# Patient Record
Sex: FEMALE | Race: WHITE | NOT HISPANIC OR LATINO | Employment: STUDENT | URBAN - METROPOLITAN AREA
[De-identification: names, ages, dates, MRNs, and addresses within clinical notes are randomized per-mention and may not be internally consistent; named-entity substitution may affect disease eponyms.]

---

## 2017-01-09 ENCOUNTER — OFFICE VISIT (OUTPATIENT)
Dept: PEDIATRIC ENDOCRINOLOGY | Facility: CLINIC | Age: 22
End: 2017-01-09
Payer: COMMERCIAL

## 2017-01-09 ENCOUNTER — LAB VISIT (OUTPATIENT)
Dept: LAB | Facility: HOSPITAL | Age: 22
End: 2017-01-09
Attending: NURSE PRACTITIONER
Payer: COMMERCIAL

## 2017-01-09 VITALS
SYSTOLIC BLOOD PRESSURE: 127 MMHG | DIASTOLIC BLOOD PRESSURE: 71 MMHG | WEIGHT: 141.75 LBS | BODY MASS INDEX: 26.09 KG/M2 | HEIGHT: 62 IN | HEART RATE: 95 BPM

## 2017-01-09 DIAGNOSIS — Z46.81 INSULIN PUMP TITRATION: ICD-10-CM

## 2017-01-09 DIAGNOSIS — E10.65 UNCONTROLLED TYPE 1 DIABETES MELLITUS WITH HYPERGLYCEMIA: Primary | ICD-10-CM

## 2017-01-09 DIAGNOSIS — E10.65 UNCONTROLLED TYPE 1 DIABETES MELLITUS WITH HYPERGLYCEMIA: ICD-10-CM

## 2017-01-09 LAB
ESTIMATED AVG GLUCOSE: 206 MG/DL
HBA1C MFR BLD HPLC: 8.8 %

## 2017-01-09 PROCEDURE — 83036 HEMOGLOBIN GLYCOSYLATED A1C: CPT

## 2017-01-09 PROCEDURE — 99999 PR PBB SHADOW E&M-EST. PATIENT-LVL III: CPT | Mod: PBBFAC,,, | Performed by: NURSE PRACTITIONER

## 2017-01-09 PROCEDURE — 2022F DILAT RTA XM EVC RTNOPTHY: CPT | Mod: S$GLB,,, | Performed by: NURSE PRACTITIONER

## 2017-01-09 PROCEDURE — 3045F PR MOST RECENT HEMOGLOBIN A1C LEVEL 7.0-9.0%: CPT | Mod: S$GLB,,, | Performed by: NURSE PRACTITIONER

## 2017-01-09 PROCEDURE — 36415 COLL VENOUS BLD VENIPUNCTURE: CPT | Mod: PO

## 2017-01-09 PROCEDURE — 1159F MED LIST DOCD IN RCRD: CPT | Mod: S$GLB,,, | Performed by: NURSE PRACTITIONER

## 2017-01-09 PROCEDURE — 3060F POS MICROALBUMINURIA REV: CPT | Mod: S$GLB,,, | Performed by: NURSE PRACTITIONER

## 2017-01-09 PROCEDURE — 99214 OFFICE O/P EST MOD 30 MIN: CPT | Mod: S$GLB,,, | Performed by: NURSE PRACTITIONER

## 2017-01-09 NOTE — MR AVS SNAPSHOT
[facility]  [address]  [facility] [address]  Phone: [phone]                  Tammie Gomez   2017 9:00 AM   Appointment    Description:  Female : 1995   Provider:  Jaja Birmingham NP   Department:  [facility]                To Do List           Future Appointments        Provider Department Dept Phone    2017 9:00 AM Jaja Birmingham NP [facility] [phone]      Goals (5 Years of Data)     None      [facility]     [facility] -  Assistance  Registered nurses in the [facility] Center provide clinical advisement, health education, appointment booking, and other advisory services.  Call for this free service at [phone].             Medications           Message regarding Medications     Verify the changes and/or additions to your medication regime listed below are the same as discussed with your clinician today.  If any of these changes or additions are incorrect, please notify your healthcare provider.             Verify that the below list of medications is an accurate representation of the medications you are currently taking.  If none reported, the list may be blank. If incorrect, please contact your healthcare provider. Carry this list with you in case of emergency.           Current Medications     FREESTYLE TEST Strp Use as directed to test blood glucose levels up to 8 times a day    FREESTYLE TEST Strp AS DIRECTED TO TEST BLOOD GLUCOSE LEVELS UP TO 8 TIMES PER DAY    glucagon (human recombinant) inj 1mg/mL kit Inject 1 mL (1 mg total) into the skin once. Kit Injection As directed    insulin aspart (NOVOLOG) 100 unit/mL injection Use as directed to fill pump with 200 units every 2 days    LUTERA, 28, 0.1-20 mg-mcg per tablet TK 1 T PO D UTD    norgestimate-ethinyl estradiol (SPRINTEC, 28,) 0.25-35 mg-mcg per tablet Take 1 tablet by mouth once daily.    NOVOLOG 100  unit/mL injection AS DIRECTED TO FILL PUMP WITH 200 UNITS EVERY 2 DAYS    urine glucose-ketones test (KETO-DIASTIX) Strp Check urine ketones when BG>300           Clinical Reference Information           Allergies as of 1/9/2017     Sulfa (Sulfonamide Antibiotics)      Immunizations Administered on Date of Encounter - 1/9/2017     None

## 2017-01-09 NOTE — PROGRESS NOTES
Tammie Gomez is being seen in the pediatric endocrinology clinic today in follow up for type 1 diabetes. She was accompanied by her father.    Interval History:   Tammie is a 21 y.o. female with type 1 diabetes diagnosed in December 1999.  She is on CSII using Omnipod.  Since the last visit in October 2016, she has been well with no major illnesses. Diabetes history: No episodes of DKA, no episodes of severe hypoglycemia resulting in seizure, no site infections.     Review of blood sugars from pump download, shows: overall average blood glucose of 224 mg/dL. She is checking her blood glucoses levels ~3-4 times a day. Injection/infusion sites: hips, arms.     Hypoglycemia: She is having minimal episodes of hypoglycemia. 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     Has been in the 200s throughout the day. No ketones. Few hypoglycemic episodes    Nutrition: carb counting and giving insulin prior to meals.     Review of growth chart: stable weight    Current insulin regimen:  Basal rates:  Mid         0.9 units/hr                               5 pm     1.00 units/hr    Carb Ratio: 12a-1:8g, 1p-1:7g                   Correction Factor: 1 unit for every 40 over 120/150 5pm-midnight    Total daily dose: ~57 units/day, 39% basal    Review of Systems:  History obtained from father and patient.  General ROS: no recent illness, no fatigue   Endocrine ROS: negative for - change in hair pattern, palpitations, polydypsia/polyuria, skin changes, temperature intolerance or nocturia.  Ophthalmic ROS: No blurry vision or double vision  Respiratory ROS: no cough, shortness of breath, or wheezing  Cardiovascular ROS: no chest pain or dyspnea on exertion  Gastrointestinal ROS: no abdominal pain, diarrhea, or constipation  Musculoskeletal ROS:no muscle or joint pain  Neurological ROS: No headaches  Dermatological ROS: no rashes, no dry skin  Gyn ROS: regular menses  The remainder of the  "review of systems was unremarkable.    Past Medical/Family/Surgical History:  I have reviewed, and verified the past medical, surgical, and family history and updated as appropriate.    Social History:  Denies SA, smoking, drug use. Reports occasional alcohol use  Sophomore in college    Meds:  Current Outpatient Prescriptions   Medication Sig    FREESTYLE TEST Strp Use as directed to test blood glucose levels up to 8 times a day    FREESTYLE TEST Strp AS DIRECTED TO TEST BLOOD GLUCOSE LEVELS UP TO 8 TIMES PER DAY    glucagon (human recombinant) inj 1mg/mL kit Inject 1 mL (1 mg total) into the skin once. Kit Injection As directed    insulin aspart (NOVOLOG) 100 unit/mL injection Use as directed to fill pump with 200 units every 2 days    LUTERA, 28, 0.1-20 mg-mcg per tablet TK 1 T PO D UTD    norgestimate-ethinyl estradiol (SPRINTEC, 28,) 0.25-35 mg-mcg per tablet Take 1 tablet by mouth once daily.    NOVOLOG 100 unit/mL injection AS DIRECTED TO FILL PUMP WITH 200 UNITS EVERY 2 DAYS    urine glucose-ketones test (KETO-DIASTIX) Strp Check urine ketones when BG>300     No current facility-administered medications for this visit.        Physical Exam:  Visit Vitals    /71 (BP Location: Left arm, Patient Position: Sitting, BP Method: Automatic)    Pulse 95    Ht 5' 2.05" (1.576 m)    Wt 64.3 kg (141 lb 12.1 oz)    BMI 25.89 kg/m2      General: well appearing  Skin: normal tone and texture  Eyes: pupils equal and reactive to light, extraocular movements intact  Neck: No lymphadenopathy, no thyromegaly  Lungs: Clear to ausculatation  Heart: regular rate and rhythm, normal S1/S2, no murmurs  Abdomen: soft, non-distended, no masses  Neuro: gross motor exam normal by observation, DTR normal for age  Extremities: full range of motion, normal tone, + evidence of BG monitoring on fingers   Injection Sites: normal    Labs:  Hemoglobin A1C   Date Value Ref Range Status   10/10/2016 8.4 (H) 4.5 - 6.2 % Final     " Comment:     According to ADA guidelines, hemoglobin A1C <7.0% represents  optimal control in non-pregnant diabetic patients.  Different  metrics may apply to specific populations.   Standards of Medical Care in Diabetes - 2016.  For the purpose of screening for the presence of diabetes:  <5.7%     Consistent with the absence of diabetes  5.7-6.4%  Consistent with increasing risk for diabetes   (prediabetes)  >or=6.5%  Consistent with diabetes  Currently no consensus exists for use of hemoglobin A1C  for diagnosis of diabetes for children.     07/18/2016 7.5 (H) 4.5 - 6.2 % Final     Comment:     According to ADA guidelines, hemoglobin A1C <7.0% represents  optimal control in non-pregnant diabetic patients.  Different  metrics may apply to specific populations.   Standards of Medical Care in Diabetes - 2016.  For the purpose of screening for the presence of diabetes:  <5.7%     Consistent with the absence of diabetes  5.7-6.4%  Consistent with increasing risk for diabetes   (prediabetes)  >or=6.5%  Consistent with diabetes  Currently no consensus exists for use of hemoglobin A1C  for diagnosis of diabetes for children.     03/28/2016 8.5 (H) 4.5 - 6.2 % Final       Screening tests:  Component      Latest Ref Rng 7/18/2016   Cholesterol      120 - 199 mg/dL 182   Triglycerides      30 - 150 mg/dL 185 (H)   HDL      40 - 75 mg/dL 50   LDL Cholesterol      63.0 - 159.0 mg/dL 95.0   HDL/Chol Ratio      20.0 - 50.0 % 27.5   Total Cholesterol/HDL Ratio      2.0 - 5.0 3.6   Non-HDL Cholesterol      mg/dL 132   Microalbum.,U,Random      ug/mL 11.0   Creatinine, Random Ur      15.0 - 325.0 mg/dL 293.0   Microalb Creat Ratio      0.0 - 30.0 ug/mg 3.8   TSH      0.400 - 4.000 uIU/mL 1.609     TTG IgA      <20 UNITS 5   IgA       mg/dL 200       Eye Exam: July 2016- no diabetic changes    Assessment/Plan:  Tammie HARKINS is a 21 y.o. female with T1D of ~17y duration on 0.50 units/kg/day. Diabetes under sub-optimal control, and  control worsening. A1c is rising. Goal A1c is <7.5%. Her blood sugars were reviewed for the past four weeks. I reviewed boluses and basal rates and adjusted insulin doses: increased basal rate at midnight to 0.95 units/hr and 5p-1.1units/hr   -will switch to Humalog when dad calls for refills.   -instructed to upload pump or call if has hypoglycemia once insulin is changed.   -Reviewed sick day management and the importance of checking for ketones  -Reviewed pump management if ketones are present  -Continued conversation on transitioning to adult care- Tammie will continue with Ped Endo for now but likely transfer by summer  Screening labs UTD      Follow up in 3 months.    It was a pleasure to see your patient in clinic today. Please call with any questions or concerns.      Jaja Birmingham NP  Pediatric Endocrinology      Over 50% of this 40 minute visit was spent in counseling/coordinating care. I counseled the family on hypoglycemia prevention and treatment, sick day management, causes, recognition and consequences of DKA, sports and diabetes management, impact of drug and alcohol use on blood glucose control, intensive insulin therapy, insulin omission, and goals for therapy.

## 2017-03-16 ENCOUNTER — PATIENT MESSAGE (OUTPATIENT)
Dept: PEDIATRIC ENDOCRINOLOGY | Facility: CLINIC | Age: 22
End: 2017-03-16

## 2017-03-20 RX ORDER — INSULIN LISPRO 100 [IU]/ML
INJECTION, SOLUTION INTRAVENOUS; SUBCUTANEOUS
Qty: 60 ML | Refills: 1 | Status: SHIPPED | OUTPATIENT
Start: 2017-03-20 | End: 2017-05-15 | Stop reason: SDUPTHER

## 2017-05-15 ENCOUNTER — OFFICE VISIT (OUTPATIENT)
Dept: PEDIATRIC ENDOCRINOLOGY | Facility: CLINIC | Age: 22
End: 2017-05-15
Payer: COMMERCIAL

## 2017-05-15 ENCOUNTER — LAB VISIT (OUTPATIENT)
Dept: LAB | Facility: HOSPITAL | Age: 22
End: 2017-05-15
Attending: NURSE PRACTITIONER
Payer: COMMERCIAL

## 2017-05-15 VITALS
WEIGHT: 146.81 LBS | DIASTOLIC BLOOD PRESSURE: 84 MMHG | HEIGHT: 62 IN | BODY MASS INDEX: 27.02 KG/M2 | SYSTOLIC BLOOD PRESSURE: 137 MMHG | HEART RATE: 104 BPM

## 2017-05-15 DIAGNOSIS — Z46.81 INSULIN PUMP TITRATION: ICD-10-CM

## 2017-05-15 DIAGNOSIS — E10.65 UNCONTROLLED TYPE 1 DIABETES MELLITUS WITH HYPERGLYCEMIA: Primary | ICD-10-CM

## 2017-05-15 DIAGNOSIS — E10.65 UNCONTROLLED TYPE 1 DIABETES MELLITUS WITH HYPERGLYCEMIA: ICD-10-CM

## 2017-05-15 PROCEDURE — 99999 PR PBB SHADOW E&M-EST. PATIENT-LVL III: CPT | Mod: PBBFAC,,, | Performed by: NURSE PRACTITIONER

## 2017-05-15 PROCEDURE — 1160F RVW MEDS BY RX/DR IN RCRD: CPT | Mod: S$GLB,,, | Performed by: NURSE PRACTITIONER

## 2017-05-15 PROCEDURE — 83036 HEMOGLOBIN GLYCOSYLATED A1C: CPT

## 2017-05-15 PROCEDURE — 3060F POS MICROALBUMINURIA REV: CPT | Mod: 8P,S$GLB,, | Performed by: NURSE PRACTITIONER

## 2017-05-15 PROCEDURE — 36415 COLL VENOUS BLD VENIPUNCTURE: CPT | Mod: PO

## 2017-05-15 PROCEDURE — 99214 OFFICE O/P EST MOD 30 MIN: CPT | Mod: S$GLB,,, | Performed by: NURSE PRACTITIONER

## 2017-05-15 PROCEDURE — 3045F PR MOST RECENT HEMOGLOBIN A1C LEVEL 7.0-9.0%: CPT | Mod: S$GLB,,, | Performed by: NURSE PRACTITIONER

## 2017-05-15 RX ORDER — INSULIN LISPRO 100 [IU]/ML
INJECTION, SOLUTION INTRAVENOUS; SUBCUTANEOUS
Qty: 60 ML | Refills: 1 | Status: SHIPPED | OUTPATIENT
Start: 2017-05-15 | End: 2017-05-15 | Stop reason: SDUPTHER

## 2017-05-15 RX ORDER — BLOOD SUGAR DIAGNOSTIC
STRIP MISCELLANEOUS
Qty: 250 EACH | Refills: 6 | Status: SHIPPED | OUTPATIENT
Start: 2017-05-15 | End: 2017-11-15 | Stop reason: SDUPTHER

## 2017-05-15 RX ORDER — INSULIN LISPRO 100 [IU]/ML
INJECTION, SOLUTION INTRAVENOUS; SUBCUTANEOUS
Qty: 90 ML | Refills: 1 | Status: SHIPPED | OUTPATIENT
Start: 2017-05-15 | End: 2017-11-15 | Stop reason: SDUPTHER

## 2017-05-15 NOTE — PATIENT INSTRUCTIONS
Current Insulin Regimen    Basal rates:  Mid        1.05 units/hr                               5 pm     1.2 units/hr    Carb Ratio: 12a-1:8g, 1p-1:7g                   Correction Factor: 1 unit for every 40 over 120/150 5pm-midnight        Next Appointment: Follow up in 3 months with adult endocrine      In case of emergency (for example, patient is vomiting or ketones positive), please call [phone] and ask for pediatric endocrinology on call.    For prescription refills, please call during business hours.

## 2017-05-15 NOTE — PROGRESS NOTES
Tammie Gomez is being seen in the pediatric endocrinology clinic today in follow up for type 1 diabetes. She was accompanied by her father.    Interval History:   Tammie is a 22 y.o. female with type 1 diabetes diagnosed in December 1999.  She is on CSII using Omnipod.  Since the last visit in October 2016, she has been well with no major illnesses. Diabetes history: No episodes of DKA, no episodes of severe hypoglycemia resulting in seizure, no site infections. She switched to Humalog a couple of months ago and ahs noticed higher blood sugars and difficulty getting them down.     Review of blood sugars from pump download, shows: overall average blood glucose of 322 mg/dL (). She is checking her blood glucoses levels ~3-4 times a day. Injection/infusion sites: hips, arms.     Hypoglycemia: She is having minimal episodes of hypoglycemia. 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     Has been in the 200s throughout the day. No ketones.     Nutrition: carb counting and giving insulin prior to meals.     Review of growth chart: stable weight    Current insulin regimen:  Basal rates:  Mid         0.95 units/hr                               5 pm     1.1 units/hr    Carb Ratio: 12a-1:8g, 1p-1:7g                   Correction Factor: 1 unit for every 40 over 120/150 5pm-midnight    Total daily dose: ~57 units/day, 39% basal    Review of Systems:  History obtained from father and patient.  General ROS: no recent illness, no fatigue   Endocrine ROS: negative for - change in hair pattern, palpitations, polydypsia/polyuria, skin changes, temperature intolerance or nocturia.  Ophthalmic ROS: No blurry vision or double vision  Respiratory ROS: no cough, shortness of breath, or wheezing  Cardiovascular ROS: no chest pain or dyspnea on exertion  Gastrointestinal ROS: no abdominal pain, diarrhea, or constipation  Musculoskeletal ROS:no muscle or joint pain  Neurological ROS: No  "headaches  Dermatological ROS: no rashes, no dry skin  Gyn ROS: regular menses  The remainder of the review of systems was unremarkable.    Past Medical/Family/Surgical History:  I have reviewed, and verified the past medical, surgical, and family history and updated as appropriate.    Social History:  Denies SA, smoking, drug use. Reports occasional alcohol use  Sophomore in college    Meds:  Current Outpatient Prescriptions   Medication Sig    FREESTYLE TEST Strp Use as directed to test blood glucose levels up to 8 times a day    FREESTYLE TEST Strp AS DIRECTED TO TEST BLOOD GLUCOSE LEVELS UP TO 8 TIMES PER DAY    glucagon (human recombinant) inj 1mg/mL kit Inject 1 mL (1 mg total) into the skin once. Kit Injection As directed    insulin lispro (HUMALOG) 100 unit/mL injection Place 200units in pump every 2-3 days-90day supply    LUTERA, 28, 0.1-20 mg-mcg per tablet TK 1 T PO D UTD    norgestimate-ethinyl estradiol (SPRINTEC, 28,) 0.25-35 mg-mcg per tablet Take 1 tablet by mouth once daily.    urine glucose-ketones test (KETO-DIASTIX) Strp Check urine ketones when BG>300     No current facility-administered medications for this visit.        Physical Exam:  /84 (BP Location: Right arm, Patient Position: Sitting, BP Method: Automatic)  Pulse 104  Ht 5' 2.32" (1.583 m)  Wt 66.6 kg (146 lb 13.2 oz)  BMI 26.58 kg/m2   General: well appearing  Skin: normal tone and texture  Eyes: pupils equal and reactive to light, extraocular movements intact  Neck: No lymphadenopathy, no thyromegaly  Lungs: Clear to ausculatation  Heart: regular rate and rhythm, normal S1/S2, no murmurs  Abdomen: soft, non-distended, no masses  Neuro: gross motor exam normal by observation, DTR normal for age  Extremities: full range of motion, normal tone, + evidence of BG monitoring on fingers   Injection Sites: normal    Labs:  Hemoglobin A1C   Date Value Ref Range Status   01/09/2017 8.8 (H) 4.5 - 6.2 % Final     Comment:     " According to ADA guidelines, hemoglobin A1C <7.0% represents  optimal control in non-pregnant diabetic patients.  Different  metrics may apply to specific populations.   Standards of Medical Care in Diabetes - 2016.  For the purpose of screening for the presence of diabetes:  <5.7%     Consistent with the absence of diabetes  5.7-6.4%  Consistent with increasing risk for diabetes   (prediabetes)  >or=6.5%  Consistent with diabetes  Currently no consensus exists for use of hemoglobin A1C  for diagnosis of diabetes for children.     10/10/2016 8.4 (H) 4.5 - 6.2 % Final     Comment:     According to ADA guidelines, hemoglobin A1C <7.0% represents  optimal control in non-pregnant diabetic patients.  Different  metrics may apply to specific populations.   Standards of Medical Care in Diabetes - 2016.  For the purpose of screening for the presence of diabetes:  <5.7%     Consistent with the absence of diabetes  5.7-6.4%  Consistent with increasing risk for diabetes   (prediabetes)  >or=6.5%  Consistent with diabetes  Currently no consensus exists for use of hemoglobin A1C  for diagnosis of diabetes for children.     07/18/2016 7.5 (H) 4.5 - 6.2 % Final     Comment:     According to ADA guidelines, hemoglobin A1C <7.0% represents  optimal control in non-pregnant diabetic patients.  Different  metrics may apply to specific populations.   Standards of Medical Care in Diabetes - 2016.  For the purpose of screening for the presence of diabetes:  <5.7%     Consistent with the absence of diabetes  5.7-6.4%  Consistent with increasing risk for diabetes   (prediabetes)  >or=6.5%  Consistent with diabetes  Currently no consensus exists for use of hemoglobin A1C  for diagnosis of diabetes for children.         Screening tests:  Component      Latest Ref Rng 7/18/2016   Cholesterol      120 - 199 mg/dL 182   Triglycerides      30 - 150 mg/dL 185 (H)   HDL      40 - 75 mg/dL 50   LDL Cholesterol      63.0 - 159.0 mg/dL 95.0   HDL/Chol  Ratio      20.0 - 50.0 % 27.5   Total Cholesterol/HDL Ratio      2.0 - 5.0 3.6   Non-HDL Cholesterol      mg/dL 132   Microalbum.,U,Random      ug/mL 11.0   Creatinine, Random Ur      15.0 - 325.0 mg/dL 293.0   Microalb Creat Ratio      0.0 - 30.0 ug/mg 3.8   TSH      0.400 - 4.000 uIU/mL 1.609     TTG IgA      <20 UNITS 5   IgA       mg/dL 200       Eye Exam: July 2016- no diabetic changes    Assessment/Plan:  Tammie is a 22 y.o. female with T1D of ~17y duration on 0.50 units/kg/day. Diabetes under sub-optimal control, and control worsening. A1c is rising. Goal A1c is <7.5%.   Lab Results   Component Value Date    HGBA1C 8.7 (H) 05/15/2017       Her blood sugars were reviewed for the past four weeks. I reviewed boluses and basal rates and adjusted insulin doses: increased basal rate at midnight to 1.05 units/hr and 5p-1.2units/hr  -BG have increased since switching to Humalog. We will see how she does with insulin changes and if it is not working will switch back to novolog and get a PA.   -instructed to upload pump or call if has hypoglycemia once insulin is changed.   -Reviewed pump management if ketones are present  -Continued conversation on transitioning to adult care- Tammie will continue with Ped Endo for now but likely transfer by summer    Screening labs UTD-due for A1C      Follow up in 3 months with adult endo.    It was a pleasure to see your patient in clinic today. Please call with any questions or concerns.      Jaja Birmingham, LUIS CARLOS  Pediatric Endocrinology      Over 50% of this 40 minute visit was spent in counseling/coordinating care. I counseled the family on hypoglycemia prevention and treatment, sick day management, causes, recognition and consequences of DKA, sports and diabetes management, impact of drug and alcohol use on blood glucose control, intensive insulin therapy, insulin omission, and goals for therapy.

## 2017-05-15 NOTE — MR AVS SNAPSHOT
[facility]  [address]  [facility] [address]  Phone: [phone]                  Tammie Gomez   5/15/2017 11:00 AM   Office Visit    Description:  Female : 1995   Provider:  Jaja Birmingham NP   Department:  [facility] End[facility]           Reason for Visit     Diabetes           Diagnoses this Visit        Comments    Uncontrolled type 1 diabetes mellitus with hyperglycemia    -  Primary     Insulin pump titration                To Do List           Goals (5 Years of Data)     None       These Medications        Disp Refills Start End    insulin pump cartridge (OMNIPOD INSULIN REFILL) Crtg 45 each 1 5/15/2017     Place 200units every 2days    Pharmacy: [facility] [place] [address] [address] AT [facility] [address] Ph #: [phone]       FREESTYLE TEST Strp 250 each 6 5/15/2017     Use as directed to test blood glucose levels up to 8 times a day    Pharmacy: [facility] [place] [address] [address] AT [facility] [address] Ph #: [phone]       insulin lispro (HUMALOG) 100 unit/mL injection 90 mL 1 5/15/2017 5/15/2018    Place 200units in pump every 2 days-90day supply    Pharmacy: [facility] [address] [place] [address] [address] AT [facility] [address] Ph #: [phone]         [facility] On [facility]     [facility] On Call Nurse Care Line -  Assistance  Unless otherwise directed by your provider, please contact [facility] On-Call, our nurse care line that is available for  assistance.     Registered nurses in the [facility] provide: appointment scheduling, clinical advisement, health education, and other advisory services.  Call: [phone] (toll free)               Medications           Message regarding Medications     Verify the changes and/or additions to your medication regime listed below are the same as discussed with  your clinician today.  If any of these changes or additions are incorrect, please notify your healthcare provider.        START taking these NEW medications        Refills    insulin pump cartridge (OMNIPOD INSULIN REFILL) Crtg 1    Sig: Place 200units every 2days    Class: Print      CHANGE how you are taking these medications     Start Taking Instead of    insulin lispro (HUMALOG) 100 unit/mL injection insulin lispro (HUMALOG) 100 unit/mL injection    Dosage:  Place 200units in pump every 2 days-90day supply Dosage:  Place 200units in pump every 2-3 days-90day supply    Reason for Change:  Reorder       STOP taking these medications     insulin aspart (NOVOLOG) 100 unit/mL injection Use as directed to fill pump with 200 units every 2 days           Verify that the below list of medications is an accurate representation of the medications you are currently taking.  If none reported, the list may be blank. If incorrect, please contact your healthcare provider. Carry this list with you in case of emergency.           Current Medications     FREESTYLE TEST Strp AS DIRECTED TO TEST BLOOD GLUCOSE LEVELS UP TO 8 TIMES PER DAY    FREESTYLE TEST Strp Use as directed to test blood glucose levels up to 8 times a day    glucagon (human recombinant) inj 1mg/mL kit Inject 1 mL (1 mg total) into the skin once. Kit Injection As directed    insulin lispro (HUMALOG) 100 unit/mL injection Place 200units in pump every 2 days-90day supply    insulin pump cartridge (OMNIPOD INSULIN REFILL) Crtg Place 200units every 2days    LUTERA, 28, 0.1-20 mg-mcg per tablet TK 1 T PO D UTD    norgestimate-ethinyl estradiol (SPRINTEC, 28,) 0.25-35 mg-mcg per tablet Take 1 tablet by mouth once daily.    urine glucose-ketones test (KETO-DIASTIX) Strp Check urine ketones when BG>300           Clinical Reference Information           Your Vitals Were     BP Pulse Height Weight BMI    137/84 (BP Location: Right arm, Patient Position: Sitting, BP Method:  "Automatic) 104 5' 2.32" (1.583 m) 66.6 kg (146 lb 13.2 oz) 26.58 kg/m2      Blood Pressure          Most Recent Value    BP  137/84      Allergies as of 5/15/2017     Sulfa (Sulfonamide Antibiotics)      Immunizations Administered on Date of Encounter - 5/15/2017     None      Orders Placed During Today's Visit     Future Labs/Procedures Expected by Expires    Hemoglobin A1c  5/15/2017 5/15/2018      Instructions    Current Insulin Regimen    Basal rates:  Mid        1.05 units/hr                               5 pm     1.2 units/hr    Carb Ratio: 12a-1:8g, 1p-1:7g                   Correction Factor: 1 unit for every 40 over 120/150 5pm-midnight        Next Appointment: Follow up in 3 months with adult endocrine      In case of emergency (for example, patient is vomiting or ketones positive), please call [phone] and ask for pediatric endocrinology on call.    For prescription refills, please call during business hours.        Language Assistance Services     ATTENTION: Language assistance services are available, free of charge. Please call [phone].      ATENCIÓN: Si habla español, tiene a mccormack disposición servicios gratuitos de asistencia lingüística. Llame al [phone].     FLORENCE Ý: N?u b?n nói Ti?ng Vi?t, có các d?ch v? h? tr? ngôn ng? mi?n phí dành cho b?n. G?i s? [phone].         [facility] complies with applicable Federal civil rights laws and does not discriminate on the basis of race, color, national origin, age, disability, or sex.        "

## 2017-05-16 LAB
ESTIMATED AVG GLUCOSE: 203 MG/DL
HBA1C MFR BLD HPLC: 8.7 %

## 2017-05-26 ENCOUNTER — PATIENT MESSAGE (OUTPATIENT)
Dept: PEDIATRIC ENDOCRINOLOGY | Facility: CLINIC | Age: 22
End: 2017-05-26

## 2017-08-04 ENCOUNTER — OFFICE VISIT (OUTPATIENT)
Dept: OPTOMETRY | Facility: CLINIC | Age: 22
End: 2017-08-04
Payer: COMMERCIAL

## 2017-08-04 DIAGNOSIS — E10.9 TYPE 1 DIABETES MELLITUS WITHOUT RETINOPATHY: Primary | ICD-10-CM

## 2017-08-04 PROCEDURE — 92014 COMPRE OPH EXAM EST PT 1/>: CPT | Mod: S$GLB,,, | Performed by: OPTOMETRIST

## 2017-08-04 PROCEDURE — 99999 PR PBB SHADOW E&M-EST. PATIENT-LVL II: CPT | Mod: PBBFAC,,, | Performed by: OPTOMETRIST

## 2017-10-20 ENCOUNTER — OFFICE VISIT (OUTPATIENT)
Dept: ENDOCRINOLOGY | Facility: CLINIC | Age: 22
End: 2017-10-20
Payer: COMMERCIAL

## 2017-10-20 VITALS
DIASTOLIC BLOOD PRESSURE: 64 MMHG | SYSTOLIC BLOOD PRESSURE: 110 MMHG | BODY MASS INDEX: 27.6 KG/M2 | HEIGHT: 62 IN | WEIGHT: 150 LBS | HEART RATE: 72 BPM

## 2017-10-20 DIAGNOSIS — E10.65 UNCONTROLLED TYPE 1 DIABETES MELLITUS WITH HYPERGLYCEMIA: Primary | ICD-10-CM

## 2017-10-20 DIAGNOSIS — E10.649 HYPOGLYCEMIA DUE TO TYPE 1 DIABETES MELLITUS: ICD-10-CM

## 2017-10-20 DIAGNOSIS — Z71.9 HEALTH COUNSELING: ICD-10-CM

## 2017-10-20 DIAGNOSIS — Z96.41 INSULIN PUMP STATUS: ICD-10-CM

## 2017-10-20 PROCEDURE — 99999 PR PBB SHADOW E&M-EST. PATIENT-LVL III: CPT | Mod: PBBFAC,,, | Performed by: NURSE PRACTITIONER

## 2017-10-20 PROCEDURE — 99215 OFFICE O/P EST HI 40 MIN: CPT | Mod: S$GLB,,, | Performed by: NURSE PRACTITIONER

## 2017-10-20 NOTE — PROGRESS NOTES
CC: Ms. Tammie Gomez arrives today for management of Type 1  DM and review of chronic medical conditions, as listed in the visit diagnosis section of this encounter. She arrives with her father today.     HPI: Ms. Tammie Gomez was diagnosed with Type 1  DM in 12/99. Never admitted for DKA. Previously on MDI. Converted to Omnipod in 8th grade.     Last seen in peds endocrine by SHWETA [place] NP but she is new to me today.  Has glucagon kit at home but has not been used, family is knowledgeable    CURRENT DIABETIC MEDS: insulin pump   Humalog in Omnipod  MN 1.05 u/hr  0500 1.2 u/hr  ICR  B 1:8          L 1:7          D 1:7  ISF 1:40  BG target 120  3 hours active insulin  Glucometer type: PDM  BG readings are checked 5-6 x/day; she has noticed BG elevate with menstrual cycle monthly    Omnipod downloaded for past 2 weeks - see attached reports  BG average 156 of 67 readings, BG range   TDD 58  Changes pod q3 days  Hypoglycemia: Yes, usually r/t exercise, during the day but occ during the night; feels s/s when BG < 65  Hypoglycemic Symptoms: palpitations, blurry vision, shaky  Hypoglycemia Treatment: apple juice    Missing Insulin/PO medication doses: No  Timing prandial insulin 5-15 minutes before meals: yes    Exercise: Yes; plays tennis     Dietary Habits: guessing at carb counting; eating 3 meals, drinks water and unsweet tea; + snacks on chips/ fruit - will cover with insulin if needed; eats half out half at home    Last DM education appointment: 8/2015 SHWETA Angel RD    REVIEW OF SYSTEMS  Constitutional: no c/o fatigue, weakness, + weight gain  Eyes: denies visual disturbances.  Cardiac: no palpitations or chest pain.  Respiratory: no SOB, HANNA, or cough  GI: no N/V/D, abdominal pain   Skin: no lesions or rashes.  Neuro: no numbness, tingling, or parasthesias.  Endocrine: denies polyphagia, polydipsia, polyuria    Personally reviewed Past Medical, Surgical, Social History.    Vital Signs  /64    "Pulse 72   Ht 5' 2" (1.575 m)   Wt 68 kg (150 lb)   LMP 10/06/2017   BMI 27.44 kg/m²     Personally reviewed the below labs:    Hemoglobin A1C   Date Value Ref Range Status   05/15/2017 8.7 (H) 4.5 - 6.2 % Final     Comment:     According to ADA guidelines, hemoglobin A1C <7.0% represents  optimal control in non-pregnant diabetic patients.  Different  metrics may apply to specific populations.   Standards of Medical Care in Diabetes - 2016.  For the purpose of screening for the presence of diabetes:  <5.7%     Consistent with the absence of diabetes  5.7-6.4%  Consistent with increasing risk for diabetes   (prediabetes)  >or=6.5%  Consistent with diabetes  Currently no consensus exists for use of hemoglobin A1C  for diagnosis of diabetes for children.     01/09/2017 8.8 (H) 4.5 - 6.2 % Final     Comment:     According to ADA guidelines, hemoglobin A1C <7.0% represents  optimal control in non-pregnant diabetic patients.  Different  metrics may apply to specific populations.   Standards of Medical Care in Diabetes - 2016.  For the purpose of screening for the presence of diabetes:  <5.7%     Consistent with the absence of diabetes  5.7-6.4%  Consistent with increasing risk for diabetes   (prediabetes)  >or=6.5%  Consistent with diabetes  Currently no consensus exists for use of hemoglobin A1C  for diagnosis of diabetes for children.     10/10/2016 8.4 (H) 4.5 - 6.2 % Final     Comment:     According to ADA guidelines, hemoglobin A1C <7.0% represents  optimal control in non-pregnant diabetic patients.  Different  metrics may apply to specific populations.   Standards of Medical Care in Diabetes - 2016.  For the purpose of screening for the presence of diabetes:  <5.7%     Consistent with the absence of diabetes  5.7-6.4%  Consistent with increasing risk for diabetes   (prediabetes)  >or=6.5%  Consistent with diabetes  Currently no consensus exists for use of hemoglobin A1C  for diagnosis of diabetes for children.   "       Chemistry        Component Value Date/Time     07/18/2016 0945    K 4.8 07/18/2016 0945     07/18/2016 0945    CO2 23 07/18/2016 0945    BUN 13 07/18/2016 0945    CREATININE 0.9 07/18/2016 0945     (H) 07/18/2016 0945        Component Value Date/Time    CALCIUM 9.6 07/18/2016 0945    ALKPHOS 56 07/18/2016 0945    AST 15 07/18/2016 0945    ALT 11 07/18/2016 0945    BILITOT 0.4 07/18/2016 0945    ESTGFRAFRICA >60.0 07/18/2016 0945    EGFRNONAA >60.0 07/18/2016 0945          Lab Results   Component Value Date    CHOL 182 07/18/2016    CHOL 196 08/10/2015    CHOL 173 07/30/2014     Lab Results   Component Value Date    HDL 50 07/18/2016    HDL 64 08/10/2015     Lab Results   Component Value Date    LDLCALC 95.0 07/18/2016    LDLCALC 117.4 08/10/2015     Lab Results   Component Value Date    TRIG 185 (H) 07/18/2016    TRIG 73 08/10/2015     Lab Results   Component Value Date    CHOLHDL 27.5 07/18/2016    CHOLHDL 32.7 08/10/2015       Lab Results   Component Value Date    MICALBCREAT 3.8 07/18/2016     Lab Results   Component Value Date    TSH 1.609 07/18/2016     PHYSICAL EXAMINATION  Constitutional: Appears well, no distress  Neck: Supple, trachea midline  Respiratory: CTA, even and unlabored.  Cardiovascular: RRR, no murmurs, no carotid bruits. no edema.    Abdomen: soft, non tender, non distended, active BS x 4  Skin: warm and dry; no lipohypertrophy, or acanthosis nigracans observed.      Assessment/Plan  1. Uncontrolled type 1 diabetes mellitus with hyperglycemia  Hemoglobin A1c    Microalbumin/creatinine urine ratio  Discussed diagnosis of DM, acute and long term complications and tx options including personal CGM. She is not interested at this time. Reviewed A1c and BG goals for adult DM. Consider lower BG target on pump.  - reviewed accurate CHO counting for better prandial coverage.   - discussed exercise induced hypoglycemia. Use lower temp basal / suspend during exercise. Reviewed  hypoglycemia, s/s and appropriate tx options.  - consider different basal pattern for menses  - reviewed extend bolus feature if eating high fat/ high CHO meals.   - printed Rx for Lantus provided today, discussed back-up plan in case of pump failure  - continue OCP given A1c >8% at present.   -  Instructed to monitor BG ac/hs/some pp and 3am.      2. Hypoglycemia due to type 1 diabetes mellitus  See above   3. Insulin pump status     4. Health counseling         FOLLOW UP  Return in about 3 months (around 1/20/2018).   Labs prior to appt at [facility] lab      Time spent: 60 minutes >50% in counseling as above and pump download

## 2017-11-15 DIAGNOSIS — E10.65 UNCONTROLLED TYPE 1 DIABETES MELLITUS WITH HYPERGLYCEMIA: ICD-10-CM

## 2017-11-15 RX ORDER — INSULIN LISPRO 100 [IU]/ML
INJECTION, SOLUTION INTRAVENOUS; SUBCUTANEOUS
Qty: 90 ML | Refills: 2 | Status: SHIPPED | OUTPATIENT
Start: 2017-11-15 | End: 2018-04-30 | Stop reason: SDUPTHER

## 2017-11-15 RX ORDER — BLOOD SUGAR DIAGNOSTIC
STRIP MISCELLANEOUS
Qty: 250 EACH | Refills: 11 | Status: SHIPPED | OUTPATIENT
Start: 2017-11-15 | End: 2018-04-30 | Stop reason: SDUPTHER

## 2017-12-11 ENCOUNTER — PATIENT MESSAGE (OUTPATIENT)
Dept: ENDOCRINOLOGY | Facility: CLINIC | Age: 22
End: 2017-12-11

## 2017-12-12 ENCOUNTER — PATIENT OUTREACH (OUTPATIENT)
Dept: DIABETES | Facility: CLINIC | Age: 22
End: 2017-12-12

## 2017-12-12 ENCOUNTER — TELEPHONE (OUTPATIENT)
Dept: ENDOCRINOLOGY | Facility: CLINIC | Age: 22
End: 2017-12-12

## 2017-12-12 NOTE — TELEPHONE ENCOUNTER
----- Message from Patricia Yañez sent at 12/12/2017 11:59 AM CST -----  Contact: Martha [phone]  Martha calling in regards to patient most recent chart notes they sent over a fax last week on 12/08/2017 please call back to discuss.

## 2017-12-14 ENCOUNTER — TELEPHONE (OUTPATIENT)
Dept: ENDOCRINOLOGY | Facility: CLINIC | Age: 22
End: 2017-12-14

## 2017-12-14 NOTE — TELEPHONE ENCOUNTER
----- Message from Chandni Ceballos sent at 12/14/2017  8:58 AM CST -----  Contact: Diabetes [facility] [phone]  Diabetes has not received recent chart notes for the PT - pls refax to [phone] (alternate fax #)

## 2017-12-15 ENCOUNTER — PATIENT MESSAGE (OUTPATIENT)
Dept: ENDOCRINOLOGY | Facility: CLINIC | Age: 22
End: 2017-12-15

## 2018-01-15 ENCOUNTER — LAB VISIT (OUTPATIENT)
Dept: LAB | Facility: HOSPITAL | Age: 23
End: 2018-01-15
Attending: PEDIATRICS
Payer: COMMERCIAL

## 2018-01-15 DIAGNOSIS — E10.65 UNCONTROLLED TYPE 1 DIABETES MELLITUS WITH HYPERGLYCEMIA: ICD-10-CM

## 2018-01-15 LAB
ESTIMATED AVG GLUCOSE: 148 MG/DL
HBA1C MFR BLD HPLC: 6.8 %

## 2018-01-15 PROCEDURE — 83036 HEMOGLOBIN GLYCOSYLATED A1C: CPT

## 2018-01-15 PROCEDURE — 36415 COLL VENOUS BLD VENIPUNCTURE: CPT | Mod: PO

## 2018-01-16 ENCOUNTER — PATIENT MESSAGE (OUTPATIENT)
Dept: ENDOCRINOLOGY | Facility: CLINIC | Age: 23
End: 2018-01-16

## 2018-01-23 ENCOUNTER — OFFICE VISIT (OUTPATIENT)
Dept: ENDOCRINOLOGY | Facility: CLINIC | Age: 23
End: 2018-01-23
Payer: COMMERCIAL

## 2018-01-23 VITALS
DIASTOLIC BLOOD PRESSURE: 70 MMHG | SYSTOLIC BLOOD PRESSURE: 112 MMHG | HEART RATE: 81 BPM | BODY MASS INDEX: 25.92 KG/M2 | HEIGHT: 62 IN | WEIGHT: 140.88 LBS

## 2018-01-23 DIAGNOSIS — Z96.41 INSULIN PUMP STATUS: ICD-10-CM

## 2018-01-23 DIAGNOSIS — E10.649 HYPOGLYCEMIA DUE TO TYPE 1 DIABETES MELLITUS: ICD-10-CM

## 2018-01-23 DIAGNOSIS — E10.65 UNCONTROLLED TYPE 1 DIABETES MELLITUS WITH HYPERGLYCEMIA: Primary | ICD-10-CM

## 2018-01-23 PROCEDURE — 99999 PR PBB SHADOW E&M-EST. PATIENT-LVL III: CPT | Mod: PBBFAC,,, | Performed by: NURSE PRACTITIONER

## 2018-01-23 PROCEDURE — 99215 OFFICE O/P EST HI 40 MIN: CPT | Mod: S$GLB,,, | Performed by: NURSE PRACTITIONER

## 2018-01-23 NOTE — Clinical Note
Please put recall in but she will try to go online next month to schedule appt online.  RTC n 3-4 months, labs prior to appt at [facility] lab,  Friday pump clinic Thanks, Cecelia MEJIA

## 2018-01-24 ENCOUNTER — PATIENT MESSAGE (OUTPATIENT)
Dept: ENDOCRINOLOGY | Facility: CLINIC | Age: 23
End: 2018-01-24

## 2018-03-02 ENCOUNTER — TELEPHONE (OUTPATIENT)
Dept: ENDOCRINOLOGY | Facility: CLINIC | Age: 23
End: 2018-03-02

## 2018-04-23 ENCOUNTER — LAB VISIT (OUTPATIENT)
Dept: LAB | Facility: HOSPITAL | Age: 23
End: 2018-04-23
Attending: PEDIATRICS
Payer: COMMERCIAL

## 2018-04-23 DIAGNOSIS — E10.65 UNCONTROLLED TYPE 1 DIABETES MELLITUS WITH HYPERGLYCEMIA: ICD-10-CM

## 2018-04-23 LAB
ALBUMIN SERPL BCP-MCNC: 3.1 G/DL
ALP SERPL-CCNC: 52 U/L
ALT SERPL W/O P-5'-P-CCNC: 39 U/L
ANION GAP SERPL CALC-SCNC: 8 MMOL/L
AST SERPL-CCNC: 45 U/L
BILIRUB SERPL-MCNC: 0.6 MG/DL
BUN SERPL-MCNC: 16 MG/DL
CALCIUM SERPL-MCNC: 9.3 MG/DL
CHLORIDE SERPL-SCNC: 109 MMOL/L
CO2 SERPL-SCNC: 25 MMOL/L
CREAT SERPL-MCNC: 0.8 MG/DL
EST. GFR  (AFRICAN AMERICAN): >60 ML/MIN/1.73 M^2
EST. GFR  (NON AFRICAN AMERICAN): >60 ML/MIN/1.73 M^2
ESTIMATED AVG GLUCOSE: 154 MG/DL
GLUCOSE SERPL-MCNC: 147 MG/DL
HBA1C MFR BLD HPLC: 7 %
POTASSIUM SERPL-SCNC: 4.7 MMOL/L
PROT SERPL-MCNC: 6.3 G/DL
SODIUM SERPL-SCNC: 142 MMOL/L

## 2018-04-23 PROCEDURE — 80053 COMPREHEN METABOLIC PANEL: CPT

## 2018-04-23 PROCEDURE — 36415 COLL VENOUS BLD VENIPUNCTURE: CPT | Mod: PO

## 2018-04-23 PROCEDURE — 83036 HEMOGLOBIN GLYCOSYLATED A1C: CPT

## 2018-04-30 ENCOUNTER — OFFICE VISIT (OUTPATIENT)
Dept: ENDOCRINOLOGY | Facility: CLINIC | Age: 23
End: 2018-04-30
Payer: COMMERCIAL

## 2018-04-30 VITALS
SYSTOLIC BLOOD PRESSURE: 104 MMHG | WEIGHT: 148 LBS | BODY MASS INDEX: 27.23 KG/M2 | HEIGHT: 62 IN | HEART RATE: 75 BPM | DIASTOLIC BLOOD PRESSURE: 80 MMHG | RESPIRATION RATE: 17 BRPM

## 2018-04-30 DIAGNOSIS — E10.649 HYPOGLYCEMIA DUE TO TYPE 1 DIABETES MELLITUS: ICD-10-CM

## 2018-04-30 DIAGNOSIS — E10.65 UNCONTROLLED TYPE 1 DIABETES MELLITUS WITH HYPERGLYCEMIA: Primary | ICD-10-CM

## 2018-04-30 DIAGNOSIS — Z46.81 FITTING AND ADJUSTMENT OF INSULIN PUMP: ICD-10-CM

## 2018-04-30 DIAGNOSIS — Z96.41 INSULIN PUMP STATUS: ICD-10-CM

## 2018-04-30 PROCEDURE — 99214 OFFICE O/P EST MOD 30 MIN: CPT | Mod: S$GLB,,, | Performed by: NURSE PRACTITIONER

## 2018-04-30 PROCEDURE — 99999 PR PBB SHADOW E&M-EST. PATIENT-LVL III: CPT | Mod: PBBFAC,,, | Performed by: NURSE PRACTITIONER

## 2018-04-30 PROCEDURE — 3045F PR MOST RECENT HEMOGLOBIN A1C LEVEL 7.0-9.0%: CPT | Mod: CPTII,S$GLB,, | Performed by: NURSE PRACTITIONER

## 2018-04-30 RX ORDER — BLOOD SUGAR DIAGNOSTIC
STRIP MISCELLANEOUS
Qty: 250 EACH | Refills: 11 | Status: SHIPPED | OUTPATIENT
Start: 2018-04-30 | End: 2018-11-06 | Stop reason: SDUPTHER

## 2018-04-30 RX ORDER — INSULIN LISPRO 100 [IU]/ML
INJECTION, SOLUTION INTRAVENOUS; SUBCUTANEOUS
Qty: 90 ML | Refills: 2 | Status: SHIPPED | OUTPATIENT
Start: 2018-04-30 | End: 2018-11-06 | Stop reason: SDUPTHER

## 2018-04-30 NOTE — PROGRESS NOTES
CC: Ms. Tammie Gomez arrives today for management of Type 1  DM and review of chronic medical conditions, as listed in the visit diagnosis section of this encounter. She arrives alone today 1 hr 15 minutes late.      HPI: Ms. Tammie Gomez was diagnosed with Type 1  DM in 12/99. Never admitted for DKA. Previously on MDI. Converted to Omnipod in 8th grade.     Seen in peds endocrine by SHWETA [place] NP, last seen by me in 1/2018. Remains busy with school classes and working as .     Has glucagon kit at home but has not been used, family is knowledgeable.    CURRENT DIABETIC MEDS: insulin pump   Humalog in Omnipod  MN 1.05 u/hr  0500 1.2 u/hr  ICR  B 1:8          L 1:7          D 1:7  ISF 1:40  BG target 120  3 hours active insulin  Glucometer type: PDM    BG readings are checked 5-6 x/day    Omnipod downloaded for past 2 weeks - see attached reports  BG average 153.  TDD 55, ~ 50% basal/ bolus usage  Changes pod q3 days  Hypoglycemia: Yes, usually r/t exercise, during the day but occ during the night; feels s/s when BG < 65  Hypoglycemic Symptoms: palpitations, blurry vision, shaky  Hypoglycemia Treatment: apple juice    Missing Insulin/PO medication doses: No  Timing prandial insulin 5-15 minutes before meals: yes    Exercise: plays tennis between classes    Dietary Habits: guessing at carb counting; eating 3 meals, drinks water and unsweet tea; + snacks on chips/ fruit - will cover with insulin if needed; eats half out half at home    Last DM education appointment: 8/2015 SHWETA Angel RD    REVIEW OF SYSTEMS  Constitutional: no c/o fatigue, weakness, + weight stable  Eyes: denies visual disturbances.  Cardiac: no palpitations or chest pain.  Respiratory: no SOB, HANNA, or cough  GI: no N/V/D, abdominal pain   Skin: no lesions or rashes.  Neuro: no numbness, tingling, or parasthesias.  Endocrine: denies polyphagia, polydipsia, polyuria    Personally reviewed Past Medical, Surgical, Social History.    Vital  "Signs  /80   Pulse 75   Resp 17   Ht 5' 2" (1.575 m)   Wt 67.1 kg (148 lb)   LMP 04/16/2018 (Exact Date)   BMI 27.07 kg/m²     Personally reviewed the below labs:  Hemoglobin A1C   Date Value Ref Range Status   04/23/2018 7.0 (H) 4.0 - 5.6 % Final     Comment:     According to ADA guidelines, hemoglobin A1c <7.0% represents  optimal control in non-pregnant diabetic patients. Different  metrics may apply to specific patient populations.   Standards of Medical Care in Diabetes-2016.  For the purpose of screening for the presence of diabetes:  <5.7%     Consistent with the absence of diabetes  5.7-6.4%  Consistent with increasing risk for diabetes   (prediabetes)  >or=6.5%  Consistent with diabetes  Currently, no consensus exists for use of hemoglobin A1c  for diagnosis of diabetes for children.  This Hemoglobin A1c assay has significant interference with fetal   hemoglobin   (HbF). The results are invalid for patients with abnormal amounts of   HbF,   including those with known Hereditary Persistence   of Fetal Hemoglobin. Heterozygous hemoglobin variants (HbAS, HbAC,   HbAD, HbAE, HbA2) do not significantly interfere with this assay;   however, presence of multiple variants in a sample may impact the %   interference.     01/15/2018 6.8 (H) 4.0 - 5.6 % Final     Comment:     According to ADA guidelines, hemoglobin A1c <7.0% represents  optimal control in non-pregnant diabetic patients. Different  metrics may apply to specific patient populations.   Standards of Medical Care in Diabetes-2016.  For the purpose of screening for the presence of diabetes:  <5.7%     Consistent with the absence of diabetes  5.7-6.4%  Consistent with increasing risk for diabetes   (prediabetes)  >or=6.5%  Consistent with diabetes  Currently, no consensus exists for use of hemoglobin A1c  for diagnosis of diabetes for children.  This Hemoglobin A1c assay has significant interference with fetal   hemoglobin   (HbF). The results " are invalid for patients with abnormal amounts of   HbF,   including those with known Hereditary Persistence   of Fetal Hemoglobin. Heterozygous hemoglobin variants (HbAS, HbAC,   HbAD, HbAE, HbA2) do not significantly interfere with this assay;   however, presence of multiple variants in a sample may impact the %   interference.     05/15/2017 8.7 (H) 4.5 - 6.2 % Final     Comment:     According to ADA guidelines, hemoglobin A1C <7.0% represents  optimal control in non-pregnant diabetic patients.  Different  metrics may apply to specific populations.   Standards of Medical Care in Diabetes - 2016.  For the purpose of screening for the presence of diabetes:  <5.7%     Consistent with the absence of diabetes  5.7-6.4%  Consistent with increasing risk for diabetes   (prediabetes)  >or=6.5%  Consistent with diabetes  Currently no consensus exists for use of hemoglobin A1C  for diagnosis of diabetes for children.           Chemistry        Component Value Date/Time     04/23/2018 0802    K 4.7 04/23/2018 0802     04/23/2018 0802    CO2 25 04/23/2018 0802    BUN 16 04/23/2018 0802    CREATININE 0.8 04/23/2018 0802     (H) 04/23/2018 0802        Component Value Date/Time    CALCIUM 9.3 04/23/2018 0802    ALKPHOS 52 (L) 04/23/2018 0802    AST 45 (H) 04/23/2018 0802    ALT 39 04/23/2018 0802    BILITOT 0.6 04/23/2018 0802    ESTGFRAFRICA >60.0 04/23/2018 0802    EGFRNONAA >60.0 04/23/2018 0802          Lab Results   Component Value Date    CHOL 182 07/18/2016    CHOL 196 08/10/2015    CHOL 173 07/30/2014     Lab Results   Component Value Date    HDL 50 07/18/2016    HDL 64 08/10/2015     Lab Results   Component Value Date    LDLCALC 95.0 07/18/2016    LDLCALC 117.4 08/10/2015     Lab Results   Component Value Date    TRIG 185 (H) 07/18/2016    TRIG 73 08/10/2015     Lab Results   Component Value Date    MICALBCREAT 9.7 01/15/2018     Lab Results   Component Value Date    TSH 1.609 07/18/2016     PHYSICAL  EXAMINATION  Constitutional: Appears well, no distress  Neck: Supple, trachea midline  Respiratory: CTA, even and unlabored.  Cardiovascular: RRR, no murmurs, no carotid bruits. no edema.    Abdomen: soft, non tender, non distended, active BS x 4  Skin: warm and dry; no lipohypertrophy, or acanthosis nigracans observed.    Assessment/Plan  1. Uncontrolled type 1 diabetes mellitus with hyperglycemia  Hemoglobin A1c     Reviewed A1c and BG goals for adult DM.   - lower BG target on pump -- 115  - set basal rate 2 for menustral cycle with 10% higher basal rates.   - reviewed accurate CHO counting and enter GM into pump for better prandial coverage.   - discussed exercise induced hypoglycemia. Use lower temp basal / suspend during exercise. Reviewed hypoglycemia, s/s and appropriate tx options.  - reviewed extend bolus feature if eating high fat/ high CHO meals.   -  Instructed to monitor BG ac/hs/some pp and 3am.      2. Hypoglycemia due to type 1 diabetes mellitus  See above   3. Insulin pump status ; fitting and adjustment  Set basal pattern of menstrual cycle     Orders Placed This Encounter   Procedures    Hemoglobin A1c    TSH    Microalbumin/creatinine urine ratio    Lipid panel     FOLLOW UP  Follow-up in about 4 months (around 8/30/2018).   Labs prior to appt at [facility] lab

## 2018-08-29 ENCOUNTER — OFFICE VISIT (OUTPATIENT)
Dept: OPTOMETRY | Facility: CLINIC | Age: 23
End: 2018-08-29
Payer: COMMERCIAL

## 2018-08-29 DIAGNOSIS — E10.9 TYPE 1 DIABETES MELLITUS WITHOUT COMPLICATION: Primary | ICD-10-CM

## 2018-08-29 PROBLEM — E10.649 HYPOGLYCEMIA DUE TO TYPE 1 DIABETES MELLITUS: Status: RESOLVED | Noted: 2018-01-23 | Resolved: 2018-08-29

## 2018-08-29 PROCEDURE — 99999 PR PBB SHADOW E&M-EST. PATIENT-LVL II: CPT | Mod: PBBFAC,,, | Performed by: OPTOMETRIST

## 2018-08-29 PROCEDURE — 92015 DETERMINE REFRACTIVE STATE: CPT | Mod: S$GLB,,, | Performed by: OPTOMETRIST

## 2018-08-29 PROCEDURE — 92014 COMPRE OPH EXAM EST PT 1/>: CPT | Mod: S$GLB,,, | Performed by: OPTOMETRIST

## 2018-08-29 NOTE — PATIENT INSTRUCTIONS
Diabetic Retinopathy    Diabetic retinopathy is a condition occurring in persons with diabetes, which causes progressive damage to the retina, the light sensitive lining at the back of the eye. It is a serious sight-threatening complication of diabetes.  Diabetes is a disease that interferes with the body's ability to use and store sugar, which can cause many health problems. Too much sugar in the blood can cause damage throughout the body, including the eyes. Over time, diabetes affects the circulatory system of the retina.  Diabetic retinopathy is the result of damage to the tiny blood vessels that nourish the retina. They leak blood and other fluids that cause swelling of retinal tissue and clouding of vision. The condition usually affects both eyes. The longer a person has diabetes, the more likely they will develop diabetic retinopathy. If left untreated, diabetic retinopathy can cause blindness.  Symptoms of diabetic retinopathy include:  Seeing spots or floaters in your field of vision   Blurred vision   Having a dark or empty spot in the center of your vision   Difficulty seeing well at night   In patients with diabetes, prolonged periods of high blood sugar can lead to the accumulation of fluid in the lens inside the eye that controls eye focusing. This changes the curvature of the lens and results in the development of symptoms of blurred vision. The blurring of distance vision as a result of lens swelling will subside once the blood sugar levels are brought under control. Better control of blood sugar levels in patients with diabetes also slows the onset and progression of diabetic retinopathy.  Often there are no visual symptoms in the early stages of diabetic retinopathy. That is why the [employer] Association recommends that everyone with diabetes have a comprehensive dilated eye examination once a year. Early detection and treatment can limit the potential for significant vision loss from  diabetic retinopathy.  Treatment of diabetic retinopathy varies depending on the extent of the disease. It may require laser surgery to seal leaking blood vessels or to discourage new leaky blood vessels from forming. Injections of medications into the eye may be needed to decrease inflammation or stop the formation of new blood vessels. In more advanced cases, a surgical procedure to remove and replace the gel-like fluid in the back of the eye, called the vitreous, may be needed. A retinal detachment, defined as a separation of the light-receiving lining in the back of the eye, resulting from diabetic retinopathy, may also require surgical repair.  If you are a diabetic, you can help prevent or slow the development of diabetic retinopathy by taking your prescribed medication, sticking to your diet, exercising regularly, controlling high blood pressure and avoiding alcohol and smoking.                    What causes diabetic retinopathy?    Non-proliferative diabetic retinopathy (NPDR) is the early state of the disease in which symptoms will be mild or non-existent. In NPDR, the blood vessels in the retina are weakened causing tiny bulges called microanuerysms to protrude from their walls.    Proliferative diabetic retinopathy (PDR) is the more advanced form of the disease. At this stage, new fragile blood vessels can begin to grow in the retina and into the vitreous, the gel-like fluid that fills the back of the eye. The new blood vessel may leak blood into the vitreous, clouding vision.    Diabetic retinopathy is the result of damage caused by diabetes to the small blood vessels located in the retina. Blood vessels damaged from diabetic retinopathy can cause vision loss:  Fluid can leak into the macula, the area of the retina which is responsible for clear central vision. Although small, the macula is the part of the retina that allows us to see colors and fine detail. The fluid causes the macula to swell,  resulting in blurred vision.   In an attempt to improve blood circulation in the retina, new blood vessels may form on its surface. These fragile, abnormal blood vessels can leak blood into the back of the eye and block vision.    Diabetic retinopathy is classified into two types:  1. Non-proliferative diabetic retinopathy (NPDR) is the early state of the disease in which symptoms will be mild or non-existent. In NPDR, the blood vessels in the retina are weakened causing tiny bulges called microanuerysms to protrude from their walls. The microanuerysms may leak fluid into the retina, which may lead to swelling of the macula.   2. Proliferative diabetic retinopathy (PDR) is the more advanced form of the disease. At this stage, circulation problems cause the retina to become oxygen deprived. As a result new fragile blood vessels can begin to grow in the retina and into the vitreous, the gel-like fluid that fills the back of the eye. The new blood vessel may leak blood into the vitreous, clouding vision. Other complications of PDR include detachment of the retina due to scar tissue formation and the development of glaucoma. Glaucoma is an eye disease defined as progressive damage to the optic nerve. In cases of proliferative diabetic retinopathy, the cause of this nerve damage is due to extremely high pressure in the eye. If left untreated, proliferative diabetic retinopathy can cause severe vision loss and even blindness.    How is diabetic retinopathy treated?  Laser treatment (photocoagulation) is used to stop the leakage of blood and fluid into the retina. A laser beam of light can be used to create small burns in areas of the retina with abnormal blood vessels to try to seal the leaks.    Treatment for diabetic retinopathy depends on the stage of the disease and is directed at trying to slow or stop the progression of the disease.  In the early stages of Non-proliferative Diabetic Retinopathy, treatment other than  regular monitoring may not be required. Following your doctor's advice for diet and exercise and keeping blood sugar levels well-controlled can help control the progression of the disease. If the disease advances, leakage of fluid from blood vessels can lead to macular edema. Laser treatment (photocoagulation) is used to stop the leakage of blood and fluid into the retina. A laser beam of light can be used to create small burns in areas of the retina with abnormal blood vessels to try to seal the leaks.  When blood vessel growth is more widespread throughout the retina, as in proliferative diabetic retinopathy, a pattern of scattered laser burns is created across the retina. This causes abnormal blood vessels to shrink and disappear. With this procedure, some side vision may be lost in order to safeguard central vision.  Some bleeding into the vitreous gel may clear up on its own. However, if significant amounts of blood leak into the vitreous fluid in the eye, it will cloud vision and can prevent laser photocoagulation from being used. A surgical procedure called a vitrectomy may be used to remove the blood-filled vitreous and replace it with a clearfluid to maintain the normal shape and health of the eye.    Persons with diabetic retinopathy can suffer significant vision loss. Special low vision devices such as telescopic and microscopic lenses, hand and stand magnifiers, and video magnification systems can be prescribed to make the most of remaining vision.    Courtesy of the American [employer] Association  ADULT VISION  19 to 40 Years of Age    Most adults, aged 19 to 40, enjoy healthy eyes and good vision. The most common eye and vision problems experienced by people in this age group are due to visual stress and eye injuries. By taking proper steps to maintain a healthy lifestyle and protect your eyes from stress and injury, you can avoid many eye and vision problems.  Good vision is important as you pursue a  college degree, begin your career, or perhaps start and raise a family. Here are some things you can do to help maintain healthy eyes and good vision:  Eat Healthy -- As part of a healthful diet, eat five servings of fruits and vegetables each day. Choose foods rich in antioxidants like leafy, green vegetables and fish.     Don't Smoke -- Smoking exposes your eyes to high levels of noxious chemicals and increases the risk for developing age-related macular degeneration (AMD) and cataracts.    Get Regular Exercise -- Exercise improves blood circulation, increases oxygen levels to the eyes and aids in the removal of toxins.    Wear Sunglasses -- Protect your eyes from harmful ultraviolet (UV) rays when outdoors. Choose sunglasses with UVA and UVB protection, to block both forms of ultraviolet rays.     Get Periodic Eye Examinations -- Although vision generally remains stable during these years, some problems may develop without any obvious signs or symptoms. The best way to protect your vision is through regularly scheduled professional eye examinations.  The American [employer] Association recommends that adults aged 19 to 40 receive an eye exam at least every two years. If you are at risk for eye problems due to a family history of eye disease, diabetes, high blood pressure or past vision problems, your doctor of optometry may recommend more frequent exams. In between examinations, if you notice a change in your vision, contact your doctor. Detecting and treating problems early can help maintain good vision for the rest of your life.    Dealing with Visual Stress at School or on the Job  Eyestrain is a common occurrence in today's visually demanding world. A typical college schedule or office workday involves spending long hours reading, working at a desk, or staring at a computer. A poorly designed study or work environment, with elements such as improper lighting, uncomfortable seating, incorrect viewing angles  and improper reading or working distances can add to the visual stress. As the day progresses, the eyes begin to fatigue and eyestrain and discomfort can develop.      A poorly designed study or work environment, with elements such as improper lighting, uncomfortable seating, incorrect viewing angles and improper reading or working distances can add to the visual stress.   The following are several key signs and symptoms of eyestrain:  Sore or tired eyes   Itching or burning sensations in the eyes   Sensitivity to light   Dry or watery eyes   Headaches   Difficulty focusing  Here are some simple steps you can take to minimize eyestrain, particularly during computer work:  Workplace Adjustments  Position the top of your computer monitor below eye level so you look slightly downward when viewing the screen. This will help minimize strain on the eyes and the neck. If you are typing from copy, position the text at the same level as the screen. Adjust the screen brightness so it is most comfortable for you. Avoid glare on the computer screen by adjusting window curtains or blinds, repositioning the monitor, or using a glare reduction filter.    Proper Lighting  Examine the lighting in your work area. Overhead lights can be harsh and often are brighter than necessary. Consider turning some of the lights off for a more comfortable lighting situation. Use an adjustable shaded lamp to provide specific task lighting as needed.    Rest Breaks  Throughout the day, give your eyes a chance to rest. Take several minutes every hour to look away from the computer and allow your eyes to re-adjust. Consider standing up and walking around or doing alternate tasks that do not require extensive near focusing. Blink often to refresh the eyes and use artificial tear solutions, if necessary.    Posture  When seated at a desk, make sure your feet are flat on the floor. Use a chair that is adjustable and provides adequate support for your  back. When working at a computer, your arms should form a 90 degree angle at the elbows and your hands should be tilted up slightly to allow your fingers to travel freely over the keyboard.  Making these simple adjustments to your study or work area can pay big dividends in terms of preventing or reducing eyestrain. If you continue to experience eye-related symptoms, you may have a vision problem requiring treatment. Ask your optometrist.      Ensuring Eye Safety at Work, Home or Play  The [employer] [place] [employer] Occup[employer] Safety and Health reports about 2,000 [place] workers sustain job-related eye injuries that require medical treatment each day. But more injuries to the eye actually result from use or misuse of products at home rather than on the job. Nearly 60 percent of all product-related eye injuries occur in and around the home, according to [employer].  Any injury to the eye has the potential for causing some vision loss or even blindness. Fortunately, most eye injuries can be prevented with the use of proper eye protection. Prevention involves being aware of the common causes of injury and knowing how to protect your eyes-at home, at work and at play.  At Work      Proper eye protection can help to lessen or prevent serious eye injuries.   Eye injuries occurring at work, whether in a factory, on a construction site, on a farm, or in a laboratory, can result from chemical burns, foreign objects in the eye and cuts and scrapes of the cornea. Common causes of injuries include  splashes with chemicals, grease and oil   burns from steam   ultraviolet or infrared radiation exposure; and flying wood or metal chips  Not all forms of safety eyewear provide the same level of protection from flying objects, chemical splashes or radiation exposure. Be sure to wear the appropriate protection for the type of eye hazards in your workplace.              At Home  Using common sense can help protect the eyes  at home. Following 's instructions and safety warnings will help prevent many household product-related eye injuries.  Wearing eye protection while performing certain household activities can prevent eye injuries. Some activities include:  Cleaning the oven or using other strong household chemicals   Chopping wood or doing woodworking   Using motorized equipment or power tools like lawn trimmers and electric drills   Jump-starting a car battery  Non-prescription safety goggles are sold at many home building stores and hardware stores. If you wear prescription glasses, ask your optometrist to make a recommendation on appropriate safety eyewear for household tasks.  At Play  Sprained ankles, skinned knees, and bruises are common occurrences when participating in sports. Unfortunately, so are injuries to the eye.  Regular eyeglasses and contact lenses do not offer adequate protection from sports-related eye injuries. Special eye protection is needed for basketball, football, hockey, baseball and racket sports. Choose the right goggles or protective eyewear for your sport. Your optometrist can advise you on the appropriate eye protection.      Protecting Your Eyes from the Sun  Even on an overcast day, harmful ultraviolet (UV) rays can damage both the skin and the surface of the eye. Over time, unprotected exposure to the sun can increase the risk of certain types of cataracts and cancers of the eyelids. UV, as well as blue light, has the potential to damage the retina, the light-sensitive lining at the back of the eye, which could lead to significant loss of vision. UV damage is cumulative, so it's never too late to begin protecting your eyes from the sun's harmful rays.  The following tips can help prevent eye damage from exposure to UV radiation:  Wear a wide-brimmed hat or cap. It can block up to half of the UV radiation, reducing the amount that may enter from above or around sunglasses.   Wear  sunglasses any time your eyes are exposed to UV radiation, even on cloudy days and during winter months.   Look for quality sunglasses that offer good protection. Sunglasses should block out 99 to 100 percent of both UVA and UBB radiation and screen out 75 to 90 percent of visible light.    Courtesy of The American [employer] Association

## 2018-08-29 NOTE — PROGRESS NOTES
HPI     Tammie Gomez is a 23 y.o. female who is brought in by her father, Geovany,    for continued diabetic eye care. Tammie was last seen on 08/04/2017 for   a diabetic eye exam. She was diagnosed with Type 1 DM around age 5.  It is   being treated with an insulin pump.  Her most recent blood sugar   measurement was 198.     Tammie has minimal myopia. She  has not noticed any new concerning ocular   or visual symptoms. She uses her low minus glasses for driving at night.    Hemoglobin A1C       Date                     Value               Ref Range             Status                04/23/2018               7.0 (H)             4.0 - 5.6 %           Final              (--)blurred vision  (--)Headaches  (--)diplopia  (--)flashes  (--)floaters  (--)pain  (--)Itching  (--)tearing  (--)burning  (--)Dryness  (--) OTC Drops  (--)Photophobia    Last edited by Peter Ocasio, OD on 8/29/2018  8:50 AM. (History)        Review of Systems   Constitutional: Negative for chills, fever and malaise/fatigue.   HENT: Negative for congestion and hearing loss.    Eyes: Negative for blurred vision, double vision, photophobia, pain, discharge and redness.   Respiratory: Negative.    Cardiovascular: Negative.    Gastrointestinal: Negative.    Genitourinary: Negative.    Musculoskeletal: Negative.    Skin: Negative.    Neurological: Negative for seizures.   Endo/Heme/Allergies: Negative for environmental allergies.   Psychiatric/Behavioral: Negative.        Assessment /Plan     For exam results, see Encounter Report.    1. Type 1 diabetes mellitus without complication  - No retinopathy OU    2. Minimal myopia --> continue with current glasses as needed for distance only    Glasses Prescription (8/29/2018)        Sphere Cylinder    Right -0.25 Sphere    Left -0.25 Sphere    Type:  SVL    Expiration Date:  8/30/2019          Parent and Patient education; RTC in 1 year with DFE  (1% Tropicamide), sooner prn

## 2018-11-02 ENCOUNTER — LAB VISIT (OUTPATIENT)
Dept: LAB | Facility: HOSPITAL | Age: 23
End: 2018-11-02
Attending: PEDIATRICS
Payer: COMMERCIAL

## 2018-11-02 DIAGNOSIS — E10.65 UNCONTROLLED TYPE 1 DIABETES MELLITUS WITH HYPERGLYCEMIA: ICD-10-CM

## 2018-11-02 LAB
CHOLEST SERPL-MCNC: 201 MG/DL
CHOLEST/HDLC SERPL: 2.9 {RATIO}
ESTIMATED AVG GLUCOSE: 151 MG/DL
HBA1C MFR BLD HPLC: 6.9 %
HDLC SERPL-MCNC: 69 MG/DL
HDLC SERPL: 34.3 %
LDLC SERPL CALC-MCNC: 119.8 MG/DL
NONHDLC SERPL-MCNC: 132 MG/DL
TRIGL SERPL-MCNC: 61 MG/DL
TSH SERPL DL<=0.005 MIU/L-ACNC: 1.95 UIU/ML

## 2018-11-02 PROCEDURE — 36415 COLL VENOUS BLD VENIPUNCTURE: CPT | Mod: PO

## 2018-11-02 PROCEDURE — 83036 HEMOGLOBIN GLYCOSYLATED A1C: CPT

## 2018-11-02 PROCEDURE — 80061 LIPID PANEL: CPT

## 2018-11-02 PROCEDURE — 84443 ASSAY THYROID STIM HORMONE: CPT

## 2018-11-06 ENCOUNTER — OFFICE VISIT (OUTPATIENT)
Dept: ENDOCRINOLOGY | Facility: CLINIC | Age: 23
End: 2018-11-06
Payer: COMMERCIAL

## 2018-11-06 VITALS
HEART RATE: 64 BPM | HEIGHT: 62 IN | BODY MASS INDEX: 27.23 KG/M2 | DIASTOLIC BLOOD PRESSURE: 80 MMHG | WEIGHT: 148 LBS | SYSTOLIC BLOOD PRESSURE: 106 MMHG | RESPIRATION RATE: 18 BRPM

## 2018-11-06 DIAGNOSIS — E10.649 TYPE 1 DIABETES MELLITUS WITH HYPOGLYCEMIA AND WITHOUT COMA: Primary | ICD-10-CM

## 2018-11-06 DIAGNOSIS — Z96.41 INSULIN PUMP STATUS: ICD-10-CM

## 2018-11-06 DIAGNOSIS — E10.649 HYPOGLYCEMIA DUE TO TYPE 1 DIABETES MELLITUS: ICD-10-CM

## 2018-11-06 DIAGNOSIS — Z46.81 FITTING AND ADJUSTMENT OF INSULIN PUMP: ICD-10-CM

## 2018-11-06 PROCEDURE — 99214 OFFICE O/P EST MOD 30 MIN: CPT | Mod: S$GLB,,, | Performed by: NURSE PRACTITIONER

## 2018-11-06 PROCEDURE — 99999 PR PBB SHADOW E&M-EST. PATIENT-LVL III: CPT | Mod: PBBFAC,,, | Performed by: NURSE PRACTITIONER

## 2018-11-06 RX ORDER — INSULIN LISPRO 100 [IU]/ML
INJECTION, SOLUTION INTRAVENOUS; SUBCUTANEOUS
Qty: 90 ML | Refills: 2 | Status: SHIPPED | OUTPATIENT
Start: 2018-11-06 | End: 2019-04-24 | Stop reason: SDUPTHER

## 2018-11-06 RX ORDER — BLOOD SUGAR DIAGNOSTIC
STRIP MISCELLANEOUS
Qty: 250 EACH | Refills: 11 | Status: SHIPPED | OUTPATIENT
Start: 2018-11-06 | End: 2019-04-24 | Stop reason: SDUPTHER

## 2018-11-06 NOTE — PROGRESS NOTES
"CC: Ms. Tammie Gomez arrives today for management of Type 1  DM and review of chronic medical conditions, as listed in the visit diagnosis section of this encounter.     HPI: Ms. Tammie Gomez was diagnosed with Type 1  DM in 12/99. Never admitted for DKA. Previously on MDI. Converted to Omnipod in 8th grade.     Seen in peds endocrine by SHWETA Birmingham NP, last seen by me in 4/2018. She is using new replacement PDM since last week.   Remains busy with school classes at [employer] and working as  at [employer].     Has glucagon kit at home but has not been used, family is knowledgeable.    CURRENT DIABETIC MEDS: insulin pump   Humalog in Omnipod  MN   1.05 u/hr  0500 1.2 u/hr  ICR  B 1:8          L 1:7          D 1:7  ISF 1:40  BG target 115  3 hours active insulin    Glucometer type: PDM    BG readings are checked 5-6 x/day    Omnipod downloaded for past 1 week - see attached reports  BG range 218-412 - she explains BG so elevated as this is week of her menstrual cycle.  TDD  49, ~ 50% basal/ bolus usage  Changes pod q3 days    Hypoglycemia: Yes, during the day r/t increased activity (usually at work); feels s/s when BG < 65  Hypoglycemic Symptoms: palpitations, blurry vision, shaky  Hypoglycemia Treatment: apple juice    Missing Insulin/PO medication doses: No  Timing prandial insulin 5-15 minutes before meals: yes    Exercise: active at work, previously played tennis between classes    Dietary Habits: "pretty good with carb counting"; eating 3 meals but does not eat CHO at work, drinks water and unsweet tea; minimal snacking - covers with insulin as needed;  eats mostly     Last DM education appointment: 8/2015 SHWETA Angel RD  Last eye exam 8/18 Dr. Ocasio    REVIEW OF SYSTEMS  Constitutional: no c/o fatigue, weakness, + weight stable  Eyes: denies visual disturbances.  Cardiac: no palpitations or chest pain.  Respiratory: no SOB, HANNA, or cough  GI: no N/V/D, abdominal pain   Skin: no lesions or rashes.  Neuro: no " "numbness, tingling, or parasthesias.  Endocrine: denies polyphagia, polydipsia, polyuria    Vital Signs  /80   Pulse 64   Resp 18   Ht 5' 2" (1.575 m)   Wt 67.1 kg (148 lb)   BMI 27.07 kg/m²     Personally reviewed the below labs:  Hemoglobin A1C   Date Value Ref Range Status   11/02/2018 6.9 (H) 4.0 - 5.6 % Final     Comment:     ADA Screening Guidelines:  5.7-6.4%  Consistent with prediabetes  >or=6.5%  Consistent with diabetes  High levels of fetal hemoglobin interfere with the HbA1C  assay. Heterozygous hemoglobin variants (HbS, HgC, etc)do  not significantly interfere with this assay.   However, presence of multiple variants may affect accuracy.     04/23/2018 7.0 (H) 4.0 - 5.6 % Final     Comment:     According to ADA guidelines, hemoglobin A1c <7.0% represents  optimal control in non-pregnant diabetic patients. Different  metrics may apply to specific patient populations.   Standards of Medical Care in Diabetes-2016.  For the purpose of screening for the presence of diabetes:  <5.7%     Consistent with the absence of diabetes  5.7-6.4%  Consistent with increasing risk for diabetes   (prediabetes)  >or=6.5%  Consistent with diabetes  Currently, no consensus exists for use of hemoglobin A1c  for diagnosis of diabetes for children.  This Hemoglobin A1c assay has significant interference with fetal   hemoglobin   (HbF). The results are invalid for patients with abnormal amounts of   HbF,   including those with known Hereditary Persistence   of Fetal Hemoglobin. Heterozygous hemoglobin variants (HbAS, HbAC,   HbAD, HbAE, HbA2) do not significantly interfere with this assay;   however, presence of multiple variants in a sample may impact the %   interference.     01/15/2018 6.8 (H) 4.0 - 5.6 % Final     Comment:     According to ADA guidelines, hemoglobin A1c <7.0% represents  optimal control in non-pregnant diabetic patients. Different  metrics may apply to specific patient populations.   Standards of " Medical Care in Diabetes-2016.  For the purpose of screening for the presence of diabetes:  <5.7%     Consistent with the absence of diabetes  5.7-6.4%  Consistent with increasing risk for diabetes   (prediabetes)  >or=6.5%  Consistent with diabetes  Currently, no consensus exists for use of hemoglobin A1c  for diagnosis of diabetes for children.  This Hemoglobin A1c assay has significant interference with fetal   hemoglobin   (HbF). The results are invalid for patients with abnormal amounts of   HbF,   including those with known Hereditary Persistence   of Fetal Hemoglobin. Heterozygous hemoglobin variants (HbAS, HbAC,   HbAD, HbAE, HbA2) do not significantly interfere with this assay;   however, presence of multiple variants in a sample may impact the %   interference.           Chemistry        Component Value Date/Time     04/23/2018 0802    K 4.7 04/23/2018 0802     04/23/2018 0802    CO2 25 04/23/2018 0802    BUN 16 04/23/2018 0802    CREATININE 0.8 04/23/2018 0802     (H) 04/23/2018 0802        Component Value Date/Time    CALCIUM 9.3 04/23/2018 0802    ALKPHOS 52 (L) 04/23/2018 0802    AST 45 (H) 04/23/2018 0802    ALT 39 04/23/2018 0802    BILITOT 0.6 04/23/2018 0802    ESTGFRAFRICA >60.0 04/23/2018 0802    EGFRNONAA >60.0 04/23/2018 0802        Lab Results   Component Value Date    CHOL 201 (H) 11/02/2018    CHOL 182 07/18/2016    CHOL 196 08/10/2015     Lab Results   Component Value Date    HDL 69 11/02/2018    HDL 50 07/18/2016    HDL 64 08/10/2015     Lab Results   Component Value Date    LDLCALC 119.8 11/02/2018    LDLCALC 95.0 07/18/2016    LDLCALC 117.4 08/10/2015     Lab Results   Component Value Date    TRIG 61 11/02/2018    TRIG 185 (H) 07/18/2016    TRIG 73 08/10/2015     Lab Results   Component Value Date    MICALBCREAT 2.7 11/02/2018     Lab Results   Component Value Date    TSH 1.947 11/02/2018     PHYSICAL EXAMINATION  Constitutional: Appears well, no distress  Neck:  Supple, trachea midline  Respiratory: CTA, even and unlabored.  Cardiovascular: RRR, no murmurs, no carotid bruits. no edema.    Abdomen: soft, non tender, non distended, active BS x 4  Skin: warm and dry; no lipohypertrophy, or acanthosis nigracans observed.    Assessment/Plan  1. Uncontrolled type 1 diabetes mellitus with hypo  glycemia  Hemoglobin A1c     Reviewed A1c and BG goals for adult DM.   - Continue BG target on pump at 115 at her request  - set basal rate 2 for menustral cycle with 15% higher basal rates.   - reviewed accurate CHO counting and enter GM into pump for better prandial coverage.   - discussed exercise induced hypoglycemia. Use lower temp basal / suspend during exercise. Reviewed hypoglycemia, s/s and appropriate tx options.  - reviewed extend bolus feature if eating high fat/ high CHO meals.   -  Instructed to monitor BG ac/hs/some pp and 3am. Discussed personal CGM with Dexcom G6. She will consider.      2. Hypoglycemia due to type 1 diabetes mellitus  See above   3. Insulin pump status ; fitting and adjustment  Set basal pattern of menstrual cycle     Orders Placed This Encounter   Procedures    Hemoglobin A1c     FOLLOW UP  Follow-up in about 6 months (around 5/6/2019).   Labs prior to appt at [facility] lab

## 2019-03-25 NOTE — PATIENT INSTRUCTIONS
1. CARB counting    2. Set pattern A for monthly cycle    3. Exercise - set 10-15% lower temp basal to prevent hypoglycemia      * consider lower blood sugar target   none

## 2019-04-18 ENCOUNTER — TELEPHONE (OUTPATIENT)
Dept: ENDOCRINOLOGY | Facility: CLINIC | Age: 24
End: 2019-04-18

## 2019-04-24 ENCOUNTER — OFFICE VISIT (OUTPATIENT)
Dept: ENDOCRINOLOGY | Facility: CLINIC | Age: 24
End: 2019-04-24
Payer: COMMERCIAL

## 2019-04-24 ENCOUNTER — PATIENT OUTREACH (OUTPATIENT)
Dept: DIABETES | Facility: CLINIC | Age: 24
End: 2019-04-24

## 2019-04-24 ENCOUNTER — LAB VISIT (OUTPATIENT)
Dept: LAB | Facility: HOSPITAL | Age: 24
End: 2019-04-24
Payer: COMMERCIAL

## 2019-04-24 VITALS
RESPIRATION RATE: 18 BRPM | BODY MASS INDEX: 26.96 KG/M2 | SYSTOLIC BLOOD PRESSURE: 110 MMHG | HEART RATE: 72 BPM | DIASTOLIC BLOOD PRESSURE: 60 MMHG | WEIGHT: 146.5 LBS | HEIGHT: 62 IN

## 2019-04-24 DIAGNOSIS — Z96.41 INSULIN PUMP STATUS: ICD-10-CM

## 2019-04-24 DIAGNOSIS — E10.649 TYPE 1 DIABETES MELLITUS WITH HYPOGLYCEMIA AND WITHOUT COMA: ICD-10-CM

## 2019-04-24 DIAGNOSIS — E10.649 HYPOGLYCEMIA DUE TO TYPE 1 DIABETES MELLITUS: ICD-10-CM

## 2019-04-24 DIAGNOSIS — E10.649 TYPE 1 DIABETES MELLITUS WITH HYPOGLYCEMIA AND WITHOUT COMA: Primary | ICD-10-CM

## 2019-04-24 LAB
ESTIMATED AVG GLUCOSE: 171 MG/DL (ref 68–131)
HBA1C MFR BLD HPLC: 7.6 % (ref 4–5.6)

## 2019-04-24 PROCEDURE — 99214 PR OFFICE/OUTPT VISIT, EST, LEVL IV, 30-39 MIN: ICD-10-PCS | Mod: S$GLB,,, | Performed by: INTERNAL MEDICINE

## 2019-04-24 PROCEDURE — 83036 HEMOGLOBIN GLYCOSYLATED A1C: CPT

## 2019-04-24 PROCEDURE — 99214 OFFICE O/P EST MOD 30 MIN: CPT | Mod: S$GLB,,, | Performed by: INTERNAL MEDICINE

## 2019-04-24 PROCEDURE — 99999 PR PBB SHADOW E&M-EST. PATIENT-LVL IV: CPT | Mod: PBBFAC,,,

## 2019-04-24 PROCEDURE — 99999 PR PBB SHADOW E&M-EST. PATIENT-LVL IV: ICD-10-PCS | Mod: PBBFAC,,,

## 2019-04-24 PROCEDURE — 36415 COLL VENOUS BLD VENIPUNCTURE: CPT

## 2019-04-24 PROCEDURE — 3045F PR MOST RECENT HEMOGLOBIN A1C LEVEL 7.0-9.0%: ICD-10-PCS | Mod: CPTII,S$GLB,, | Performed by: INTERNAL MEDICINE

## 2019-04-24 PROCEDURE — 3045F PR MOST RECENT HEMOGLOBIN A1C LEVEL 7.0-9.0%: CPT | Mod: CPTII,S$GLB,, | Performed by: INTERNAL MEDICINE

## 2019-04-24 PROCEDURE — 3008F PR BODY MASS INDEX (BMI) DOCUMENTED: ICD-10-PCS | Mod: CPTII,S$GLB,, | Performed by: INTERNAL MEDICINE

## 2019-04-24 PROCEDURE — 3008F BODY MASS INDEX DOCD: CPT | Mod: CPTII,S$GLB,, | Performed by: INTERNAL MEDICINE

## 2019-04-24 RX ORDER — BLOOD SUGAR DIAGNOSTIC
STRIP MISCELLANEOUS
Qty: 250 EACH | Refills: 11 | Status: SHIPPED | OUTPATIENT
Start: 2019-04-24 | End: 2020-09-27 | Stop reason: SDUPTHER

## 2019-04-24 RX ORDER — INSULIN LISPRO 100 [IU]/ML
INJECTION, SOLUTION INTRAVENOUS; SUBCUTANEOUS
Qty: 90 ML | Refills: 2 | Status: SHIPPED | OUTPATIENT
Start: 2019-04-24 | End: 2019-12-12 | Stop reason: SDUPTHER

## 2019-04-24 NOTE — PROGRESS NOTES
"CHIEF COMPLAINT: Type 1 Diabetes     HPI: Ms. Tammie Gomez is a 24 y.o. female who was diagnosed with type 1 DM in 1999.    Never admitted for DKA. Previously on MDI. Converted to Omnipod in 8th grade.       Seen in peds endocrine by SHWETA Birmingham NP, last seen by NP Isabell in 11/2018.   Remains busy with school classes at [employer] and working as  at [employer]. Wants to be a nurse/NP.     Has glucagon kit at home but has not been used, family is knowledgeable. Needs refill    Seen in conjunction w/ Dr. Ricardo Pinto.      BG readings are checked 5-6 x/day     Hypoglycemia: Yes, during the day r/t increased activity (usually at work); feels s/s when BG < 65  Hypoglycemic Symptoms: palpitations, blurry vision, shaky  Hypoglycemia Treatment: apple juice     Missing Insulin/PO medication doses: No  Timing prandial insulin 5-15 minutes before meals: yes     Exercise: active at work, previously played tennis between classes     Dietary Habits: "pretty good with carb counting"; eating 2 meals/day, skips breakfast due to school schedule. Drinks coffee, unsweetened - does not notice BG increase with coffee.     Last DM education appointment: 8/2015 SHWETA Angel RD  Last eye exam 8/18 Dr. Ocasio    Current diabetes regimen:  Pump: Humalog in Omnipod    Pump Settings  Basal Rate  12A   1.05 u/hr  5P     1.2 u/hr    Basal Program #2 (used during menstrual cycle)  12A   1.15  5P     1.35    Carb Ratio  12A:  8.0  1P:   7.0    ISF  12A: 40    Target: 115/115  IAT: 3.0    TDD 44  Basal 56%; Bolus 44%        Hemoglobin A1C   Date Value Ref Range Status   11/02/2018 6.9 (H) 4.0 - 5.6 % Final     Comment:     ADA Screening Guidelines:  5.7-6.4%  Consistent with prediabetes  >or=6.5%  Consistent with diabetes  High levels of fetal hemoglobin interfere with the HbA1C  assay. Heterozygous hemoglobin variants (HbS, HgC, etc)do  not significantly interfere with this assay.   However, presence of multiple variants may affect accuracy.   "   04/23/2018 7.0 (H) 4.0 - 5.6 % Final     Comment:     According to ADA guidelines, hemoglobin A1c <7.0% represents  optimal control in non-pregnant diabetic patients. Different  metrics may apply to specific patient populations.   Standards of Medical Care in Diabetes-2016.  For the purpose of screening for the presence of diabetes:  <5.7%     Consistent with the absence of diabetes  5.7-6.4%  Consistent with increasing risk for diabetes   (prediabetes)  >or=6.5%  Consistent with diabetes  Currently, no consensus exists for use of hemoglobin A1c  for diagnosis of diabetes for children.  This Hemoglobin A1c assay has significant interference with fetal   hemoglobin   (HbF). The results are invalid for patients with abnormal amounts of   HbF,   including those with known Hereditary Persistence   of Fetal Hemoglobin. Heterozygous hemoglobin variants (HbAS, HbAC,   HbAD, HbAE, HbA2) do not significantly interfere with this assay;   however, presence of multiple variants in a sample may impact the %   interference.     01/15/2018 6.8 (H) 4.0 - 5.6 % Final     Comment:     According to ADA guidelines, hemoglobin A1c <7.0% represents  optimal control in non-pregnant diabetic patients. Different  metrics may apply to specific patient populations.   Standards of Medical Care in Diabetes-2016.  For the purpose of screening for the presence of diabetes:  <5.7%     Consistent with the absence of diabetes  5.7-6.4%  Consistent with increasing risk for diabetes   (prediabetes)  >or=6.5%  Consistent with diabetes  Currently, no consensus exists for use of hemoglobin A1c  for diagnosis of diabetes for children.  This Hemoglobin A1c assay has significant interference with fetal   hemoglobin   (HbF). The results are invalid for patients with abnormal amounts of   HbF,   including those with known Hereditary Persistence   of Fetal Hemoglobin. Heterozygous hemoglobin variants (HbAS, HbAC,   HbAD, HbAE, HbA2) do not significantly  interfere with this assay;   however, presence of multiple variants in a sample may impact the %   interference.          Pt is monitoring blood glucose readings 4 times a day.  Needs >100 strips per month related to fluctuations with blood glucose reading, A1c trends, and activity level.    Hypoglycemic Episodes:  Yes, usually during the day. Lowest 52, has symptoms <65.  Since last visit no severe hypoglycemic episodes requiring observer intervention    DKA: Had some ketonuria over the summer when her omnipod stopped giving insulin - gave 10 unit bolus, then it resolved.    Screening / DM Complications:  Last Podiatry Exam: Today, normal  Neuropathy: No  Last eye exam:  8/2018  CVD/MI: No  Lipids: UTD  Microalbumin: UTD    Lab Results   Component Value Date    TSH 1.947 11/02/2018     Lab Results   Component Value Date    TTGIGA 5 08/10/2015       Pregnancy plans: None, on OCPs.      REVIEW OF SYSTEMS  General: no weakness, fatigue, or weight changes.   Eyes: no double or blurred vision, eye pain, or redness  Cardiovascular: no chest pain, palpitations, edema, or murmurs.   Respiratory: no cough or dyspnea.   GI: no heartburn, nausea, or changes in bowel patterns; good appetite.   Skin: no rashes, dryness, itching, or reactions at insulin injection sites.  Neuro: no numbness, tingling, tremors, or vertigo.   Endocrine: no polyuria, polydipsia, polyphagia, heat or cold intolerance.     Vital Signs  Vitals:    04/24/19 0911   BP: 110/60   Pulse: 72   Resp: 18       PHYSICAL EXAMINATION  Constitutional: Appears well, no distress  Neck: Supple, trachea midline.   Respiratory: No respiratory distress noted; Even and unlabored  Cardiovascular: (+) DP pulses; no edema.   Skin: warm and dry; no injection site reactions, no acanthosis nigracans observed.  Neuro:patient alert and cooperative, normal affect; steady gait.  Psych: Normal mood and affect    Diabetes Foot Exam:   Protective Sensation (w/ 10 gram  monofilament):  Right: Intact  Left: Intact    Visual Inspection:  Normal -  Bilateral    Pedal Pulses:   Right: Present  Left: Present    Posterior tibialis:   Right:Present  Left: Present          Chemistry        Component Value Date/Time     04/23/2018 0802    K 4.7 04/23/2018 0802     04/23/2018 0802    CO2 25 04/23/2018 0802    BUN 16 04/23/2018 0802    CREATININE 0.8 04/23/2018 0802     (H) 04/23/2018 0802        Component Value Date/Time    CALCIUM 9.3 04/23/2018 0802    ALKPHOS 52 (L) 04/23/2018 0802    AST 45 (H) 04/23/2018 0802    ALT 39 04/23/2018 0802    BILITOT 0.6 04/23/2018 0802    ESTGFRAFRICA >60.0 04/23/2018 0802    EGFRNONAA >60.0 04/23/2018 0802           Lab Results   Component Value Date    CHOL 201 (H) 11/02/2018    CHOL 182 07/18/2016    CHOL 196 08/10/2015     Lab Results   Component Value Date    HDL 69 11/02/2018    HDL 50 07/18/2016    HDL 64 08/10/2015     Lab Results   Component Value Date    LDLCALC 119.8 11/02/2018    LDLCALC 95.0 07/18/2016    LDLCALC 117.4 08/10/2015     Lab Results   Component Value Date    TRIG 61 11/02/2018    TRIG 185 (H) 07/18/2016    TRIG 73 08/10/2015     Lab Results   Component Value Date    CHOLHDL 34.3 11/02/2018    CHOLHDL 27.5 07/18/2016    CHOLHDL 32.7 08/10/2015         Assessment/Plan  1. Type 1 diabetes mellitus with hypoglycemia and without coma    2. Insulin pump status    3. Hypoglycemia due to type 1 diabetes mellitus    4. Type I diabetes mellitus, uncontrolled      Type 1 diabetes mellitus with hypoglycemia and without coma  Reviewed goals of therapy are to get the best control we can without hypoglycemia    Medication changes:   Decrease basal rate at 8A to 0.95 units/hr to reduce the risk for daytime hypoglycemia  All other settings remain the same (see above.     Reviewed patient's current insulin regimen. Clarified proper insulin dose and timing in relation to meals, etc. Insulin injection sites and proper rotation  instructed.      Advised frequent self blood glucose monitoring. Discussed the utility of a personal CGM. She is hesitant right now because she doesn't want to wear another device. I showed her the size and ease of application, along with not requiring fingersticks. She will think about it and get back to me.    Hypoglycemia precautions discussed.     Close adherence to lifestyle changes recommended.      Eyes: UTD: Will be due at next visit.  Feet: Foot exam performed today  Kidneys: Urine microalbumin/Cr UTD  HbA1c: Ordered today  Lipids: UTD - due next visit  Glucagon kit: yes  Thyroid/celiac screen: UTD    Insulin pump status  Rx for supplies sent to pharmacy    Hypoglycemia due to type 1 diabetes mellitus  See above.      Glucagon kit: yes; has not needed to use it.    FOLLOW UP  Follow up in about 6 months (around 10/24/2019).     Patient seen in conjunction with LUIS CARLOS Romano.     Ricardo Pinto

## 2019-04-24 NOTE — ASSESSMENT & PLAN NOTE
Reviewed goals of therapy are to get the best control we can without hypoglycemia    Medication changes:   Decrease basal rate at 8A to 0.95 units/hr to reduce the risk for daytime hypoglycemia  All other settings remain the same (see above.     Reviewed patient's current insulin regimen. Clarified proper insulin dose and timing in relation to meals, etc. Insulin injection sites and proper rotation instructed.      Advised frequent self blood glucose monitoring. Discussed the utility of a personal CGM. She is hesitant right now because she doesn't want to wear another device. I showed her the size and ease of application, along with not requiring fingersticks. She will think about it and get back to me.    Hypoglycemia precautions discussed.     Close adherence to lifestyle changes recommended.      Eyes: UTD: Will be due at next visit.  Feet: Foot exam performed today  Kidneys: Urine microalbumin/Cr UTD  HbA1c: Ordered today  Lipids: UTD - due next visit  Glucagon kit: yes  Thyroid/celiac screen: UTD

## 2019-06-03 ENCOUNTER — OFFICE VISIT (OUTPATIENT)
Dept: URGENT CARE | Facility: CLINIC | Age: 24
End: 2019-06-03
Payer: COMMERCIAL

## 2019-06-03 VITALS
SYSTOLIC BLOOD PRESSURE: 128 MMHG | WEIGHT: 145 LBS | TEMPERATURE: 99 F | DIASTOLIC BLOOD PRESSURE: 88 MMHG | HEART RATE: 80 BPM | HEIGHT: 62 IN | BODY MASS INDEX: 26.68 KG/M2 | OXYGEN SATURATION: 99 % | RESPIRATION RATE: 18 BRPM

## 2019-06-03 DIAGNOSIS — J02.9 SORE THROAT: ICD-10-CM

## 2019-06-03 DIAGNOSIS — J02.9 ACUTE PHARYNGITIS, UNSPECIFIED ETIOLOGY: Primary | ICD-10-CM

## 2019-06-03 LAB
CTP QC/QA: YES
S PYO RRNA THROAT QL PROBE: NEGATIVE

## 2019-06-03 PROCEDURE — 87081 CULTURE SCREEN ONLY: CPT

## 2019-06-03 PROCEDURE — 99203 OFFICE O/P NEW LOW 30 MIN: CPT | Mod: S$GLB,,, | Performed by: NURSE PRACTITIONER

## 2019-06-03 PROCEDURE — 87880 POCT RAPID STREP A: ICD-10-PCS | Mod: QW,S$GLB,, | Performed by: NURSE PRACTITIONER

## 2019-06-03 PROCEDURE — 87880 STREP A ASSAY W/OPTIC: CPT | Mod: QW,S$GLB,, | Performed by: NURSE PRACTITIONER

## 2019-06-03 PROCEDURE — 3008F PR BODY MASS INDEX (BMI) DOCUMENTED: ICD-10-PCS | Mod: CPTII,S$GLB,, | Performed by: NURSE PRACTITIONER

## 2019-06-03 PROCEDURE — 99203 PR OFFICE/OUTPT VISIT, NEW, LEVL III, 30-44 MIN: ICD-10-PCS | Mod: S$GLB,,, | Performed by: NURSE PRACTITIONER

## 2019-06-03 PROCEDURE — 3008F BODY MASS INDEX DOCD: CPT | Mod: CPTII,S$GLB,, | Performed by: NURSE PRACTITIONER

## 2019-06-03 RX ORDER — AMOXICILLIN 875 MG/1
875 TABLET, FILM COATED ORAL 2 TIMES DAILY
Qty: 20 TABLET | Refills: 0 | Status: SHIPPED | OUTPATIENT
Start: 2019-06-03 | End: 2019-06-13

## 2019-06-03 NOTE — PATIENT INSTRUCTIONS
Pharyngitis   If your condition worsens or fails to improve we recommend that you receive another evaluation at the ER immediately or contact your PCP to discuss your concerns or return here. You must understand that you've received an urgent care treatment only and that you may be released before all your medical problems are known or treated. You the patient will arrange for followup care as instructed.   If the strept culture was done and returns negative in 3-5 days and you are still having a sore throat, you may need to get a mono spot test done or repeated.   Tylenol or ibuprofen for pain may help as long as you are not allergic to these meds or have a medical condition such as stomach ulcers, liver or kidney disease or taking blood thinners etc that would   prevent you from using these medications.   Rest and fluids will help as well.   If you were prescribed antibiotics and are female and on BCP use additional methods to prevent pregnancy while on the antibiotics and for one cycle after       Pharyngitis (Sore Throat), Report Pending    Pharyngitis (sore throat) is often due to a virus. It can also be caused by the streptococcus, or strep, bacterium, often called strep throat. Both viral and strep infections can cause throat pain that is worse when swallowing, aching all over with headache, and fever. Both types of infections are contagious. They may be spread by coughing, kissing, or touching others after touching your mouth or nose.  A test has been done to find out whether you (or your child, if your child is the patient) have strep throat. Call this facility or your healthcare provider if you were not given your test results. If the test is positive for strep infection, you will need to take antibiotic medicines. A prescription can be called into your pharmacy at that time. If the test is negative, you probably have a viral pharyngitis. This does  not need to be treated with antibiotics. Until you receive the results of the strep test, you should stay home from work. If your child is being tested, he or she should stay home from school.  Home care  · Rest at home. Drink plenty of fluids so you won't get dehydrated.  · If the test is positive for strep, don't go to work or school for the first 2 days of taking the antibiotics. After this time, you will not be contagious. You can then return to work or school if you are feeling better.   · Take the antibiotic medicine for the full 10 days, even if you feel better. This is very important to make sure the infection is treated. It is also important to prevent drug-resistant germs from developing. If you were given an antibiotic shot, you won't need more antibiotics.  · For children: Use acetaminophen for fever, fussiness, or discomfort. In infants older than 6 months of age, you may use ibuprofen instead of acetaminophen. Talk with your child's healthcare provider before giving these medicines if your child has chronic liver or kidney disease or ever had a stomach ulcer or GI bleeding. Never give aspirin to a child under 18 years of age who is ill with a fever. It may cause severe liver damage.  · For adults: Use acetaminophen or ibuprofen to control pain or fever, unless another medicine was prescribed for this. Talk with your healthcare provider before taking these medicines if you have chronic liver or kidney disease or ever had a stomach ulcer or GI bleeding.  · Use throat lozenges or numbing throat sprays to help reduce pain. Gargling with warm salt water will also help reduce throat pain. For this, dissolve 1/2 teaspoon of salt in 1 glass of warm water. To help soothe a sore throat, children can sip on juice or a popsicle. Children 5 years and older can also suck on a lollipop or hard candy.  · Don't eat salty or spicy foods. These can irritate the throat.  Follow-up care  Follow up with your healthcare  provider or our staff if you don't get better over the next week.  When to seek medical advice  Call your healthcare provider right away if any of these occur:  · Fever as directed by your healthcare provider. For children, seek care if:  ¨ Your child is of any age and has repeated fevers above 104°F (40°C).  ¨ Your child is younger than 2 years of age and has a fever of 100.4°F (38°C) that continues for more than 1 day.  ¨ Your child is 2 years old or older and has a fever of 100.4°F (38°C) that continues for more than 3 days.  · New or worsening ear pain, sinus pain, or headache  · Painful lumps in the back of neck  · Stiff neck  · Lymph nodes are getting larger  · Inability to swallow liquids, excessive drooling, or inability to open mouth wide due to throat pain  · Signs of dehydration (very dark urine or no urine, sunken eyes, dizziness)  · Trouble breathing or noisy breathing  · Muffled voice  · New rash  · Child appears to be getting sicker  Date Last Reviewed: 4/13/2015  © 5940-9650 [employer]. [address] [place] [id number]. All rights reserved. This information is not intended as a substitute for professional medical care. Always follow your healthcare professional's instructions.

## 2019-06-03 NOTE — PROGRESS NOTES
"Subjective:       Patient ID: Tammie Gomez is a 24 y.o. female.    Vitals:    06/03/19 1808   BP: 128/88   Pulse: 80   Resp: 18   Temp: 98.5 °F (36.9 °C)   SpO2: 99%   Weight: 65.8 kg (145 lb)   Height: 5' 2" (1.575 m)       Chief Complaint: Sore Throat (started on yesterday )    Patient presents with right-sided sore throat.  States that it started yesterday.  She has pain with swallowing.  Denies any sick contacts.  She is a type 1 insulin-dependent diabetic.  States that her sugars are under control.  Denies any fever chills.    Sore Throat    This is a new problem. The current episode started yesterday. The problem has been gradually worsening. The pain is worse on the right side. There has been no fever. The pain is at a severity of 5/10. The pain is mild. Associated symptoms include swollen glands and trouble swallowing. Pertinent negatives include no abdominal pain, congestion, coughing, diarrhea, drooling, ear discharge, ear pain, headaches, hoarse voice, plugged ear sensation, neck pain, shortness of breath, stridor or vomiting. She has had no exposure to strep or mono. She has tried NSAIDs (30 mins ago ) for the symptoms. The treatment provided no relief.     Review of Systems   Constitution: Negative for chills, fever and malaise/fatigue.   HENT: Positive for sore throat and trouble swallowing. Negative for congestion, drooling, ear discharge, ear pain, hoarse voice and stridor.    Eyes: Negative for discharge and redness.   Cardiovascular: Negative for chest pain, dyspnea on exertion and leg swelling.   Respiratory: Negative for cough, shortness of breath, sputum production and wheezing.    Musculoskeletal: Negative for myalgias and neck pain.   Gastrointestinal: Negative for abdominal pain, diarrhea, nausea and vomiting.   Neurological: Negative for headaches.       Objective:      Physical Exam   Constitutional: She is oriented to person, place, and time. She appears well-developed and " well-nourished. She is cooperative.  Non-toxic appearance. She does not appear ill. No distress.   HENT:   Head: Normocephalic and atraumatic.   Right Ear: Hearing, tympanic membrane, external ear and ear canal normal.   Left Ear: Hearing, tympanic membrane, external ear and ear canal normal.   Nose: Nose normal. No mucosal edema, rhinorrhea or nasal deformity. No epistaxis. Right sinus exhibits no maxillary sinus tenderness and no frontal sinus tenderness. Left sinus exhibits no maxillary sinus tenderness and no frontal sinus tenderness.   Mouth/Throat: Uvula is midline and mucous membranes are normal. No trismus in the jaw. Normal dentition. No uvula swelling. Posterior oropharyngeal erythema present. No oropharyngeal exudate, posterior oropharyngeal edema or tonsillar abscesses. Tonsils are 2+ on the right. Tonsils are 1+ on the left. Tonsillar exudate.   Exudate to right tonsil   Eyes: Conjunctivae and lids are normal. No scleral icterus.   Sclera clear bilat   Neck: Trachea normal, full passive range of motion without pain and phonation normal. Neck supple.   Cardiovascular: Normal rate, regular rhythm, normal heart sounds, intact distal pulses and normal pulses.   Pulmonary/Chest: Effort normal and breath sounds normal. No respiratory distress.   Abdominal: Soft. Normal appearance and bowel sounds are normal. She exhibits no distension. There is no tenderness.   Musculoskeletal: Normal range of motion. She exhibits no edema or deformity.   Lymphadenopathy:     She has cervical adenopathy.        Right cervical: Superficial cervical adenopathy present.        Left cervical: No superficial cervical adenopathy present.   Neurological: She is alert and oriented to person, place, and time. She exhibits normal muscle tone. Coordination normal.   Skin: Skin is warm, dry and intact. She is not diaphoretic. No pallor.   Psychiatric: She has a normal mood and affect. Her speech is normal and behavior is normal. Judgment  and thought content normal. Cognition and memory are normal.   Nursing note and vitals reviewed.      Results for orders placed or performed in visit on 06/03/19   POCT rapid strep A   Result Value Ref Range    Rapid Strep A Screen Negative Negative     Acceptable Yes      Assessment:       1. Acute pharyngitis, unspecified etiology    2. Sore throat        Plan:       Tammie was seen today for sore throat.    Diagnoses and all orders for this visit:    Acute pharyngitis, unspecified etiology  -     Strep A culture, throat  -     amoxicillin (AMOXIL) 875 MG tablet; Take 1 tablet (875 mg total) by mouth 2 (two) times daily. for 10 days  -     (Magic mouthwash) 1:1:1 Benadryl 12.5mg/5ml liq, aluminum & magnesium hydroxide-simehticone (Maalox), lidocaine viscous 2%; Swish and spit 10 mLs every 4 (four) hours as needed.    Sore throat  -     POCT rapid strep A  -     Strep A culture, throat  -     amoxicillin (AMOXIL) 875 MG tablet; Take 1 tablet (875 mg total) by mouth 2 (two) times daily. for 10 days  -     (Magic mouthwash) 1:1:1 Benadryl 12.5mg/5ml liq, aluminum & magnesium hydroxide-simehticone (Maalox), lidocaine viscous 2%; Swish and spit 10 mLs every 4 (four) hours as needed.      Patient Instructions                                                         Pharyngitis   If your condition worsens or fails to improve we recommend that you receive another evaluation at the ER immediately or contact your PCP to discuss your concerns or return here. You must understand that you've received an urgent care treatment only and that you may be released before all your medical problems are known or treated. You the patient will arrange for followup care as instructed.   If the strept culture was done and returns negative in 3-5 days and you are still having a sore throat, you may need to get a mono spot test done or repeated.   Tylenol or ibuprofen for pain may help as long as you are not allergic to these  meds or have a medical condition such as stomach ulcers, liver or kidney disease or taking blood thinners etc that would   prevent you from using these medications.   Rest and fluids will help as well.   If you were prescribed antibiotics and are female and on BCP use additional methods to prevent pregnancy while on the antibiotics and for one cycle after       Pharyngitis (Sore Throat), Report Pending    Pharyngitis (sore throat) is often due to a virus. It can also be caused by the streptococcus, or strep, bacterium, often called strep throat. Both viral and strep infections can cause throat pain that is worse when swallowing, aching all over with headache, and fever. Both types of infections are contagious. They may be spread by coughing, kissing, or touching others after touching your mouth or nose.  A test has been done to find out whether you (or your child, if your child is the patient) have strep throat. Call this facility or your healthcare provider if you were not given your test results. If the test is positive for strep infection, you will need to take antibiotic medicines. A prescription can be called into your pharmacy at that time. If the test is negative, you probably have a viral pharyngitis. This does not need to be treated with antibiotics. Until you receive the results of the strep test, you should stay home from work. If your child is being tested, he or she should stay home from school.  Home care  · Rest at home. Drink plenty of fluids so you won't get dehydrated.  · If the test is positive for strep, don't go to work or school for the first 2 days of taking the antibiotics. After this time, you will not be contagious. You can then return to work or school if you are feeling better.   · Take the antibiotic medicine for the full 10 days, even if you feel better. This is very important to make sure the infection is treated. It is also important to prevent drug-resistant germs from developing. If  you were given an antibiotic shot, you won't need more antibiotics.  · For children: Use acetaminophen for fever, fussiness, or discomfort. In infants older than 6 months of age, you may use ibuprofen instead of acetaminophen. Talk with your child's healthcare provider before giving these medicines if your child has chronic liver or kidney disease or ever had a stomach ulcer or GI bleeding. Never give aspirin to a child under 18 years of age who is ill with a fever. It may cause severe liver damage.  · For adults: Use acetaminophen or ibuprofen to control pain or fever, unless another medicine was prescribed for this. Talk with your healthcare provider before taking these medicines if you have chronic liver or kidney disease or ever had a stomach ulcer or GI bleeding.  · Use throat lozenges or numbing throat sprays to help reduce pain. Gargling with warm salt water will also help reduce throat pain. For this, dissolve 1/2 teaspoon of salt in 1 glass of warm water. To help soothe a sore throat, children can sip on juice or a popsicle. Children 5 years and older can also suck on a lollipop or hard candy.  · Don't eat salty or spicy foods. These can irritate the throat.  Follow-up care  Follow up with your healthcare provider or our staff if you don't get better over the next week.  When to seek medical advice  Call your healthcare provider right away if any of these occur:  · Fever as directed by your healthcare provider. For children, seek care if:  ¨ Your child is of any age and has repeated fevers above 104°F (40°C).  ¨ Your child is younger than 2 years of age and has a fever of 100.4°F (38°C) that continues for more than 1 day.  ¨ Your child is 2 years old or older and has a fever of 100.4°F (38°C) that continues for more than 3 days.  · New or worsening ear pain, sinus pain, or headache  · Painful lumps in the back of neck  · Stiff neck  · Lymph nodes are getting larger  · Inability to swallow liquids, excessive  drooling, or inability to open mouth wide due to throat pain  · Signs of dehydration (very dark urine or no urine, sunken eyes, dizziness)  · Trouble breathing or noisy breathing  · Muffled voice  · New rash  · Child appears to be getting sicker  Date Last Reviewed: 4/13/2015  © 8616-3492 [employer]. [address] [place] [address], [place] [id number]. All rights reserved. This information is not intended as a substitute for professional medical care. Always follow your healthcare professional's instructions.

## 2019-06-03 NOTE — LETTER
Connie 3, 2019      [facility] [address] [place] [facility] [address]  Phone: [phone]  Fax: [phone]       Patient: Tammie Gomez   YOB: 1995  Date of Visit: 06/03/2019    To Whom It May Concern:    Real Gomez  was at [facility] on 06/03/2019. Please excuse from school on 6/4/19. If you have any questions or concerns, or if I can be of further assistance, please do not hesitate to contact me.    Sincerely,        Katharine Gracia, NP

## 2019-06-06 LAB — BACTERIA THROAT CULT: NORMAL

## 2019-06-28 ENCOUNTER — TELEPHONE (OUTPATIENT)
Dept: ENDOCRINOLOGY | Facility: CLINIC | Age: 24
End: 2019-06-28

## 2019-06-28 NOTE — TELEPHONE ENCOUNTER
----- Message from Michell Doherty MA sent at 6/28/2019  4:19 PM CDT -----  Contact: Pt   No I don't have anything and this number is a fax number.  ----- Message -----  From: Jenn Resendiz MA  Sent: 6/27/2019   4:04 PM  To: Michell Doherty MA    Patient came to pump clinic can you see if you help with this thanks  ----- Message -----  From: Jeaneth Baker  Sent: 6/27/2019   2:10 PM  To: [facility] Staff    Pt is requesting a certification of medical necessity be sent to diabetes management for pt Omni pods        Fax [phone]

## 2019-07-05 ENCOUNTER — TELEPHONE (OUTPATIENT)
Dept: ENDOCRINOLOGY | Facility: CLINIC | Age: 24
End: 2019-07-05

## 2019-07-05 NOTE — TELEPHONE ENCOUNTER
----- Message from Chandni Ceballos sent at 7/5/2019 11:42 AM CDT -----  Contact: Diabetes Management [phone]   DM called to verify corrected CMN stating PT changed site every two days. PT father is calling for an update.     [phone] (Fax)   Form will be re faxed in a few minutes.

## 2019-07-12 ENCOUNTER — TELEPHONE (OUTPATIENT)
Dept: ENDOCRINOLOGY | Facility: CLINIC | Age: 24
End: 2019-07-12

## 2019-07-31 ENCOUNTER — OFFICE VISIT (OUTPATIENT)
Dept: OPTOMETRY | Facility: CLINIC | Age: 24
End: 2019-07-31
Payer: COMMERCIAL

## 2019-07-31 DIAGNOSIS — E10.9 TYPE 1 DIABETES MELLITUS WITHOUT COMPLICATION: Primary | ICD-10-CM

## 2019-07-31 PROCEDURE — 99999 PR PBB SHADOW E&M-EST. PATIENT-LVL II: CPT | Mod: PBBFAC,,, | Performed by: OPTOMETRIST

## 2019-07-31 PROCEDURE — 92014 PR EYE EXAM, EST PATIENT,COMPREHESV: ICD-10-PCS | Mod: S$GLB,,, | Performed by: OPTOMETRIST

## 2019-07-31 PROCEDURE — 92015 DETERMINE REFRACTIVE STATE: CPT | Mod: S$GLB,,, | Performed by: OPTOMETRIST

## 2019-07-31 PROCEDURE — 92014 COMPRE OPH EXAM EST PT 1/>: CPT | Mod: S$GLB,,, | Performed by: OPTOMETRIST

## 2019-07-31 PROCEDURE — 92015 PR REFRACTION: ICD-10-PCS | Mod: S$GLB,,, | Performed by: OPTOMETRIST

## 2019-07-31 PROCEDURE — 99999 PR PBB SHADOW E&M-EST. PATIENT-LVL II: ICD-10-PCS | Mod: PBBFAC,,, | Performed by: OPTOMETRIST

## 2019-07-31 NOTE — PROGRESS NOTES
HPI     Tammie Gomez is a 24 y.o. female who returns  for continued diabetic eye   care.   Tammie was diagnosed with Type 1 DM around age 5.  It is being treated   with an insulin pump.  Her most recent blood sugar measurement was 57 this   morning. She was last seen on 08/29/2018.  She also has low bilateral   myopia which is asymptomatic. She has glasses that she wears very rarely   as needed.       Hemoglobin A1C       Date                     Value               Ref Range             Status                04/23/2018               7.0 (H)             4.0 - 5.6 %                        04/24/2019               7.6 (H)             4.0 - 5.6 %           Final              ----------    (+)blurred vision  (--)Headaches  (--)diplopia  (--)flashes  (--)floaters  (--)pain  (--)Itching  (--)tearing  (--)burning  (--)Dryness  (--) OTC Drops  (--)Photophobia      Last edited by Peter Ocasio, OD on 7/31/2019 11:21 AM. (History)        Review of Systems   Constitutional: Negative for chills, fever and malaise/fatigue.   HENT: Negative for congestion and hearing loss.    Eyes: Negative for blurred vision, double vision, photophobia, pain, discharge and redness.   Respiratory: Negative.    Cardiovascular: Negative.    Gastrointestinal: Negative.    Genitourinary: Negative.    Musculoskeletal: Negative.    Skin: Negative.    Neurological: Negative for seizures.   Endo/Heme/Allergies: Negative for environmental allergies.   Psychiatric/Behavioral: Negative.        For exam results, see encounter report    Assessment /Plan     1. Type 1 diabetes mellitus without complication  - No ocular  treatment needed; monitor annually    2. Emmetropia  - Discontinue glasses      Parent & Patient education; RTC in 1 year with DFE; Ok to instill 0.5% Tropicamide after (normal) baseline workup, sooner as needed  at [facility]

## 2019-09-09 ENCOUNTER — OFFICE VISIT (OUTPATIENT)
Dept: ENDOCRINOLOGY | Facility: CLINIC | Age: 24
End: 2019-09-09
Payer: COMMERCIAL

## 2019-09-09 ENCOUNTER — LAB VISIT (OUTPATIENT)
Dept: LAB | Facility: HOSPITAL | Age: 24
End: 2019-09-09
Attending: INTERNAL MEDICINE
Payer: COMMERCIAL

## 2019-09-09 VITALS
BODY MASS INDEX: 28.05 KG/M2 | HEART RATE: 84 BPM | RESPIRATION RATE: 18 BRPM | DIASTOLIC BLOOD PRESSURE: 84 MMHG | SYSTOLIC BLOOD PRESSURE: 110 MMHG | WEIGHT: 152.44 LBS | HEIGHT: 62 IN

## 2019-09-09 DIAGNOSIS — E10.649 TYPE 1 DIABETES MELLITUS WITH HYPOGLYCEMIA AND WITHOUT COMA: ICD-10-CM

## 2019-09-09 DIAGNOSIS — Z96.41 INSULIN PUMP STATUS: ICD-10-CM

## 2019-09-09 DIAGNOSIS — E10.649 TYPE 1 DIABETES MELLITUS WITH HYPOGLYCEMIA AND WITHOUT COMA: Primary | ICD-10-CM

## 2019-09-09 DIAGNOSIS — E10.649 HYPOGLYCEMIA DUE TO TYPE 1 DIABETES MELLITUS: ICD-10-CM

## 2019-09-09 LAB
ESTIMATED AVG GLUCOSE: 134 MG/DL (ref 68–131)
HBA1C MFR BLD HPLC: 6.3 % (ref 4–5.6)

## 2019-09-09 PROCEDURE — 3044F PR MOST RECENT HEMOGLOBIN A1C LEVEL <7.0%: ICD-10-PCS | Mod: CPTII,S$GLB,, | Performed by: INTERNAL MEDICINE

## 2019-09-09 PROCEDURE — 36415 COLL VENOUS BLD VENIPUNCTURE: CPT

## 2019-09-09 PROCEDURE — 99999 PR PBB SHADOW E&M-EST. PATIENT-LVL III: ICD-10-PCS | Mod: PBBFAC,,, | Performed by: INTERNAL MEDICINE

## 2019-09-09 PROCEDURE — 3008F BODY MASS INDEX DOCD: CPT | Mod: CPTII,S$GLB,, | Performed by: INTERNAL MEDICINE

## 2019-09-09 PROCEDURE — 3008F PR BODY MASS INDEX (BMI) DOCUMENTED: ICD-10-PCS | Mod: CPTII,S$GLB,, | Performed by: INTERNAL MEDICINE

## 2019-09-09 PROCEDURE — 99214 OFFICE O/P EST MOD 30 MIN: CPT | Mod: S$GLB,,, | Performed by: INTERNAL MEDICINE

## 2019-09-09 PROCEDURE — 83036 HEMOGLOBIN GLYCOSYLATED A1C: CPT

## 2019-09-09 PROCEDURE — 99214 PR OFFICE/OUTPT VISIT, EST, LEVL IV, 30-39 MIN: ICD-10-PCS | Mod: S$GLB,,, | Performed by: INTERNAL MEDICINE

## 2019-09-09 PROCEDURE — 3044F HG A1C LEVEL LT 7.0%: CPT | Mod: CPTII,S$GLB,, | Performed by: INTERNAL MEDICINE

## 2019-09-09 PROCEDURE — 99999 PR PBB SHADOW E&M-EST. PATIENT-LVL III: CPT | Mod: PBBFAC,,, | Performed by: INTERNAL MEDICINE

## 2019-09-09 RX ORDER — INSULIN GLARGINE 100 [IU]/ML
INJECTION, SOLUTION SUBCUTANEOUS
Qty: 1 SYRINGE | Refills: 3 | Status: SHIPPED | OUTPATIENT
Start: 2019-09-09 | End: 2023-04-06 | Stop reason: SDUPTHER

## 2019-09-09 NOTE — PROGRESS NOTES
Pump Clinic Return Visit    Chief Complaint   Patient presents with    Diabetes     Previously seen by Sina Romano, new to me      HPI:Tammie Gomez is a 24 y.o. female who was diagnosed with type 1 DM in 1999     Never admitted for DKA. Previously on MDI. Converted to Omnipod in 8th grade.       Remains busy with school classes at [employer] and working as  at a [employer]. Wants to be a nurse/NP.     Has glucagon kit at home but has not been used, family is knowledgeable    Since last visit has been trying to do less snacking particularly when at work and with this has noticed more hypoglycemia    Current diabetes regimen:  Pump: Omnipod with Humalog    Pump Settings  Basal Rate  MN-8AM 1.2  8AM-5PM 0.950  5PM-MN 1.2    TDD 26.550     Basal Program #2 (used during menstrual cycle)  12A   1.15  5P     1.35     Carb Ratio  12A:  8.0  1P:   7.0     ISF  12A: 40     Target: 115/115  IAT: 3.0     TDD 50.2  Basal 55%; Bolus 49%    Lab Results   Component Value Date    HGBA1C 6.3 (H) 09/09/2019       Glucose Monitoring:  Meter: omnipod  Pump report downloaded and reviewed, see report in media tab    Interested in sensor but concerned about 2 devices    significant glycemic variability with both hypo and hyperglycemia at various times.     High in AM if large, fatty meal the night before. Often will eat only one meal a day which then leads to highs next AM. Helps with extended bolus          Pt is monitoring blood glucose readings 4 times a day.  Needs >100 strips per month related to fluctuations with blood glucose reading, A1c trends, and activity level.    Hypoglycemic Episodes:  Lows overnight with smaller dinners  Lows in later afternoon/early evening since trying to snack less at work    Since last visit no severe hypoglycemic episodes requiring observer intervention    DKA: denies    Screening / DM Complications:  Last Foot Exam: 4/2019  Neuropathy: denies  Last eye exam:  7/31/2019 no laser surgery  "or   CVD/MI: denies    Lab Results   Component Value Date    TSH 1.947 11/02/2018     Lab Results   Component Value Date    TTGIGA 5 08/10/2015       Diet/Exercise:  Recently has started eating healthier  Less snacking between meals  Often skips breakfast-only coffee usually (5 gms)  Lunch: salad with chicken  Dinner: similar    Relatively low carbs  Fruit for snacks    Pregnancy plans:none on OCP    Review of Systems   Constitutional: Negative for activity change and unexpected weight change.   Respiratory: Negative for shortness of breath.    Cardiovascular: Negative for chest pain and leg swelling.   Gastrointestinal: Negative for abdominal pain.   Genitourinary: Negative for dysuria.   Skin: Negative for rash.   Neurological: Negative for headaches.   Psychiatric/Behavioral: Negative for confusion.       Vital Signs  /84   Pulse 84   Resp 18   Ht 5' 2" (1.575 m)   Wt 69.2 kg (152 lb 7.2 oz)   BMI 27.88 kg/m²     Physical Exam   Constitutional: She is oriented to person, place, and time. She appears well-developed and well-nourished.   HENT:   Head: Normocephalic.   Eyes: Conjunctivae and EOM are normal.   Pulmonary/Chest: Effort normal.   Musculoskeletal: Normal range of motion. She exhibits no edema.   Neurological: She is alert and oriented to person, place, and time.   Skin: Skin is warm. No rash noted.           Chemistry        Component Value Date/Time     04/23/2018 0802    K 4.7 04/23/2018 0802     04/23/2018 0802    CO2 25 04/23/2018 0802    BUN 16 04/23/2018 0802    CREATININE 0.8 04/23/2018 0802     (H) 04/23/2018 0802        Component Value Date/Time    CALCIUM 9.3 04/23/2018 0802    ALKPHOS 52 (L) 04/23/2018 0802    AST 45 (H) 04/23/2018 0802    ALT 39 04/23/2018 0802    BILITOT 0.6 04/23/2018 0802    ESTGFRAFRICA >60.0 04/23/2018 0802    EGFRNONAA >60.0 04/23/2018 0802           Lab Results   Component Value Date    CHOL 201 (H) 11/02/2018    CHOL 182 07/18/2016    " CHOL 196 08/10/2015     Lab Results   Component Value Date    HDL 69 11/02/2018    HDL 50 07/18/2016    HDL 64 08/10/2015     Lab Results   Component Value Date    LDLCALC 119.8 11/02/2018    LDLCALC 95.0 07/18/2016    LDLCALC 117.4 08/10/2015     Lab Results   Component Value Date    TRIG 61 11/02/2018    TRIG 185 (H) 07/18/2016    TRIG 73 08/10/2015     Lab Results   Component Value Date    CHOLHDL 34.3 11/02/2018    CHOLHDL 27.5 07/18/2016    CHOLHDL 32.7 08/10/2015           Assessment/Plan  Type 1 diabetes mellitus with hypoglycemia and without coma  Pump download reviewed and significant for both hypo and hyperglycemia related to changes in diet, recent reduction in carbs    Will change settings as below:  Basal Rate  MN-8AM 1.2-->1.0  8AM-5PM 0.950-->0.850  5PM-MN 1.2-->1.1    TDD 23      Carb Ratio  12A:  8.0  1P:   7.0-->1:8     ISF  12A: 40  9:30 PM 45     Target: 115/115  IAT: 3-->3.5 hours    Discussed use of Dexcom . She is hesitant to wear another device. Talked about trying with professional CGM or limited trial of Dexcom and she will consider    Repeat A1c today      Hypoglycemia due to type 1 diabetes mellitus  See above      RTC 3 months pump clinic with labs before      Glucagon kit: yes    FOLLOW UP  Follow up in about 3 months (around 12/9/2019).       Pump backup plan  If the insulin pump is non functional and discontinued for anticipated more than 20 hours, please give daily injections of:  Long acting insulin 26 units daily  Short acting insulin for meals according to carb ratios and sensitivity factor in the pump.    When the insulin pump is restarted, do not restart basal rates until at least 22 hours after the last long acting insulin injection. You can set a 0% temporary basal setting that will last until this time and use your pump to bolus for meals and correction.    For any technical insulin pump issues, please contact the insulin pump company; the toll free number is printed on the  label on the back of the insulin pump.

## 2019-09-09 NOTE — ASSESSMENT & PLAN NOTE
Pump download reviewed and significant for both hypo and hyperglycemia related to changes in diet, recent reduction in carbs    Will change settings as below:  Basal Rate  MN-8AM 1.2-->1.0  8AM-5PM 0.950-->0.850  5PM-MN 1.2-->1.1    TDD 23      Carb Ratio  12A:  8.0  1P:   7.0-->1:8     ISF  12A: 40  9:30 PM 45     Target: 115/115  IAT: 3-->3.5 hours    Discussed use of Dexcom . She is hesitant to wear another device. Talked about trying with professional CGM or limited trial of Dexcom and she will consider    Repeat A1c today

## 2019-09-09 NOTE — PATIENT INSTRUCTIONS
Pump backup plan  If the insulin pump is non functional and discontinued for anticipated more than 20 hours, please give daily injections of:  Long acting insulin 26 units daily  Short acting insulin for meals according to carb ratios and sensitivity factor in the pump.    When the insulin pump is restarted, do not restart basal rates until at least 22 hours after the last long acting insulin injection. You can set a 0% temporary basal setting that will last until this time and use your pump to bolus for meals and correction.    For any technical insulin pump issues, please contact the insulin pump company; the toll free number is printed on the label on the back of the insulin pump.

## 2019-11-21 DIAGNOSIS — E10.649 TYPE 1 DIABETES MELLITUS WITH HYPOGLYCEMIA AND WITHOUT COMA: ICD-10-CM

## 2019-11-21 RX ORDER — BLOOD SUGAR DIAGNOSTIC
STRIP MISCELLANEOUS
Qty: 250 STRIP | Refills: 11 | Status: SHIPPED | OUTPATIENT
Start: 2019-11-21 | End: 2019-12-12 | Stop reason: SDUPTHER

## 2019-12-04 ENCOUNTER — LAB VISIT (OUTPATIENT)
Dept: LAB | Facility: HOSPITAL | Age: 24
End: 2019-12-04
Attending: INTERNAL MEDICINE
Payer: COMMERCIAL

## 2019-12-04 DIAGNOSIS — E10.649 TYPE 1 DIABETES MELLITUS WITH HYPOGLYCEMIA AND WITHOUT COMA: ICD-10-CM

## 2019-12-04 LAB
ANION GAP SERPL CALC-SCNC: 11 MMOL/L (ref 8–16)
BUN SERPL-MCNC: 11 MG/DL (ref 6–20)
CALCIUM SERPL-MCNC: 9.5 MG/DL (ref 8.7–10.5)
CHLORIDE SERPL-SCNC: 105 MMOL/L (ref 95–110)
CHOLEST SERPL-MCNC: 203 MG/DL (ref 120–199)
CHOLEST/HDLC SERPL: 2.6 {RATIO} (ref 2–5)
CO2 SERPL-SCNC: 26 MMOL/L (ref 23–29)
CREAT SERPL-MCNC: 0.8 MG/DL (ref 0.5–1.4)
EST. GFR  (AFRICAN AMERICAN): >60 ML/MIN/1.73 M^2
EST. GFR  (NON AFRICAN AMERICAN): >60 ML/MIN/1.73 M^2
ESTIMATED AVG GLUCOSE: 151 MG/DL (ref 68–131)
GLUCOSE SERPL-MCNC: 84 MG/DL (ref 70–110)
HBA1C MFR BLD HPLC: 6.9 % (ref 4–5.6)
HDLC SERPL-MCNC: 77 MG/DL (ref 40–75)
HDLC SERPL: 37.9 % (ref 20–50)
LDLC SERPL CALC-MCNC: 111.2 MG/DL (ref 63–159)
NONHDLC SERPL-MCNC: 126 MG/DL
POTASSIUM SERPL-SCNC: 3.8 MMOL/L (ref 3.5–5.1)
SODIUM SERPL-SCNC: 142 MMOL/L (ref 136–145)
TRIGL SERPL-MCNC: 74 MG/DL (ref 30–150)
TSH SERPL DL<=0.005 MIU/L-ACNC: 2.41 UIU/ML (ref 0.4–4)

## 2019-12-04 PROCEDURE — 80061 LIPID PANEL: CPT

## 2019-12-04 PROCEDURE — 83036 HEMOGLOBIN GLYCOSYLATED A1C: CPT

## 2019-12-04 PROCEDURE — 36415 COLL VENOUS BLD VENIPUNCTURE: CPT | Mod: PN

## 2019-12-04 PROCEDURE — 80048 BASIC METABOLIC PNL TOTAL CA: CPT

## 2019-12-04 PROCEDURE — 84443 ASSAY THYROID STIM HORMONE: CPT

## 2019-12-11 ENCOUNTER — PATIENT MESSAGE (OUTPATIENT)
Dept: ENDOCRINOLOGY | Facility: CLINIC | Age: 24
End: 2019-12-11

## 2019-12-11 ENCOUNTER — PATIENT OUTREACH (OUTPATIENT)
Dept: DIABETES | Facility: CLINIC | Age: 24
End: 2019-12-11

## 2019-12-11 ENCOUNTER — OFFICE VISIT (OUTPATIENT)
Dept: ENDOCRINOLOGY | Facility: CLINIC | Age: 24
End: 2019-12-11
Payer: COMMERCIAL

## 2019-12-11 VITALS
BODY MASS INDEX: 26.37 KG/M2 | SYSTOLIC BLOOD PRESSURE: 120 MMHG | WEIGHT: 143.31 LBS | DIASTOLIC BLOOD PRESSURE: 75 MMHG | HEIGHT: 62 IN

## 2019-12-11 DIAGNOSIS — Z96.41 INSULIN PUMP STATUS: ICD-10-CM

## 2019-12-11 DIAGNOSIS — E10.649 TYPE 1 DIABETES MELLITUS WITH HYPOGLYCEMIA AND WITHOUT COMA: Primary | ICD-10-CM

## 2019-12-11 PROCEDURE — 3044F PR MOST RECENT HEMOGLOBIN A1C LEVEL <7.0%: ICD-10-PCS | Mod: CPTII,S$GLB,, | Performed by: INTERNAL MEDICINE

## 2019-12-11 PROCEDURE — 3008F PR BODY MASS INDEX (BMI) DOCUMENTED: ICD-10-PCS | Mod: CPTII,S$GLB,, | Performed by: INTERNAL MEDICINE

## 2019-12-11 PROCEDURE — 99999 PR PBB SHADOW E&M-EST. PATIENT-LVL IV: CPT | Mod: PBBFAC,,,

## 2019-12-11 PROCEDURE — 99999 PR PBB SHADOW E&M-EST. PATIENT-LVL IV: ICD-10-PCS | Mod: PBBFAC,,,

## 2019-12-11 PROCEDURE — 99214 OFFICE O/P EST MOD 30 MIN: CPT | Mod: 25,S$GLB,, | Performed by: INTERNAL MEDICINE

## 2019-12-11 PROCEDURE — 99214 PR OFFICE/OUTPT VISIT, EST, LEVL IV, 30-39 MIN: ICD-10-PCS | Mod: 25,S$GLB,, | Performed by: INTERNAL MEDICINE

## 2019-12-11 PROCEDURE — 3044F HG A1C LEVEL LT 7.0%: CPT | Mod: CPTII,S$GLB,, | Performed by: INTERNAL MEDICINE

## 2019-12-11 PROCEDURE — 3008F BODY MASS INDEX DOCD: CPT | Mod: CPTII,S$GLB,, | Performed by: INTERNAL MEDICINE

## 2019-12-11 NOTE — PROGRESS NOTES
Pump Clinic Return Visit    Chief Complaint   Patient presents with    Follow-up        HPI:Tammie Gomez is a 24 y.o. female who was diagnosed with type 1 DM in 1999. Reports has not been interested in CGM due to wearing another device. Reports pod was leaking on thanksgiving day so had significant hyperglycemia but did not have issues going forward.    Current diabetes regimen:  Pump: Omnipod since 8th grade    Pump Settings  Basal Rate  12A: 1.0 U/h  8A:   0.85 U/h  5P:  1.1 U/h    Carb Ratio  12A:  1:8    ISF  12A: 1:40    Target: 115/115  IAT: 3.5h    TDD 43.6  Basal 51%; Bolus 49%    Not using a CGM currently    Lab Results   Component Value Date    HGBA1C 6.9 (H) 12/04/2019       Glucose Monitoring:  Meter/CGM: No CGM, uses meter  Pump and meter report downloaded and reviewed, see report in media tab    Pt is monitoring blood glucose readings 4 times a day.  Needs >100 strips per month related to fluctuations with blood glucose reading, A1c trends, and activity level.    Hypoglycemic Episodes:  Yes, mostly in the middle of night, wakes up sweating  Sometimes related to alcohol intake  Since last visit no severe hypoglycemic episodes requiring observer intervention, has glucagon kit, parents know how to use it    Remains busy with school classes at [employer] and working as .      DKA: Never    Screening / DM Complications:  Neuropathy: No  Last foot exam : 09/09/2019  Last eye exam : 07/31/2019;  no laser surgery or DR  CVD/MI: No    Lab Results   Component Value Date    TSH 2.407 12/04/2019     Lab Results   Component Value Date    TTGIGA 5 08/10/2015       Diet/Exercise:  Does not exercise, walks to work 1 mile daily  Only eats 2 meals lunch around 2 pm and dinner 9pm, varies food but tries to stay at 60-70 g per meal    Pregnancy plans: No    Review of Systems   Constitutional: Negative for unexpected weight change.   Eyes: Negative for visual disturbance.   Respiratory: Negative for shortness of  "breath.    Cardiovascular: Negative for chest pain.   Gastrointestinal: Negative for abdominal pain.   Musculoskeletal: Negative for arthralgias.   Skin: Negative for wound.   Allergic/Immunologic: Negative for food allergies.   Neurological: Negative for headaches.   Hematological: Does not bruise/bleed easily.       Vital Signs  /75   Ht 5' 2" (1.575 m)   Wt 65 kg (143 lb 4.8 oz)   BMI 26.21 kg/m²     Physical Exam   Constitutional: She is oriented to person, place, and time. She appears well-developed and well-nourished.   HENT:   Head: Normocephalic and atraumatic.   Neck: Neck supple. No thyromegaly present.   Cardiovascular: Normal rate, regular rhythm and normal heart sounds.   Pulmonary/Chest: Effort normal. No respiratory distress.   Abdominal: Soft. There is no tenderness.   Neurological: She is alert and oriented to person, place, and time.   Skin: Skin is warm. No rash noted.   Psychiatric: She has a normal mood and affect. Her behavior is normal.               Chemistry        Component Value Date/Time     12/04/2019 0814    K 3.8 12/04/2019 0814     12/04/2019 0814    CO2 26 12/04/2019 0814    BUN 11 12/04/2019 0814    CREATININE 0.8 12/04/2019 0814    GLU 84 12/04/2019 0814        Component Value Date/Time    CALCIUM 9.5 12/04/2019 0814    ALKPHOS 52 (L) 04/23/2018 0802    AST 45 (H) 04/23/2018 0802    ALT 39 04/23/2018 0802    BILITOT 0.6 04/23/2018 0802    ESTGFRAFRICA >60.0 12/04/2019 0814    EGFRNONAA >60.0 12/04/2019 0814           Lab Results   Component Value Date    MICALBCREAT 2.7 11/02/2018     Lab Results   Component Value Date    CHOL 203 (H) 12/04/2019    CHOL 201 (H) 11/02/2018    CHOL 182 07/18/2016     Lab Results   Component Value Date    HDL 77 (H) 12/04/2019    HDL 69 11/02/2018    HDL 50 07/18/2016     Lab Results   Component Value Date    LDLCALC 111.2 12/04/2019    LDLCALC 119.8 11/02/2018    LDLCALC 95.0 07/18/2016     Lab Results   Component Value Date    " TRIG 74 12/04/2019    TRIG 61 11/02/2018    TRIG 185 (H) 07/18/2016     Lab Results   Component Value Date    CHOLHDL 37.9 12/04/2019    CHOLHDL 34.3 11/02/2018    CHOLHDL 27.5 07/18/2016           Assessment/Plan  Type 1 diabetes mellitus with hypoglycemia and without coma  BG checks reflect significant glycemic variability with BG ranging 28 - 433. Concern for hypoglycemia overnight given A1c is lower than expected for average BG readings. She would greatly benefit from DexCom CGM given checking BGs 4 times daily, significant glycemic variability and frequent hypoglycemia episodes. We discussed use and is willing to try. For now will not make changes given not enough data with isolated BG readings.    Plan  - Continue Omnipod insulin pump with following settings  Basal Rate  12A: 1.0 U/h  8A:   0.85 U/h  5P:  1.1 U/h     Carb Ratio  12A:  1:8     ISF  12A: 1:40     Target: 115/115  IAT: 3.5h    - Ordered DexCom G6 sensors, transmitter and   - DM education appointment to set up DexCom CGM  - Up to date with eye and foot exams  - Will review DexCom report once patient gets it and adjust settings as needed  - Follow-up in 3 months      Insulin pump status  Change pods every 3 days        Glucagon kit: yes, parents know how to use it    FOLLOW UP  Follow up in about 3 months (around 3/11/2020).       Pump backup plan  If the insulin pump is non functional and discontinued for anticipated more than 20 hours, please give daily injections of:  Long acting insulin 22 units daily  Short acting insulin for meals according to carb ratios and sensitivity factor in the pump.    When the insulin pump is restarted, do not restart basal rates until at least 22 hours after the last long acting insulin injection. You can set a 0% temporary basal setting that will last until this time and use your pump to bolus for meals and correction.    For any technical insulin pump issues, please contact the insulin pump company; the  toll free number is printed on the label on the back of the insulin pump.      Anders Cano MD  Endocrinology Fellow

## 2019-12-11 NOTE — PROGRESS NOTES
Omnipod download reviewed with MD Roman and NP.; general recommendations discussed with NP and pt.

## 2019-12-11 NOTE — ASSESSMENT & PLAN NOTE
BG checks reflect significant glycemic variability with BG ranging 28 - 433. Concern for hypoglycemia overnight given A1c is lower than expected for average BG readings. She would greatly benefit from DexCom CGM given checking BGs 4 times daily, significant glycemic variability and frequent hypoglycemia episodes. We discussed use and is willing to try. For now will not make changes given not enough date with isolated BG readings.    Plan  - Continue Omnipod insulin pump with following settings  Basal Rate  12A: 1.0 U/h  8A:   0.85 U/h  5P:  1.1 U/h     Carb Ratio  12A:  1:8     ISF  12A: 1:40     Target: 115/115  IAT: 3.5h    - Ordered DexCom G6 sensors, transmitter and   - DM education appointment to set up DexCom CGM  - Up to date with eye and foot exams  - Will review DexCom report once patient gets it and adjust settings as needed  - Follow-up in 3 months

## 2019-12-11 NOTE — PROGRESS NOTES
I have reviewed and concur with Dr. Anders Childress's history, physical, assessment, and plan.  I have personally interviewed and examined the patient.  See below addendum for my evaluation and additional findings.    Blood sugars fluctuate widely due to not checking blood sugars regularly as well as irregular eating patterns thus it is difficult to recommend changes to pump settings at this time.  Would like to reduce lows first.  She will try using dexcom G6.  She lives w/ parents and has rescue glucagon at home.  Some of lows seem to be related to alcohol intake.  Advised her to not drink alcohol without carb intake as alcohol inhibits gluconeogenesis and can cause overnight lows.  She should try eating carbs with fat to prolong the glucose absorption when she will be drinking hard alcohol.  Will adjust pump settings at next visit based on CGM data.      Alexis Calero MD

## 2019-12-12 DIAGNOSIS — E10.649 TYPE 1 DIABETES MELLITUS WITH HYPOGLYCEMIA AND WITHOUT COMA: ICD-10-CM

## 2019-12-12 RX ORDER — BLOOD SUGAR DIAGNOSTIC
STRIP MISCELLANEOUS
Qty: 250 STRIP | Refills: 11 | Status: SHIPPED | OUTPATIENT
Start: 2019-12-12 | End: 2020-07-06 | Stop reason: SDUPTHER

## 2019-12-12 RX ORDER — INSULIN LISPRO 100 [IU]/ML
INJECTION, SOLUTION INTRAVENOUS; SUBCUTANEOUS
Qty: 90 ML | Refills: 2 | Status: SHIPPED | OUTPATIENT
Start: 2019-12-12 | End: 2020-07-06 | Stop reason: SDUPTHER

## 2020-01-10 ENCOUNTER — PES CALL (OUTPATIENT)
Dept: ADMINISTRATIVE | Facility: CLINIC | Age: 25
End: 2020-01-10

## 2020-01-15 ENCOUNTER — TELEPHONE (OUTPATIENT)
Dept: ENDOCRINOLOGY | Facility: CLINIC | Age: 25
End: 2020-01-15

## 2020-01-15 NOTE — TELEPHONE ENCOUNTER
----- Message from Aurelia Gamez MA sent at 1/15/2020  8:33 AM CST -----  Contact: [phone] Lili with diabetes management  supplies  [phone] Lili with diabetes management  Supplies     checking on the status of a fax certificate for medical necessity and chart notes that was sent on 1/6/2020 and again on 1/10/2020

## 2020-03-10 ENCOUNTER — TELEPHONE (OUTPATIENT)
Dept: ENDOCRINOLOGY | Facility: CLINIC | Age: 25
End: 2020-03-10

## 2020-03-10 NOTE — TELEPHONE ENCOUNTER
Called pt and left VM to call back to re schedule pump clinic for tomorrow. Contact no was provided .

## 2020-03-10 NOTE — TELEPHONE ENCOUNTER
----- Message from Angeline Raymundo sent at 3/10/2020  2:31 PM CDT -----  Contact: Pt   Type:  Sooner Apoointment Request    Caller is requesting a appointment reschedule.   Name of Caller: Tammie   When is the first available appointment? N/a   Symptoms: n/a   Would the patient rather a call back or a response via [facility]? Call back   Best Call Back Number: [phone]  Additional Information: Pt had an appt scheduled for 03/11/2020 for pump clinic and wanted to be rescheduled.

## 2020-03-17 ENCOUNTER — PATIENT MESSAGE (OUTPATIENT)
Dept: ENDOCRINOLOGY | Facility: CLINIC | Age: 25
End: 2020-03-17

## 2020-06-05 DIAGNOSIS — E10.649 TYPE 1 DIABETES MELLITUS WITH HYPOGLYCEMIA AND WITHOUT COMA: Primary | ICD-10-CM

## 2020-06-12 ENCOUNTER — LAB VISIT (OUTPATIENT)
Dept: LAB | Facility: HOSPITAL | Age: 25
End: 2020-06-12
Attending: STUDENT IN AN ORGANIZED HEALTH CARE EDUCATION/TRAINING PROGRAM
Payer: COMMERCIAL

## 2020-06-12 DIAGNOSIS — E10.649 TYPE 1 DIABETES MELLITUS WITH HYPOGLYCEMIA AND WITHOUT COMA: ICD-10-CM

## 2020-06-12 LAB
ALBUMIN SERPL BCP-MCNC: 3.6 G/DL (ref 3.5–5.2)
ALP SERPL-CCNC: 50 U/L (ref 55–135)
ALT SERPL W/O P-5'-P-CCNC: 16 U/L (ref 10–44)
ANION GAP SERPL CALC-SCNC: 9 MMOL/L (ref 8–16)
AST SERPL-CCNC: 19 U/L (ref 10–40)
BILIRUB SERPL-MCNC: 0.5 MG/DL (ref 0.1–1)
BUN SERPL-MCNC: 13 MG/DL (ref 6–20)
CALCIUM SERPL-MCNC: 9.8 MG/DL (ref 8.7–10.5)
CHLORIDE SERPL-SCNC: 107 MMOL/L (ref 95–110)
CO2 SERPL-SCNC: 25 MMOL/L (ref 23–29)
CREAT SERPL-MCNC: 0.8 MG/DL (ref 0.5–1.4)
EST. GFR  (AFRICAN AMERICAN): >60 ML/MIN/1.73 M^2
EST. GFR  (NON AFRICAN AMERICAN): >60 ML/MIN/1.73 M^2
GLUCOSE SERPL-MCNC: 135 MG/DL (ref 70–110)
POTASSIUM SERPL-SCNC: 5.4 MMOL/L (ref 3.5–5.1)
PROT SERPL-MCNC: 6.9 G/DL (ref 6–8.4)
SODIUM SERPL-SCNC: 141 MMOL/L (ref 136–145)

## 2020-06-12 PROCEDURE — 83036 HEMOGLOBIN GLYCOSYLATED A1C: CPT

## 2020-06-12 PROCEDURE — 36415 COLL VENOUS BLD VENIPUNCTURE: CPT | Mod: PN

## 2020-06-12 PROCEDURE — 80053 COMPREHEN METABOLIC PANEL: CPT

## 2020-06-13 LAB
ESTIMATED AVG GLUCOSE: 143 MG/DL (ref 68–131)
HBA1C MFR BLD HPLC: 6.6 % (ref 4–5.6)

## 2020-06-17 ENCOUNTER — OFFICE VISIT (OUTPATIENT)
Dept: ENDOCRINOLOGY | Facility: CLINIC | Age: 25
End: 2020-06-17
Payer: COMMERCIAL

## 2020-06-17 VITALS — DIASTOLIC BLOOD PRESSURE: 80 MMHG | HEART RATE: 78 BPM | SYSTOLIC BLOOD PRESSURE: 100 MMHG

## 2020-06-17 DIAGNOSIS — E10.649 TYPE 1 DIABETES MELLITUS WITH HYPOGLYCEMIA AND WITHOUT COMA: ICD-10-CM

## 2020-06-17 DIAGNOSIS — Z96.41 INSULIN PUMP STATUS: ICD-10-CM

## 2020-06-17 PROCEDURE — 3044F PR MOST RECENT HEMOGLOBIN A1C LEVEL <7.0%: ICD-10-PCS | Mod: CPTII,S$GLB,, | Performed by: INTERNAL MEDICINE

## 2020-06-17 PROCEDURE — 99999 PR PBB SHADOW E&M-EST. PATIENT-LVL IV: ICD-10-PCS | Mod: PBBFAC,,,

## 2020-06-17 PROCEDURE — 95251 PR GLUCOSE MONITOR, 72 HOUR, PHYS INTERP: ICD-10-PCS | Mod: S$GLB,,, | Performed by: INTERNAL MEDICINE

## 2020-06-17 PROCEDURE — 99213 OFFICE O/P EST LOW 20 MIN: CPT | Mod: 25,S$GLB,, | Performed by: INTERNAL MEDICINE

## 2020-06-17 PROCEDURE — 99213 PR OFFICE/OUTPT VISIT, EST, LEVL III, 20-29 MIN: ICD-10-PCS | Mod: 25,S$GLB,, | Performed by: INTERNAL MEDICINE

## 2020-06-17 PROCEDURE — 99999 PR PBB SHADOW E&M-EST. PATIENT-LVL IV: CPT | Mod: PBBFAC,,,

## 2020-06-17 PROCEDURE — 95251 CONT GLUC MNTR ANALYSIS I&R: CPT | Mod: S$GLB,,, | Performed by: INTERNAL MEDICINE

## 2020-06-17 PROCEDURE — 3044F HG A1C LEVEL LT 7.0%: CPT | Mod: CPTII,S$GLB,, | Performed by: INTERNAL MEDICINE

## 2020-06-17 NOTE — ASSESSMENT & PLAN NOTE
Pt has type 1 DM with HbA1c of 6.6%  Reviewed pump data. Pt continues to have low BG levels during the day, while working. She tends to eat a snack before eat to avoid lows overnight as she is scared of becoming hypoglycemic.     -Advised pt to set temp basal while working. Start about 30 min before work and end about 30 min after work. Set it to 20% less than the current basal rate.   -Change ISF from 40 to 50  -Follow up in 3 months    -Pt reports she tried to get dexcom thru pharmacy benefits, but it was too expensive. Will work on obtaining dexcom thru [employer] medical supplies.

## 2020-06-17 NOTE — PROGRESS NOTES
Pump Clinic Return Visit    Type 1 DM     HPI:Tammie Gomez is a 25 y.o. female who was diagnosed with type 1 DM in 1999. Reports has not been interested in CGM due to wearing another device.     Current diabetes regimen:  Pump: Omnipod since 8th grade    Pump Settings  Basal Rate  12A: 1.0 U/h  8A:   0.85 U/h  5P:  1.1 U/h    Carb Ratio  12A:  1:8    ISF  12A: 1:40    Target: 115/115  IAT: 3.5h    TDD: 45.2 units   Basal 49%; Bolus 51%    Not using a CGM currently    Lab Results   Component Value Date    HGBA1C 6.6 (H) 06/12/2020       Glucose Monitoring:  Meter/CGM: No CGM, uses meter  Pump and meter report downloaded and reviewed, see report in media tab    Pt is monitoring blood glucose readings 4 times a day.  Needs >100 strips per month related to fluctuations with blood glucose reading, A1c trends, and activity level.    Hypoglycemic Episodes:  Yes, mostly at night after work (usually walks about 1 mile): around 10 PM. Usually symptomatic-- shaky, palpitations.  Since last visit no severe hypoglycemic episodes requiring observer intervention, has glucagon kit, parents know how to use it    Works at a restaurant.   Going to summer classes.     DKA: Never    Screening / DM Complications:  Neuropathy: No  Last foot exam : 12/11/2019  Last eye exam : 07/31/2019;  no laser surgery or DR  CVD/MI: No    Lab Results   Component Value Date    TSH 2.407 12/04/2019     Lab Results   Component Value Date    TTGIGA 5 08/10/2015       Diet/Exercise:  Only eats 2 meals lunch (coffee & muffin) around noon and dinner 9 pm, varies food.  Snacks: cheezit    Pregnancy plans: No    Review of Systems   Constitutional: Negative for unexpected weight change.   Eyes: Negative for visual disturbance.   Respiratory: Negative for shortness of breath.    Cardiovascular: Negative for chest pain.   Gastrointestinal: Negative for abdominal pain.   Musculoskeletal: Negative for arthralgias.   Skin: Negative for wound.  "  Allergic/Immunologic: Negative for food allergies.   Neurological: Negative for headaches.   Hematological: Does not bruise/bleed easily.       Vital Signs  /80 (BP Location: Left arm, Patient Position: Sitting, BP Method: Large (Manual))   Pulse 78   Resp (P) 16   Ht (P) 5' 2" (1.575 m)   Wt (P) 65.9 kg (145 lb 2.8 oz)   BMI (P) 26.55 kg/m²     Physical Exam  Constitutional:       Appearance: Normal appearance. She is well-developed.   HENT:      Head: Normocephalic and atraumatic.   Neck:      Musculoskeletal: Normal range of motion and neck supple.      Thyroid: No thyromegaly.   Cardiovascular:      Rate and Rhythm: Normal rate and regular rhythm.   Pulmonary:      Effort: Pulmonary effort is normal. No respiratory distress.   Abdominal:      Palpations: Abdomen is soft.      Tenderness: There is no abdominal tenderness.   Skin:     General: Skin is warm.      Findings: No rash.      Comments: No reaction at pump site   Neurological:      General: No focal deficit present.      Mental Status: She is alert and oriented to person, place, and time.   Psychiatric:         Mood and Affect: Mood normal.         Behavior: Behavior normal.         Protective Sensation (w/ 10 gram monofilament):  Right: Intact  Left: Intact    Visual Inspection:  Normal -  Bilateral        Chemistry        Component Value Date/Time     06/12/2020 0940    K 5.4 (H) 06/12/2020 0940     06/12/2020 0940    CO2 25 06/12/2020 0940    BUN 13 06/12/2020 0940    CREATININE 0.8 06/12/2020 0940     (H) 06/12/2020 0940        Component Value Date/Time    CALCIUM 9.8 06/12/2020 0940    ALKPHOS 50 (L) 06/12/2020 0940    AST 19 06/12/2020 0940    ALT 16 06/12/2020 0940    BILITOT 0.5 06/12/2020 0940    ESTGFRAFRICA >60.0 06/12/2020 0940    EGFRNONAA >60.0 06/12/2020 0940           Lab Results   Component Value Date    MICALBCREAT 2.7 11/02/2018     Lab Results   Component Value Date    CHOL 203 (H) 12/04/2019    CHOL 201 " (H) 11/02/2018    CHOL 182 07/18/2016     Lab Results   Component Value Date    HDL 77 (H) 12/04/2019    HDL 69 11/02/2018    HDL 50 07/18/2016     Lab Results   Component Value Date    LDLCALC 111.2 12/04/2019    LDLCALC 119.8 11/02/2018    LDLCALC 95.0 07/18/2016     Lab Results   Component Value Date    TRIG 74 12/04/2019    TRIG 61 11/02/2018    TRIG 185 (H) 07/18/2016     Lab Results   Component Value Date    CHOLHDL 37.9 12/04/2019    CHOLHDL 34.3 11/02/2018    CHOLHDL 27.5 07/18/2016           Assessment/Plan  Insulin pump status  On Omnipod  Pt would like to switch to Omnipod Dash    Type 1 diabetes mellitus with hypoglycemia and without coma  Pt has type 1 DM with HbA1c of 6.6%  Reviewed pump data. Pt continues to have low BG levels during the day, while working. She tends to eat a snack before eat to avoid lows overnight as she is scared of becoming hypoglycemic.     -Advised pt to set temp basal while working. Start about 30 min before work and end about 30 min after work. Set it to 20% less than the current basal rate.   -Change ISF from 40 to 50  -Follow up in 3 months    -Pt reports she tried to get dexcom thru pharmacy benefits, but it was too expensive. Will work on obtaining dexcom thru [employer].         Glucagon kit: yes, parents know how to use it    FOLLOW UP  Follow up in about 3 months (around 9/17/2020).       Pump backup plan  If the insulin pump is non functional and discontinued for anticipated more than 20 hours, please give daily injections of:  Long acting insulin 26 units daily  Short acting insulin for meals according to carb ratios and sensitivity factor in the pump.    When the insulin pump is restarted, do not restart basal rates until at least 22 hours after the last long acting insulin injection. You can set a 0% temporary basal setting that will last until this time and use your pump to bolus for meals and correction.    For any technical insulin pump issues, please  contact the insulin pump company; the toll free number is printed on the label on the back of the insulin pump.      Discussed with Dr. Calero

## 2020-06-17 NOTE — PATIENT INSTRUCTIONS
-Please set temp basal while working. Start about 30 min before work and end about 30 min after work. Set it to 20% less than the current basal rate.   -Change ISF to 50  -Follow up in 3 months    -Will work on obtaining dexcom thru [employer].

## 2020-06-19 NOTE — PROGRESS NOTES
I have reviewed and concur with Dr. Bahena's history, physical, assessment, and plan.  I have personally interviewed and examined the patient.  See below addendum for my evaluation and additional findings.    She has been having some hypoglycemia when active at work.  Will have her do temporary basal reduction of 20% about 30 min before and after starting her shift as she also walks to work.  Will make corrections less aggressive to prevent hypoglycemia.  Will work on getting dexcom.    Alexis Calero MD

## 2020-06-25 ENCOUNTER — PATIENT OUTREACH (OUTPATIENT)
Dept: ENDOCRINOLOGY | Facility: CLINIC | Age: 25
End: 2020-06-25

## 2020-06-25 NOTE — PROGRESS NOTES
PUMP CLINIC NOTE: Omnipod pump download reviewed with Dr. Roman. Verified that patient's reverse correction was on in her pump.  General recommendations discussed.

## 2020-07-01 ENCOUNTER — TELEPHONE (OUTPATIENT)
Dept: ENDOCRINOLOGY | Facility: CLINIC | Age: 25
End: 2020-07-01

## 2020-07-01 NOTE — TELEPHONE ENCOUNTER
----- Message from Hitesh Hawk MA sent at 7/1/2020 11:09 AM CDT -----  Regarding: FW: Dustin - INSULIN OMNIPODS    ----- Message -----  From: Raysa Sy  Sent: 7/1/2020  11:04 AM CDT  To: [facility] Staff  Subject: Dustin Keith INSULIN OMNIPLUIS                       Requesting a call back in regards to getting the fax for the medical necessities for the pt's supplies.     Contact Info 2  option 2

## 2020-07-02 ENCOUNTER — PATIENT MESSAGE (OUTPATIENT)
Dept: ENDOCRINOLOGY | Facility: CLINIC | Age: 25
End: 2020-07-02

## 2020-08-05 ENCOUNTER — PATIENT MESSAGE (OUTPATIENT)
Dept: ENDOCRINOLOGY | Facility: CLINIC | Age: 25
End: 2020-08-05

## 2020-08-05 DIAGNOSIS — E10.649 TYPE 1 DIABETES MELLITUS WITH HYPOGLYCEMIA AND WITHOUT COMA: ICD-10-CM

## 2020-08-05 RX ORDER — INSULIN LISPRO 100 [IU]/ML
INJECTION, SOLUTION INTRAVENOUS; SUBCUTANEOUS
Qty: 90 ML | Refills: 3 | Status: SHIPPED | OUTPATIENT
Start: 2020-08-05 | End: 2020-09-27 | Stop reason: SDUPTHER

## 2020-09-02 ENCOUNTER — OFFICE VISIT (OUTPATIENT)
Dept: OPTOMETRY | Facility: CLINIC | Age: 25
End: 2020-09-02
Payer: COMMERCIAL

## 2020-09-02 DIAGNOSIS — E10.9 TYPE 1 DIABETES MELLITUS WITHOUT COMPLICATION: Primary | ICD-10-CM

## 2020-09-02 PROCEDURE — 99999 PR PBB SHADOW E&M-EST. PATIENT-LVL III: CPT | Mod: PBBFAC,,, | Performed by: OPTOMETRIST

## 2020-09-02 PROCEDURE — 92014 PR EYE EXAM, EST PATIENT,COMPREHESV: ICD-10-PCS | Mod: S$GLB,,, | Performed by: OPTOMETRIST

## 2020-09-02 PROCEDURE — 99999 PR PBB SHADOW E&M-EST. PATIENT-LVL III: ICD-10-PCS | Mod: PBBFAC,,, | Performed by: OPTOMETRIST

## 2020-09-02 PROCEDURE — 92015 DETERMINE REFRACTIVE STATE: CPT | Mod: S$GLB,,, | Performed by: OPTOMETRIST

## 2020-09-02 PROCEDURE — 92015 PR REFRACTION: ICD-10-PCS | Mod: S$GLB,,, | Performed by: OPTOMETRIST

## 2020-09-02 PROCEDURE — 92014 COMPRE OPH EXAM EST PT 1/>: CPT | Mod: S$GLB,,, | Performed by: OPTOMETRIST

## 2020-09-02 NOTE — PROGRESS NOTES
HPI     Tammie Gomez is a 25 y.o. female who is brought in by her father, Geovany,    for continued diabetic eye care.  Tammie has type 1 DM.  She was   diagnosed around age 5.  It is controlled with an insulin pump and CGM.   Her last exam with me was on 7/31/19. Her blood sugar this morning was   346.   Today, she reports that she has not noticed any new or concerning   ocular or visual symptoms.    Hemoglobin A1C       Date                     Value               Ref Range             Status                06/12/2020               6.6 (H)             4.0 - 5.6 %           Final              (--)blurred vision  (--)Headaches  (--)diplopia  (--)flashes  (--)floaters  (--)pain  (--)Itching  (--)tearing  (--)burning  (--)Dryness  (--) OTC Drops  (--)Photophobia      Last edited by Peter Ocasio, OD on 9/2/2020  9:55 AM. (History)        Review of Systems   Constitutional: Negative for chills, fever and malaise/fatigue.   HENT: Negative for congestion and hearing loss.    Eyes: Negative for blurred vision, double vision, photophobia, pain, discharge and redness.   Respiratory: Negative.    Cardiovascular: Negative.    Gastrointestinal: Negative.    Genitourinary: Negative.    Musculoskeletal: Negative.    Skin: Negative.    Neurological: Negative for seizures.   Endo/Heme/Allergies: Negative for environmental allergies.   Psychiatric/Behavioral: Negative.        For exam results, see encounter report    Assessment /Plan     1. Type 1 diabetes mellitus without complication  - No ocular  treatment needed; monitor annually      2. Clinical emmetropia  - no treatment needed at this time      Parent & Patient education; RTC in 1 year with DFE; Ok to instill 0.5% Tropicamide after (normal) baseline workup, sooner as needed at [facility]

## 2020-09-17 ENCOUNTER — LAB VISIT (OUTPATIENT)
Dept: LAB | Facility: HOSPITAL | Age: 25
End: 2020-09-17
Payer: COMMERCIAL

## 2020-09-17 ENCOUNTER — OFFICE VISIT (OUTPATIENT)
Dept: ENDOCRINOLOGY | Facility: CLINIC | Age: 25
End: 2020-09-17
Payer: COMMERCIAL

## 2020-09-17 VITALS
DIASTOLIC BLOOD PRESSURE: 70 MMHG | HEIGHT: 62 IN | WEIGHT: 146.5 LBS | BODY MASS INDEX: 26.96 KG/M2 | SYSTOLIC BLOOD PRESSURE: 100 MMHG

## 2020-09-17 DIAGNOSIS — E10.649 TYPE 1 DIABETES MELLITUS WITH HYPOGLYCEMIA AND WITHOUT COMA: ICD-10-CM

## 2020-09-17 DIAGNOSIS — E10.649 TYPE 1 DIABETES MELLITUS WITH HYPOGLYCEMIA AND WITHOUT COMA: Primary | ICD-10-CM

## 2020-09-17 DIAGNOSIS — Z96.41 INSULIN PUMP STATUS: ICD-10-CM

## 2020-09-17 DIAGNOSIS — E10.649 HYPOGLYCEMIA DUE TO TYPE 1 DIABETES MELLITUS: ICD-10-CM

## 2020-09-17 LAB
CHOLEST SERPL-MCNC: 195 MG/DL (ref 120–199)
CHOLEST/HDLC SERPL: 3.4 {RATIO} (ref 2–5)
ESTIMATED AVG GLUCOSE: 146 MG/DL (ref 68–131)
HBA1C MFR BLD HPLC: 6.7 % (ref 4–5.6)
HDLC SERPL-MCNC: 58 MG/DL (ref 40–75)
HDLC SERPL: 29.7 % (ref 20–50)
LDLC SERPL CALC-MCNC: 125.4 MG/DL (ref 63–159)
NONHDLC SERPL-MCNC: 137 MG/DL
TRIGL SERPL-MCNC: 58 MG/DL (ref 30–150)
TSH SERPL DL<=0.005 MIU/L-ACNC: 0.72 UIU/ML (ref 0.4–4)

## 2020-09-17 PROCEDURE — 3008F PR BODY MASS INDEX (BMI) DOCUMENTED: ICD-10-PCS | Mod: CPTII,S$GLB,, | Performed by: INTERNAL MEDICINE

## 2020-09-17 PROCEDURE — 3044F HG A1C LEVEL LT 7.0%: CPT | Mod: CPTII,S$GLB,, | Performed by: INTERNAL MEDICINE

## 2020-09-17 PROCEDURE — 3008F BODY MASS INDEX DOCD: CPT | Mod: CPTII,S$GLB,, | Performed by: INTERNAL MEDICINE

## 2020-09-17 PROCEDURE — 3044F PR MOST RECENT HEMOGLOBIN A1C LEVEL <7.0%: ICD-10-PCS | Mod: CPTII,S$GLB,, | Performed by: INTERNAL MEDICINE

## 2020-09-17 PROCEDURE — 84443 ASSAY THYROID STIM HORMONE: CPT

## 2020-09-17 PROCEDURE — 36415 COLL VENOUS BLD VENIPUNCTURE: CPT

## 2020-09-17 PROCEDURE — 83516 IMMUNOASSAY NONANTIBODY: CPT

## 2020-09-17 PROCEDURE — 99999 PR PBB SHADOW E&M-EST. PATIENT-LVL III: ICD-10-PCS | Mod: PBBFAC,,,

## 2020-09-17 PROCEDURE — 80061 LIPID PANEL: CPT

## 2020-09-17 PROCEDURE — 83036 HEMOGLOBIN GLYCOSYLATED A1C: CPT

## 2020-09-17 PROCEDURE — 99214 PR OFFICE/OUTPT VISIT, EST, LEVL IV, 30-39 MIN: ICD-10-PCS | Mod: S$GLB,,, | Performed by: INTERNAL MEDICINE

## 2020-09-17 PROCEDURE — 99999 PR PBB SHADOW E&M-EST. PATIENT-LVL III: CPT | Mod: PBBFAC,,,

## 2020-09-17 PROCEDURE — 99214 OFFICE O/P EST MOD 30 MIN: CPT | Mod: S$GLB,,, | Performed by: INTERNAL MEDICINE

## 2020-09-17 NOTE — PROGRESS NOTES
[facility]ump [facility] Return Visit    Type 1 DM     HPI:Tammie Gomez is a 25 y.o. female who was diagnosed with type 1 DM in 1999.    Interval history:  Could not get Dexcom covered through pharmacy benefits. Reports too expensive even through DMS.  Reports has used temp basal for work at times with less hypoglycemia  Has lows often, mostly in the late evening, morning. Feels them even at low 70s.  Reports having occasional skin reactions to pods      Current diabetes regimen:  Pump: Omnipod since 8th grade    Pump Settings  Basal Rate  12A: 1.0 U/h  8A:   0.85 U/h  5P:  1.1 U/h    Carb Ratio  12A:  1:8    ISF  12A: 1:50    Target: 115/115  IAT: 3.5h    TDD: 42.2 units   Basal 53%; Bolus 47%    Not using a CGM currently    Lab Results   Component Value Date    HGBA1C 6.6 (H) 06/12/2020       Glucose Monitoring:  Meter/CGM: No CGM, uses meter  Pump and meter report downloaded and reviewed, see report in media tab    Pt is monitoring blood glucose readings 4 times a day.  Needs >100 strips per month related to fluctuations with blood glucose reading, A1c trends, and activity level.    Hypoglycemic Episodes:  Yes, mostly at night after work and early morning  Since last visit no severe hypoglycemic episodes requiring observer intervention, has glucagon kit, parents know how to use it    Works at a restaurant.   Going to summer classes.     DKA: Never    Screening / DM Complications:  Neuropathy: No  Last foot exam : 06/17/2020  Last eye exam : 09/02/2020;  no laser surgery or DR  CVD/MI: No    Lab Results   Component Value Date    TSH 2.407 12/04/2019     Lab Results   Component Value Date    TTGIGA 5 08/10/2015       Diet/Exercise:  Only eats 2 meals lunch (coffee & muffin) around noon and dinner 9 pm, varies food.  Snacks: cheezit    Pregnancy plans: No    Diabetes Management Status    Statin: Not taking  ACE/ARB: Not taking    Screening or Prevention Patient's value Goal Complete/Controlled?   HgA1C Testing and  "Control   Lab Results   Component Value Date    HGBA1C 6.6 (H) 06/12/2020      Annually/Less than 8% Yes   Lipid profile : 12/04/2019 Annually Yes   LDL control Lab Results   Component Value Date    LDLCALC 111.2 12/04/2019    Annually/Less than 100 mg/dl  No   Nephropathy screening Lab Results   Component Value Date    LABMICR 4.0 11/02/2018     No results found for: PROTEINUA Annually No   Blood pressure BP Readings from Last 1 Encounters:   09/17/20 100/70    Less than 140/90 Yes   Dilated retinal exam : 09/02/2020 Annually Yes   Foot exam   : 06/17/2020 Annually Yes         Review of Systems   Constitutional: Negative for unexpected weight change.   Eyes: Negative for visual disturbance.   Respiratory: Negative for shortness of breath.    Cardiovascular: Negative for chest pain.   Gastrointestinal: Negative for abdominal pain.   Musculoskeletal: Negative for arthralgias.   Skin: Negative for wound.   Allergic/Immunologic: Negative for food allergies.   Neurological: Negative for headaches.   Hematological: Does not bruise/bleed easily.       Vital Signs  /70   Ht 5' 2" (1.575 m)   Wt 66.4 kg (146 lb 7.9 oz)   BMI 26.79 kg/m²     Physical Exam  Constitutional:       Appearance: Normal appearance. She is well-developed.   HENT:      Head: Normocephalic and atraumatic.   Neck:      Musculoskeletal: Normal range of motion and neck supple.      Thyroid: No thyromegaly.   Cardiovascular:      Rate and Rhythm: Normal rate and regular rhythm.   Pulmonary:      Effort: Pulmonary effort is normal. No respiratory distress.   Abdominal:      Palpations: Abdomen is soft.      Tenderness: There is no abdominal tenderness.   Skin:     General: Skin is warm.      Findings: No rash.      Comments: Noted area of dryness and resolving inflammation in back of right arm in site of recent pod insertion   Neurological:      General: No focal deficit present.      Mental Status: She is alert and oriented to person, place, and " time.   Psychiatric:         Mood and Affect: Mood normal.         Behavior: Behavior normal.             Chemistry        Component Value Date/Time     06/12/2020 0940    K 5.4 (H) 06/12/2020 0940     06/12/2020 0940    CO2 25 06/12/2020 0940    BUN 13 06/12/2020 0940    CREATININE 0.8 06/12/2020 0940     (H) 06/12/2020 0940        Component Value Date/Time    CALCIUM 9.8 06/12/2020 0940    ALKPHOS 50 (L) 06/12/2020 0940    AST 19 06/12/2020 0940    ALT 16 06/12/2020 0940    BILITOT 0.5 06/12/2020 0940    ESTGFRAFRICA >60.0 06/12/2020 0940    EGFRNONAA >60.0 06/12/2020 0940           Lab Results   Component Value Date    MICALBCREAT 2.7 11/02/2018     Lab Results   Component Value Date    CHOL 203 (H) 12/04/2019    CHOL 201 (H) 11/02/2018    CHOL 182 07/18/2016     Lab Results   Component Value Date    HDL 77 (H) 12/04/2019    HDL 69 11/02/2018    HDL 50 07/18/2016     Lab Results   Component Value Date    LDLCALC 111.2 12/04/2019    LDLCALC 119.8 11/02/2018    LDLCALC 95.0 07/18/2016     Lab Results   Component Value Date    TRIG 74 12/04/2019    TRIG 61 11/02/2018    TRIG 185 (H) 07/18/2016     Lab Results   Component Value Date    CHOLHDL 37.9 12/04/2019    CHOLHDL 34.3 11/02/2018    CHOLHDL 27.5 07/18/2016           Assessment/Plan  Type 1 diabetes mellitus with hypoglycemia and without coma  Type 1 DM with HbA1c of 6.6% however with signicant glycemic variability. She experiences hypoglycemia multiples times per week mostly in late evening and early related to mealtime boluses. Will adjust ICR and decrease basal insulin during that time frame.    She is still having some hyperglycemic excursions which may be related to inaccurate carb counting as they occur with large meals mostly. Believe first step is to correct hypoglycemia and then focus on correcting hypoglycemia.    Greatly benefits from CGM with alerts given glycemic variability with hypoglycemia however not covered by insurance and  too expensive through pharmacy benefits. Will do a trial of FreeStyle Oj 2 which has alarms.    Will review CGM data after 4 weeks of use and adjust further insulin settings as needed.    Plan  - Continue Omnipod insulin pump with following changes:  Basal Rate  12A: 0.9 U/h  8A:   0.85 U/h  5P:   1.1 U/h    Carb Ratio  12A:  1:10  5A:    1:8  8P:    1:10    ISF  12A: 1:50    Target: 115/115    IAT: 3.5h      - Will do a trial FreeStyle Oj 2  - Encouraged use of temp basal while working  - Due for A1c, lipids, TSH, celiac screen, UACR  - Up to date with eye and foot exams  - Follow up in 3 months    Insulin pump status  She has had occasional pump site reactions  Instructed on using OTC Flonase     Hypoglycemia due to type 1 diabetes mellitus  Know how to correct  Will adjust insulin pump settings as above        Glucagon kit: yes, parents know how to use it    FOLLOW UP  Follow up in about 3 months (around 12/17/2020).       Pump backup plan  If the insulin pump is non functional and discontinued for anticipated more than 20 hours, please give daily injections of:  Long acting insulin 26 units daily  Short acting insulin for meals according to carb ratios and sensitivity factor in the pump.    When the insulin pump is restarted, do not restart basal rates until at least 22 hours after the last long acting insulin injection. You can set a 0% temporary basal setting that will last until this time and use your pump to bolus for meals and correction.    For any technical insulin pump issues, please contact the insulin pump company; the toll free number is printed on the label on the back of the insulin pump.      Anders Cano MD  Endocrinology Fellow

## 2020-09-17 NOTE — ASSESSMENT & PLAN NOTE
Type 1 DM with HbA1c of 6.6% however with signicant glycemic variability. She experiences hypoglycemia multiples times per week mostly in late evening and early related to mealtime boluses. Will adjust ICR and decrease basal insulin during that time frame.    She is still having some hyperglycemic excursions which may be related to inaccurate carb counting as they occur with large meals mostly. Believe first step is to correct hypoglycemia and then focus on correcting hypoglycemia.    Greatly benefits from CGM with alerts given glycemic variability with hypoglycemia however not covered by insurance and too expensive through pharmacy benefits. Will do a trial of FreeStyle Oj 2 which has alarms.    Will review CGM data after 4 weeks of use and adjust further insulin settings as needed.    Plan  - Continue Omnipod insulin pump with following changes:  Basal Rate  12A: 0.9 U/h  8A:   0.85 U/h  5P:   1.1 U/h    Carb Ratio  12A:  1:10  5A:    1:8  8P:    1:10    ISF  12A: 1:50    Target: 115/115    IAT: 3.5h      - Will do a trial FreeStyle Oj 2  - Encouraged use of temp basal while working  - Due for A1c, lipids, TSH, celiac screen, UACR  - Up to date with eye and foot exams  - Follow up in 3 months

## 2020-09-18 ENCOUNTER — PATIENT MESSAGE (OUTPATIENT)
Dept: ENDOCRINOLOGY | Facility: CLINIC | Age: 25
End: 2020-09-18

## 2020-09-20 NOTE — PROGRESS NOTES
I have reviewed and concur with Dr. Oconnell's history, physical, assessment, and plan.  I have personally interviewed and examined the patient.    Pump download reviewed and significant for both hyper and hypoglycemia. She would benefit from use of personal CGM as on MDI with hyper/hypoglycemia. She understands and is willing to use technology and will have visits every 6 months. Unable to afford Dexcom so will plan for Libre2 with alarms    Giana Roman MD

## 2020-09-21 LAB — TTG IGA SER-ACNC: 4 UNITS

## 2020-09-27 ENCOUNTER — PATIENT MESSAGE (OUTPATIENT)
Dept: ENDOCRINOLOGY | Facility: CLINIC | Age: 25
End: 2020-09-27

## 2020-09-28 ENCOUNTER — TELEPHONE (OUTPATIENT)
Dept: ENDOCRINOLOGY | Facility: CLINIC | Age: 25
End: 2020-09-28

## 2020-09-28 NOTE — TELEPHONE ENCOUNTER
Spoke to pharmacy and they need to know which omnipod do you want to order? One is a dash one is a starter kit and one is an arrows , what omnipod do you want to order for the patient ?

## 2020-09-28 NOTE — TELEPHONE ENCOUNTER
----- Message from Sadia Ceballos sent at 9/28/2020  1:09 PM CDT -----  Regarding: refill  Contact: [facility]@ [phone]  [facility] pharmacy calling to get clarification on patient's prescriptions. Please call.

## 2020-10-02 ENCOUNTER — TELEPHONE (OUTPATIENT)
Dept: ENDOCRINOLOGY | Facility: CLINIC | Age: 25
End: 2020-10-02

## 2020-10-20 ENCOUNTER — PATIENT MESSAGE (OUTPATIENT)
Dept: ENDOCRINOLOGY | Facility: CLINIC | Age: 25
End: 2020-10-20

## 2020-10-20 RX ORDER — FLASH GLUCOSE SENSOR
1 KIT MISCELLANEOUS
Qty: 2 KIT | Refills: 11 | Status: SHIPPED | OUTPATIENT
Start: 2020-10-20 | End: 2021-11-28 | Stop reason: SDUPTHER

## 2020-10-21 ENCOUNTER — PATIENT MESSAGE (OUTPATIENT)
Dept: ENDOCRINOLOGY | Facility: CLINIC | Age: 25
End: 2020-10-21

## 2020-10-30 NOTE — PROGRESS NOTES
HPI     Tammie Gomez is a 22 y.o. Female who is brought in by her father, Geovany,    for continued eye care.   She reports that she noticed that her distance vision is sometimes blurry   but she only noticed that sometimes and when she has to see very detailed   ( numbers) in a far distance.   She has her diabetes very much under control with her digital glucometer   and the insulin pump. Her most recent BS measurement was 183 2 hours ago.  Hemoglobin A1C       Date                     Value               Ref Range             Status                05/15/2017               8.7 (H)             4.5 - 6.2 %           Final              (+)blurred vision  (--)Headaches  (--)diplopia  (--)flashes  (--)floaters  (--)pain  (--)Itching  (--)tearing  (--)burning  (--)Dryness  (--) OTC Drops  (--)Photophobia    Last edited by Peter Ocasio, OD on 8/4/2017  4:28 PM. (History)        ROS     Positive for: Endocrine (TYPE 1 DM)    Negative for: Constitutional, Gastrointestinal, Neurological, Skin,   Genitourinary, Musculoskeletal, HENT, Cardiovascular, Eyes, Respiratory,   Psychiatric, Allergic/Imm, Heme/Lymph    Last edited by Peter Ocasio, OD on 8/4/2017  4:28 PM. (History)        Assessment /Plan     For exam results, see Encounter Report.    1. Type 1 diabetes mellitus without retinopathy  - No ocular  treatment needed; monitor annually      2. Minimal myopia --> symptomatic at night  - Spec Rx per final Rx below for distance only  Glasses Prescription (8/4/2017)        Sphere Cylinder Dist [facility]    Right -0.25 Sphere 20/20    Left -0.25 Sphere 20/20    Type:  SVL    Expiration Date:  8/5/2018            Parent and Patient education; RTC in 1 year with DFE, sooner prn at [facility]                   
210.1

## 2020-12-17 ENCOUNTER — LAB VISIT (OUTPATIENT)
Dept: LAB | Facility: HOSPITAL | Age: 25
End: 2020-12-17
Payer: COMMERCIAL

## 2020-12-17 ENCOUNTER — OFFICE VISIT (OUTPATIENT)
Dept: ENDOCRINOLOGY | Facility: CLINIC | Age: 25
End: 2020-12-17
Payer: COMMERCIAL

## 2020-12-17 ENCOUNTER — PATIENT MESSAGE (OUTPATIENT)
Dept: ENDOCRINOLOGY | Facility: CLINIC | Age: 25
End: 2020-12-17

## 2020-12-17 VITALS
DIASTOLIC BLOOD PRESSURE: 78 MMHG | RESPIRATION RATE: 18 BRPM | BODY MASS INDEX: 27.18 KG/M2 | HEIGHT: 62 IN | OXYGEN SATURATION: 98 % | HEART RATE: 114 BPM | WEIGHT: 147.69 LBS | SYSTOLIC BLOOD PRESSURE: 114 MMHG

## 2020-12-17 DIAGNOSIS — Z96.41 INSULIN PUMP STATUS: ICD-10-CM

## 2020-12-17 DIAGNOSIS — E10.649 TYPE 1 DIABETES MELLITUS WITH HYPOGLYCEMIA AND WITHOUT COMA: ICD-10-CM

## 2020-12-17 DIAGNOSIS — E10.649 HYPOGLYCEMIA DUE TO TYPE 1 DIABETES MELLITUS: ICD-10-CM

## 2020-12-17 DIAGNOSIS — E10.649 TYPE 1 DIABETES MELLITUS WITH HYPOGLYCEMIA AND WITHOUT COMA: Primary | ICD-10-CM

## 2020-12-17 LAB
ALBUMIN SERPL BCP-MCNC: 3.9 G/DL (ref 3.5–5.2)
ALP SERPL-CCNC: 54 U/L (ref 55–135)
ALT SERPL W/O P-5'-P-CCNC: 18 U/L (ref 10–44)
ANION GAP SERPL CALC-SCNC: 12 MMOL/L (ref 8–16)
AST SERPL-CCNC: 18 U/L (ref 10–40)
BILIRUB SERPL-MCNC: 0.6 MG/DL (ref 0.1–1)
BUN SERPL-MCNC: 15 MG/DL (ref 6–20)
CALCIUM SERPL-MCNC: 9.3 MG/DL (ref 8.7–10.5)
CHLORIDE SERPL-SCNC: 103 MMOL/L (ref 95–110)
CO2 SERPL-SCNC: 24 MMOL/L (ref 23–29)
CREAT SERPL-MCNC: 0.9 MG/DL (ref 0.5–1.4)
EST. GFR  (AFRICAN AMERICAN): >60 ML/MIN/1.73 M^2
EST. GFR  (NON AFRICAN AMERICAN): >60 ML/MIN/1.73 M^2
ESTIMATED AVG GLUCOSE: 134 MG/DL (ref 68–131)
GLUCOSE SERPL-MCNC: 285 MG/DL (ref 70–110)
HBA1C MFR BLD HPLC: 6.3 % (ref 4–5.6)
POTASSIUM SERPL-SCNC: 4.9 MMOL/L (ref 3.5–5.1)
PROT SERPL-MCNC: 7 G/DL (ref 6–8.4)
SODIUM SERPL-SCNC: 139 MMOL/L (ref 136–145)

## 2020-12-17 PROCEDURE — 80053 COMPREHEN METABOLIC PANEL: CPT

## 2020-12-17 PROCEDURE — 99214 PR OFFICE/OUTPT VISIT, EST, LEVL IV, 30-39 MIN: ICD-10-PCS | Mod: 25,S$GLB,, | Performed by: NURSE PRACTITIONER

## 2020-12-17 PROCEDURE — 99214 OFFICE O/P EST MOD 30 MIN: CPT | Mod: 25,S$GLB,, | Performed by: NURSE PRACTITIONER

## 2020-12-17 PROCEDURE — 36415 COLL VENOUS BLD VENIPUNCTURE: CPT

## 2020-12-17 PROCEDURE — 3044F PR MOST RECENT HEMOGLOBIN A1C LEVEL <7.0%: ICD-10-PCS | Mod: CPTII,S$GLB,, | Performed by: NURSE PRACTITIONER

## 2020-12-17 PROCEDURE — 83036 HEMOGLOBIN GLYCOSYLATED A1C: CPT

## 2020-12-17 PROCEDURE — 99999 PR PBB SHADOW E&M-EST. PATIENT-LVL V: CPT | Mod: PBBFAC,,, | Performed by: NURSE PRACTITIONER

## 2020-12-17 PROCEDURE — 95251 PR GLUCOSE MONITOR, 72 HOUR, PHYS INTERP: ICD-10-PCS | Mod: S$GLB,,, | Performed by: NURSE PRACTITIONER

## 2020-12-17 PROCEDURE — 1126F AMNT PAIN NOTED NONE PRSNT: CPT | Mod: S$GLB,,, | Performed by: NURSE PRACTITIONER

## 2020-12-17 PROCEDURE — 95251 CONT GLUC MNTR ANALYSIS I&R: CPT | Mod: S$GLB,,, | Performed by: NURSE PRACTITIONER

## 2020-12-17 PROCEDURE — 99999 PR PBB SHADOW E&M-EST. PATIENT-LVL V: ICD-10-PCS | Mod: PBBFAC,,, | Performed by: NURSE PRACTITIONER

## 2020-12-17 PROCEDURE — 3044F HG A1C LEVEL LT 7.0%: CPT | Mod: CPTII,S$GLB,, | Performed by: NURSE PRACTITIONER

## 2020-12-17 PROCEDURE — 1126F PR PAIN SEVERITY QUANTIFIED, NO PAIN PRESENT: ICD-10-PCS | Mod: S$GLB,,, | Performed by: NURSE PRACTITIONER

## 2020-12-17 PROCEDURE — 3008F PR BODY MASS INDEX (BMI) DOCUMENTED: ICD-10-PCS | Mod: CPTII,S$GLB,, | Performed by: NURSE PRACTITIONER

## 2020-12-17 PROCEDURE — 3008F BODY MASS INDEX DOCD: CPT | Mod: CPTII,S$GLB,, | Performed by: NURSE PRACTITIONER

## 2020-12-17 RX ORDER — PEN NEEDLE, DIABETIC 31 GX5/16"
NEEDLE, DISPOSABLE MISCELLANEOUS
Qty: 100 EACH | Refills: 3 | Status: SHIPPED | OUTPATIENT
Start: 2020-12-17

## 2020-12-17 RX ORDER — GLUCAGON INJECTION, SOLUTION 1 MG/.2ML
1 INJECTION, SOLUTION SUBCUTANEOUS
Qty: 2 SYRINGE | Refills: 0 | Status: SHIPPED | OUTPATIENT
Start: 2020-12-17 | End: 2022-01-15 | Stop reason: SDUPTHER

## 2020-12-17 RX ORDER — INSULIN LISPRO 100 [IU]/ML
INJECTION, SOLUTION INTRAVENOUS; SUBCUTANEOUS
Qty: 90 ML | Refills: 3 | Status: SHIPPED | OUTPATIENT
Start: 2020-12-17 | End: 2022-02-24 | Stop reason: SDUPTHER

## 2020-12-17 NOTE — ASSESSMENT & PLAN NOTE
Type 1 DM with HbA1c of 6.7% however with signicant glycemic variability. She experiences hypoglycemia multiples times per week mostly in early morning and midmorning. Some postprandial hyperglycemia after dinner and she needs to correct around midnight. She has gaps in transmission as she is only scanning when she feels low or prior to some meals. Encouraged to scan oj at least every 8 hours.     She is still having some hyperglycemic excursions which may be related to inaccurate carb counting as they occur with large meals mostly. Believe first step is to correct hypoglycemia and then focus on correcting hyperglycemia.    Greatly benefits from CGM with alerts given glycemic variability with hypoglycemia will continue FreeStyle Oj 2 which has alarms.     Will review CGM data after 4 weeks of use and adjust further insulin settings as needed.    Plan  - Continue Omnipod insulin pump with following changes:  Basal Rate  12A: 0.85 U/h  8A:   0.80 U/h  5P:   1.1 U/h    Carb Ratio  12A:  1:10  5A:    1:8  8P:    1:8    ISF  12A: 1:50    Target: 115/115    IAT: 3.5h    - Encouraged use of temp basal while working  - Due for A1c CMP  - Up to date with eye and foot exams  - Follow up in 3 months  - Encouraged to purchase ketone strips

## 2020-12-17 NOTE — PROGRESS NOTES
Pump Clinic Return Visit    Type 1 DM     HPI:Tammie Gomez is a 25 y.o. female who was diagnosed with type 1 DM in 1999.    Interval history:  Could not get Dexcom covered through pharmacy benefits. Reports too expensive even through DMS. She now has freestyle eddi 2    Reports has used temp basal for work at times with less hypoglycemia  Has lows often, mostly in the late early to morning. Feels them even at low 70s.  Reports having occasional skin reactions to pod--has tried flonase and helped    Current diabetes regimen:  Pump: Omnipod since 8th grade    Pump Settings  Basal Rate  12A: 0.9 U/h  8A:   0.85 U/h  5P:  1.1 U/h    Carb Ratio  12A:  1:10  5A: 1:8  8P: 1:10    ISF  12A: 1:50    Target: 115/115  IAT: 3.5h    TDD: 34.9 units   Basal 62; Bolus 38%    Now using eddi 2.    Lab Results   Component Value Date    HGBA1C 6.7 (H) 09/17/2020     Glucose Monitoring:  Meter/CGM: CGM freestyle  Pump and meter report downloaded and reviewed, see report in media tab    Pt is monitoring blood glucose readings 4 times a day.  Needs >100 strips per month related to fluctuations with blood glucose reading, A1c trends, and activity level.    Hypoglycemic Episodes:  Yes, mostly in early to mid morning. Some late prior to dinner. States she is only scanning when her BG is low. We have some gaps in data transmission.  Since last visit no severe hypoglycemic episodes requiring observer intervention, has glucagon kit, parents know how to use it  Has glucose tabs.   Needs ketone strips    Works at a restaurant. Varying work schedule.  She is going back to school for biology-wants to be an endo NP.     DKA: Never    Screening / DM Complications:  Neuropathy: No  Last foot exam : 06/17/2020  Last eye exam : 09/02/2020;  no laser surgery or DR  CVD/MI: No    Lab Results   Component Value Date    TSH 0.721 09/17/2020     Lab Results   Component Value Date    TTGIGA 4 09/17/2020       Diet/Exercise:  Only eats 2 meals lunch  "(coffee & muffin) around noon and dinner 9 pm, varies food.  Snacks: cheezit  Social alcohol-2 or 3 drinks    Pregnancy plans: No; on OCP    Diabetes Management Status    Statin: Not taking  ACE/ARB: Not taking    Screening or Prevention Patient's value Goal Complete/Controlled?   HgA1C Testing and Control   Lab Results   Component Value Date    HGBA1C 6.7 (H) 09/17/2020      Annually/Less than 8% Yes   Lipid profile : 09/17/2020 Annually Yes   LDL control Lab Results   Component Value Date    LDLCALC 125.4 09/17/2020    Annually/Less than 100 mg/dl  No   Nephropathy screening Lab Results   Component Value Date    LABMICR 33.0 09/17/2020     No results found for: PROTEINUA Annually No   Blood pressure BP Readings from Last 1 Encounters:   12/17/20 114/78    Less than 140/90 Yes   Dilated retinal exam : 09/02/2020 Annually Yes   Foot exam   : 06/17/2020 Annually Yes       Review of Systems   Constitutional: Negative for unexpected weight change.   Eyes: Negative for visual disturbance.   Respiratory: Negative for shortness of breath.    Cardiovascular: Negative for chest pain.   Gastrointestinal: Negative for abdominal pain.   Musculoskeletal: Negative for arthralgias.   Skin: Negative for wound.   Allergic/Immunologic: Negative for food allergies.   Neurological: Negative for headaches.   Hematological: Does not bruise/bleed easily.       Vital Signs  /78   Pulse (!) 114   Resp 18   Ht 5' 2" (1.575 m)   Wt 67 kg (147 lb 11.3 oz)   LMP 12/15/2020   SpO2 98%   BMI 27.02 kg/m²     Physical Exam  Vitals signs reviewed.   Constitutional:       Appearance: She is well-developed.   Neck:      Thyroid: No thyromegaly.   Cardiovascular:      Rate and Rhythm: Normal rate.   Pulmonary:      Effort: Pulmonary effort is normal.   Abdominal:      Palpations: Abdomen is soft.     DM foot exam deferred; done in June 2020  CGM and pump site are without edema or erythema      Chemistry        Component Value Date/Time    "  06/12/2020 0940    K 5.4 (H) 06/12/2020 0940     06/12/2020 0940    CO2 25 06/12/2020 0940    BUN 13 06/12/2020 0940    CREATININE 0.8 06/12/2020 0940     (H) 06/12/2020 0940        Component Value Date/Time    CALCIUM 9.8 06/12/2020 0940    ALKPHOS 50 (L) 06/12/2020 0940    AST 19 06/12/2020 0940    ALT 16 06/12/2020 0940    BILITOT 0.5 06/12/2020 0940    ESTGFRAFRICA >60.0 06/12/2020 0940    EGFRNONAA >60.0 06/12/2020 0940           Lab Results   Component Value Date    MICALBCREAT 12.3 09/17/2020     Lab Results   Component Value Date    CHOL 195 09/17/2020    CHOL 203 (H) 12/04/2019    CHOL 201 (H) 11/02/2018     Lab Results   Component Value Date    HDL 58 09/17/2020    HDL 77 (H) 12/04/2019    HDL 69 11/02/2018     Lab Results   Component Value Date    LDLCALC 125.4 09/17/2020    LDLCALC 111.2 12/04/2019    LDLCALC 119.8 11/02/2018     Lab Results   Component Value Date    TRIG 58 09/17/2020    TRIG 74 12/04/2019    TRIG 61 11/02/2018     Lab Results   Component Value Date    CHOLHDL 29.7 09/17/2020    CHOLHDL 37.9 12/04/2019    CHOLHDL 34.3 11/02/2018       Assessment/Plan  Type 1 diabetes mellitus with hypoglycemia and without coma  Type 1 DM with HbA1c of 6.7% however with signicant glycemic variability. She experiences hypoglycemia multiples times per week mostly in early morning and midmorning. Some postprandial hyperglycemia after dinner and she needs to correct around midnight. She has gaps in transmission as she is only scanning when she feels low or prior to some meals. Encouraged to scan oj at least every 8 hours.     She is still having some hyperglycemic excursions which may be related to inaccurate carb counting as they occur with large meals mostly. Believe first step is to correct hypoglycemia and then focus on correcting hyperglycemia.    Greatly benefits from CGM with alerts given glycemic variability with hypoglycemia will continue FreeStyle Oj 2 which has alarms.      Will review CGM data after 4 weeks of use and adjust further insulin settings as needed.    Plan  - Continue Omnipod insulin pump with following changes:  Basal Rate  12A: 0.85 U/h  8A:   0.80 U/h  5P:   1.1 U/h    Carb Ratio  12A:  1:10  5A:    1:8  8P:    1:8    ISF  12A: 1:50    Target: 115/115    IAT: 3.5h    - Encouraged use of temp basal while working  - Due for A1c CMP  - Up to date with eye and foot exams  - Follow up in 3 months  - Encouraged to purchase ketone strips    Insulin pump status  She has had occasional pump site reactions  Continue using OTC Flonase     Hypoglycemia due to type 1 diabetes mellitus  Know how to correct  Will adjust insulin pump settings as above  Called in GVOKE     Glucagon kit: yes, parents know how to use it    FOLLOW UP  Follow up in about 3 months (around 3/17/2021).       Pump backup plan  If the insulin pump is non functional and discontinued for anticipated more than 20 hours, please give daily injections of:  Long acting insulin 26 units daily  Short acting insulin for meals according to carb ratios and sensitivity factor in the pump.    When the insulin pump is restarted, do not restart basal rates until at least 22 hours after the last long acting insulin injection. You can set a 0% temporary basal setting that will last until this time and use your pump to bolus for meals and correction.    For any technical insulin pump issues, please contact the insulin pump company; the toll free number is printed on the label on the back of the insulin pump.    Labs today and on RTC

## 2020-12-17 NOTE — PATIENT INSTRUCTIONS
Diabetes   Scan eddi more frequently   Buy ketone strips   Called in GVOKE. Glucagon is an emergency pen that is used to increase your blood sugar. Glucagon is a pen that is used in an emergency situation where you are unable to eat or drink anything. Glucagon is a medication that is given by someone else-spouse, child, etc.     Basal Rate  12A: 0.85 U/h  8A:   0.80 U/h  5P:  1.1 U/h    Carb Ratio  12A:  1:10  5A: 1:8  8P: 1:8    Hypoglycemia - Low Blood Sugar    What can you do?  Rule of 15:    Test your blood sugar   If glucose is between 50-70 mg/dL then ingest 15 grams of fast-acting carbs   If glucose is less than 50 mg/dL then ingest 30 grams of fast-acting carbs   Ingest 15 grams of fast-acting carbohydrate - such as:   a. 3-4 glucose tablets  b. 4 oz juice  c. ½ can regular soda pop  d. 15 skittles or mini jelly beans    Re-check your blood sugar in 15 minutes. If its less than 70mg/dl, repeat steps 2 and 3.   If your next meal is more than 1 hour away, eat an additional 15 grams of carbohydrate and 1 ounce of protein (examples include crackers with cheese or one-half of a sandwich with peanut butter). It is important not to eat too much because this can raise your blood sugar above the target level.      After your blood sugar has normalized, think about why you went low. If you notice a pattern of low blood sugars, contact your Diabetes Team. We may need to adjust your medication.

## 2021-03-11 ENCOUNTER — LAB VISIT (OUTPATIENT)
Dept: LAB | Facility: HOSPITAL | Age: 26
End: 2021-03-11
Payer: COMMERCIAL

## 2021-03-11 ENCOUNTER — PATIENT MESSAGE (OUTPATIENT)
Dept: ENDOCRINOLOGY | Facility: CLINIC | Age: 26
End: 2021-03-11

## 2021-03-11 DIAGNOSIS — E10.649 TYPE 1 DIABETES MELLITUS WITH HYPOGLYCEMIA AND WITHOUT COMA: ICD-10-CM

## 2021-03-11 LAB
ALBUMIN SERPL BCP-MCNC: 3.7 G/DL (ref 3.5–5.2)
ALP SERPL-CCNC: 60 U/L (ref 55–135)
ALT SERPL W/O P-5'-P-CCNC: 19 U/L (ref 10–44)
ANION GAP SERPL CALC-SCNC: 9 MMOL/L (ref 8–16)
AST SERPL-CCNC: 19 U/L (ref 10–40)
BILIRUB SERPL-MCNC: 0.6 MG/DL (ref 0.1–1)
BUN SERPL-MCNC: 14 MG/DL (ref 6–20)
CALCIUM SERPL-MCNC: 9.2 MG/DL (ref 8.7–10.5)
CHLORIDE SERPL-SCNC: 105 MMOL/L (ref 95–110)
CO2 SERPL-SCNC: 25 MMOL/L (ref 23–29)
CREAT SERPL-MCNC: 0.8 MG/DL (ref 0.5–1.4)
EST. GFR  (AFRICAN AMERICAN): >60 ML/MIN/1.73 M^2
EST. GFR  (NON AFRICAN AMERICAN): >60 ML/MIN/1.73 M^2
ESTIMATED AVG GLUCOSE: 134 MG/DL (ref 68–131)
GLUCOSE SERPL-MCNC: 128 MG/DL (ref 70–110)
HBA1C MFR BLD: 6.3 % (ref 4–5.6)
POTASSIUM SERPL-SCNC: 4.3 MMOL/L (ref 3.5–5.1)
PROT SERPL-MCNC: 6.8 G/DL (ref 6–8.4)
SODIUM SERPL-SCNC: 139 MMOL/L (ref 136–145)

## 2021-03-11 PROCEDURE — 83036 HEMOGLOBIN GLYCOSYLATED A1C: CPT | Performed by: NURSE PRACTITIONER

## 2021-03-11 PROCEDURE — 36415 COLL VENOUS BLD VENIPUNCTURE: CPT | Performed by: NURSE PRACTITIONER

## 2021-03-11 PROCEDURE — 80053 COMPREHEN METABOLIC PANEL: CPT | Performed by: NURSE PRACTITIONER

## 2021-03-15 ENCOUNTER — PATIENT MESSAGE (OUTPATIENT)
Dept: ENDOCRINOLOGY | Facility: CLINIC | Age: 26
End: 2021-03-15

## 2021-03-18 ENCOUNTER — OFFICE VISIT (OUTPATIENT)
Dept: ENDOCRINOLOGY | Facility: CLINIC | Age: 26
End: 2021-03-18
Payer: COMMERCIAL

## 2021-03-18 VITALS
HEIGHT: 62 IN | BODY MASS INDEX: 26.78 KG/M2 | SYSTOLIC BLOOD PRESSURE: 130 MMHG | WEIGHT: 145.5 LBS | DIASTOLIC BLOOD PRESSURE: 78 MMHG

## 2021-03-18 DIAGNOSIS — E10.649 TYPE 1 DIABETES MELLITUS WITH HYPOGLYCEMIA AND WITHOUT COMA: ICD-10-CM

## 2021-03-18 DIAGNOSIS — E10.649 HYPOGLYCEMIA DUE TO TYPE 1 DIABETES MELLITUS: ICD-10-CM

## 2021-03-18 DIAGNOSIS — Z96.41 INSULIN PUMP STATUS: ICD-10-CM

## 2021-03-18 PROCEDURE — 3008F BODY MASS INDEX DOCD: CPT | Mod: CPTII,S$GLB,, | Performed by: INTERNAL MEDICINE

## 2021-03-18 PROCEDURE — 99999 PR PBB SHADOW E&M-EST. PATIENT-LVL III: CPT | Mod: PBBFAC,,,

## 2021-03-18 PROCEDURE — 99999 PR PBB SHADOW E&M-EST. PATIENT-LVL III: ICD-10-PCS | Mod: PBBFAC,,,

## 2021-03-18 PROCEDURE — 3044F PR MOST RECENT HEMOGLOBIN A1C LEVEL <7.0%: ICD-10-PCS | Mod: CPTII,S$GLB,, | Performed by: INTERNAL MEDICINE

## 2021-03-18 PROCEDURE — 1126F PR PAIN SEVERITY QUANTIFIED, NO PAIN PRESENT: ICD-10-PCS | Mod: S$GLB,,, | Performed by: INTERNAL MEDICINE

## 2021-03-18 PROCEDURE — 95251 PR GLUCOSE MONITOR, 72 HOUR, PHYS INTERP: ICD-10-PCS | Mod: S$GLB,,, | Performed by: NURSE PRACTITIONER

## 2021-03-18 PROCEDURE — 3044F HG A1C LEVEL LT 7.0%: CPT | Mod: CPTII,S$GLB,, | Performed by: INTERNAL MEDICINE

## 2021-03-18 PROCEDURE — 3008F PR BODY MASS INDEX (BMI) DOCUMENTED: ICD-10-PCS | Mod: CPTII,S$GLB,, | Performed by: INTERNAL MEDICINE

## 2021-03-18 PROCEDURE — 1126F AMNT PAIN NOTED NONE PRSNT: CPT | Mod: S$GLB,,, | Performed by: INTERNAL MEDICINE

## 2021-03-18 PROCEDURE — 99214 PR OFFICE/OUTPT VISIT, EST, LEVL IV, 30-39 MIN: ICD-10-PCS | Mod: 25,S$GLB,, | Performed by: INTERNAL MEDICINE

## 2021-03-18 PROCEDURE — 95251 CONT GLUC MNTR ANALYSIS I&R: CPT | Mod: S$GLB,,, | Performed by: NURSE PRACTITIONER

## 2021-03-18 PROCEDURE — 99214 OFFICE O/P EST MOD 30 MIN: CPT | Mod: 25,S$GLB,, | Performed by: INTERNAL MEDICINE

## 2021-03-30 ENCOUNTER — PATIENT MESSAGE (OUTPATIENT)
Dept: ENDOCRINOLOGY | Facility: CLINIC | Age: 26
End: 2021-03-30

## 2021-04-02 ENCOUNTER — PATIENT MESSAGE (OUTPATIENT)
Dept: ENDOCRINOLOGY | Facility: CLINIC | Age: 26
End: 2021-04-02

## 2021-04-23 ENCOUNTER — PATIENT MESSAGE (OUTPATIENT)
Dept: ENDOCRINOLOGY | Facility: CLINIC | Age: 26
End: 2021-04-23

## 2021-04-29 DIAGNOSIS — E10.649 TYPE 1 DIABETES MELLITUS WITH HYPOGLYCEMIA AND WITHOUT COMA: Primary | ICD-10-CM

## 2021-04-29 RX ORDER — BLOOD-GLUCOSE TRANSMITTER
1 EACH MISCELLANEOUS CONTINUOUS
Qty: 1 EACH | Status: SHIPPED | OUTPATIENT
Start: 2021-04-29 | End: 2021-05-11 | Stop reason: SDUPTHER

## 2021-04-29 RX ORDER — BLOOD-GLUCOSE SENSOR
3 EACH MISCELLANEOUS CONTINUOUS
Qty: 3 EACH | Status: SHIPPED | OUTPATIENT
Start: 2021-04-29 | End: 2021-05-11 | Stop reason: SDUPTHER

## 2021-05-10 ENCOUNTER — PATIENT MESSAGE (OUTPATIENT)
Dept: ENDOCRINOLOGY | Facility: CLINIC | Age: 26
End: 2021-05-10

## 2021-05-11 DIAGNOSIS — E10.649 TYPE 1 DIABETES MELLITUS WITH HYPOGLYCEMIA AND WITHOUT COMA: ICD-10-CM

## 2021-05-11 RX ORDER — BLOOD-GLUCOSE TRANSMITTER
1 EACH MISCELLANEOUS CONTINUOUS
Qty: 1 EACH | Status: SHIPPED | OUTPATIENT
Start: 2021-05-11 | End: 2021-12-16

## 2021-05-11 RX ORDER — BLOOD-GLUCOSE SENSOR
3 EACH MISCELLANEOUS CONTINUOUS
Qty: 3 EACH | Status: SHIPPED | OUTPATIENT
Start: 2021-05-11 | End: 2021-12-16

## 2021-05-13 ENCOUNTER — PATIENT MESSAGE (OUTPATIENT)
Dept: ENDOCRINOLOGY | Facility: CLINIC | Age: 26
End: 2021-05-13

## 2021-06-10 ENCOUNTER — LAB VISIT (OUTPATIENT)
Dept: LAB | Facility: HOSPITAL | Age: 26
End: 2021-06-10
Attending: NURSE PRACTITIONER
Payer: COMMERCIAL

## 2021-06-10 DIAGNOSIS — E10.649 TYPE 1 DIABETES MELLITUS WITH HYPOGLYCEMIA AND WITHOUT COMA: ICD-10-CM

## 2021-06-10 LAB
ALBUMIN SERPL BCP-MCNC: 3.6 G/DL (ref 3.5–5.2)
ALP SERPL-CCNC: 46 U/L (ref 55–135)
ALT SERPL W/O P-5'-P-CCNC: 14 U/L (ref 10–44)
ANION GAP SERPL CALC-SCNC: 11 MMOL/L (ref 8–16)
AST SERPL-CCNC: 16 U/L (ref 10–40)
BILIRUB SERPL-MCNC: 0.5 MG/DL (ref 0.1–1)
BUN SERPL-MCNC: 11 MG/DL (ref 6–20)
CALCIUM SERPL-MCNC: 9.5 MG/DL (ref 8.7–10.5)
CHLORIDE SERPL-SCNC: 108 MMOL/L (ref 95–110)
CO2 SERPL-SCNC: 21 MMOL/L (ref 23–29)
CREAT SERPL-MCNC: 0.9 MG/DL (ref 0.5–1.4)
EST. GFR  (AFRICAN AMERICAN): >60 ML/MIN/1.73 M^2
EST. GFR  (NON AFRICAN AMERICAN): >60 ML/MIN/1.73 M^2
ESTIMATED AVG GLUCOSE: 134 MG/DL (ref 68–131)
GLUCOSE SERPL-MCNC: 128 MG/DL (ref 70–110)
HBA1C MFR BLD: 6.3 % (ref 4–5.6)
POTASSIUM SERPL-SCNC: 5.3 MMOL/L (ref 3.5–5.1)
PROT SERPL-MCNC: 6.5 G/DL (ref 6–8.4)
SODIUM SERPL-SCNC: 140 MMOL/L (ref 136–145)

## 2021-06-10 PROCEDURE — 83036 HEMOGLOBIN GLYCOSYLATED A1C: CPT | Performed by: NURSE PRACTITIONER

## 2021-06-10 PROCEDURE — 36415 COLL VENOUS BLD VENIPUNCTURE: CPT | Mod: PN | Performed by: NURSE PRACTITIONER

## 2021-06-10 PROCEDURE — 80053 COMPREHEN METABOLIC PANEL: CPT | Performed by: NURSE PRACTITIONER

## 2021-06-11 ENCOUNTER — PATIENT MESSAGE (OUTPATIENT)
Dept: ENDOCRINOLOGY | Facility: CLINIC | Age: 26
End: 2021-06-11

## 2021-06-17 ENCOUNTER — PATIENT MESSAGE (OUTPATIENT)
Dept: DIABETES | Facility: CLINIC | Age: 26
End: 2021-06-17

## 2021-06-17 ENCOUNTER — OFFICE VISIT (OUTPATIENT)
Dept: ENDOCRINOLOGY | Facility: CLINIC | Age: 26
End: 2021-06-17
Payer: COMMERCIAL

## 2021-06-17 VITALS
HEART RATE: 90 BPM | HEIGHT: 62 IN | BODY MASS INDEX: 26.67 KG/M2 | SYSTOLIC BLOOD PRESSURE: 128 MMHG | WEIGHT: 144.94 LBS | DIASTOLIC BLOOD PRESSURE: 73 MMHG

## 2021-06-17 DIAGNOSIS — Z96.41 INSULIN PUMP STATUS: ICD-10-CM

## 2021-06-17 DIAGNOSIS — E10.649 HYPOGLYCEMIA DUE TO TYPE 1 DIABETES MELLITUS: ICD-10-CM

## 2021-06-17 DIAGNOSIS — E10.649 TYPE 1 DIABETES MELLITUS WITH HYPOGLYCEMIA AND WITHOUT COMA: ICD-10-CM

## 2021-06-17 PROCEDURE — 1126F PR PAIN SEVERITY QUANTIFIED, NO PAIN PRESENT: ICD-10-PCS | Mod: S$GLB,,, | Performed by: INTERNAL MEDICINE

## 2021-06-17 PROCEDURE — 99214 OFFICE O/P EST MOD 30 MIN: CPT | Mod: 25,S$GLB,, | Performed by: INTERNAL MEDICINE

## 2021-06-17 PROCEDURE — 3008F BODY MASS INDEX DOCD: CPT | Mod: CPTII,S$GLB,, | Performed by: INTERNAL MEDICINE

## 2021-06-17 PROCEDURE — 99214 PR OFFICE/OUTPT VISIT, EST, LEVL IV, 30-39 MIN: ICD-10-PCS | Mod: 25,S$GLB,, | Performed by: INTERNAL MEDICINE

## 2021-06-17 PROCEDURE — 3044F PR MOST RECENT HEMOGLOBIN A1C LEVEL <7.0%: ICD-10-PCS | Mod: CPTII,S$GLB,, | Performed by: INTERNAL MEDICINE

## 2021-06-17 PROCEDURE — 99999 PR PBB SHADOW E&M-EST. PATIENT-LVL III: ICD-10-PCS | Mod: PBBFAC,,,

## 2021-06-17 PROCEDURE — 95251 CONT GLUC MNTR ANALYSIS I&R: CPT | Mod: S$GLB,,, | Performed by: NURSE PRACTITIONER

## 2021-06-17 PROCEDURE — 99999 PR PBB SHADOW E&M-EST. PATIENT-LVL III: CPT | Mod: PBBFAC,,,

## 2021-06-17 PROCEDURE — 3008F PR BODY MASS INDEX (BMI) DOCUMENTED: ICD-10-PCS | Mod: CPTII,S$GLB,, | Performed by: INTERNAL MEDICINE

## 2021-06-17 PROCEDURE — 95251 PR GLUCOSE MONITOR, 72 HOUR, PHYS INTERP: ICD-10-PCS | Mod: S$GLB,,, | Performed by: NURSE PRACTITIONER

## 2021-06-17 PROCEDURE — 1126F AMNT PAIN NOTED NONE PRSNT: CPT | Mod: S$GLB,,, | Performed by: INTERNAL MEDICINE

## 2021-06-17 PROCEDURE — 3044F HG A1C LEVEL LT 7.0%: CPT | Mod: CPTII,S$GLB,, | Performed by: INTERNAL MEDICINE

## 2021-07-19 DIAGNOSIS — E10.649 TYPE 1 DIABETES MELLITUS WITH HYPOGLYCEMIA AND WITHOUT COMA: ICD-10-CM

## 2021-07-19 RX ORDER — BLOOD SUGAR DIAGNOSTIC
STRIP MISCELLANEOUS
Qty: 250 EACH | Refills: 11 | Status: SHIPPED | OUTPATIENT
Start: 2021-07-19 | End: 2022-01-03

## 2021-08-18 ENCOUNTER — OFFICE VISIT (OUTPATIENT)
Dept: OPTOMETRY | Facility: CLINIC | Age: 26
End: 2021-08-18
Payer: COMMERCIAL

## 2021-08-18 DIAGNOSIS — E10.9 TYPE 1 DIABETES MELLITUS WITHOUT COMPLICATION: Primary | ICD-10-CM

## 2021-08-18 PROCEDURE — 92014 COMPRE OPH EXAM EST PT 1/>: CPT | Mod: S$GLB,,, | Performed by: OPTOMETRIST

## 2021-08-18 PROCEDURE — 99999 PR PBB SHADOW E&M-EST. PATIENT-LVL III: CPT | Mod: PBBFAC,,, | Performed by: OPTOMETRIST

## 2021-08-18 PROCEDURE — 99999 PR PBB SHADOW E&M-EST. PATIENT-LVL III: ICD-10-PCS | Mod: PBBFAC,,, | Performed by: OPTOMETRIST

## 2021-08-18 PROCEDURE — 1126F PR PAIN SEVERITY QUANTIFIED, NO PAIN PRESENT: ICD-10-PCS | Mod: CPTII,S$GLB,, | Performed by: OPTOMETRIST

## 2021-08-18 PROCEDURE — 3044F PR MOST RECENT HEMOGLOBIN A1C LEVEL <7.0%: ICD-10-PCS | Mod: CPTII,S$GLB,, | Performed by: OPTOMETRIST

## 2021-08-18 PROCEDURE — 3044F HG A1C LEVEL LT 7.0%: CPT | Mod: CPTII,S$GLB,, | Performed by: OPTOMETRIST

## 2021-08-18 PROCEDURE — 92015 DETERMINE REFRACTIVE STATE: CPT | Mod: S$GLB,,, | Performed by: OPTOMETRIST

## 2021-08-18 PROCEDURE — 1159F PR MEDICATION LIST DOCUMENTED IN MEDICAL RECORD: ICD-10-PCS | Mod: CPTII,S$GLB,, | Performed by: OPTOMETRIST

## 2021-08-18 PROCEDURE — 92015 PR REFRACTION: ICD-10-PCS | Mod: S$GLB,,, | Performed by: OPTOMETRIST

## 2021-08-18 PROCEDURE — 1159F MED LIST DOCD IN RCRD: CPT | Mod: CPTII,S$GLB,, | Performed by: OPTOMETRIST

## 2021-08-18 PROCEDURE — 92014 PR EYE EXAM, EST PATIENT,COMPREHESV: ICD-10-PCS | Mod: S$GLB,,, | Performed by: OPTOMETRIST

## 2021-08-18 PROCEDURE — 1126F AMNT PAIN NOTED NONE PRSNT: CPT | Mod: CPTII,S$GLB,, | Performed by: OPTOMETRIST

## 2021-08-18 RX ORDER — FLUCONAZOLE 150 MG/1
TABLET ORAL
COMMUNITY
Start: 2021-04-19 | End: 2022-03-17

## 2021-09-06 ENCOUNTER — PATIENT MESSAGE (OUTPATIENT)
Dept: ENDOCRINOLOGY | Facility: CLINIC | Age: 26
End: 2021-09-06

## 2021-09-09 ENCOUNTER — LAB VISIT (OUTPATIENT)
Dept: LAB | Facility: HOSPITAL | Age: 26
End: 2021-09-09
Attending: NURSE PRACTITIONER
Payer: COMMERCIAL

## 2021-09-09 ENCOUNTER — LAB VISIT (OUTPATIENT)
Dept: LAB | Facility: HOSPITAL | Age: 26
End: 2021-09-09
Attending: PEDIATRICS
Payer: COMMERCIAL

## 2021-09-09 DIAGNOSIS — E10.649 TYPE 1 DIABETES MELLITUS WITH HYPOGLYCEMIA AND WITHOUT COMA: ICD-10-CM

## 2021-09-09 LAB
ALBUMIN SERPL BCP-MCNC: 3.7 G/DL (ref 3.5–5.2)
ALBUMIN/CREAT UR: NORMAL UG/MG (ref 0–30)
ALP SERPL-CCNC: 43 U/L (ref 55–135)
ALT SERPL W/O P-5'-P-CCNC: 15 U/L (ref 10–44)
ANION GAP SERPL CALC-SCNC: 10 MMOL/L (ref 8–16)
AST SERPL-CCNC: 17 U/L (ref 10–40)
BILIRUB SERPL-MCNC: 0.8 MG/DL (ref 0.1–1)
BUN SERPL-MCNC: 11 MG/DL (ref 6–20)
CALCIUM SERPL-MCNC: 9.5 MG/DL (ref 8.7–10.5)
CHLORIDE SERPL-SCNC: 104 MMOL/L (ref 95–110)
CHOLEST SERPL-MCNC: 179 MG/DL (ref 120–199)
CHOLEST/HDLC SERPL: 2.8 {RATIO} (ref 2–5)
CO2 SERPL-SCNC: 24 MMOL/L (ref 23–29)
CREAT SERPL-MCNC: 0.8 MG/DL (ref 0.5–1.4)
CREAT UR-MCNC: 115 MG/DL (ref 15–325)
EST. GFR  (AFRICAN AMERICAN): >60 ML/MIN/1.73 M^2
EST. GFR  (NON AFRICAN AMERICAN): >60 ML/MIN/1.73 M^2
ESTIMATED AVG GLUCOSE: 126 MG/DL (ref 68–131)
GLUCOSE SERPL-MCNC: 109 MG/DL (ref 70–110)
HBA1C MFR BLD: 6 % (ref 4–5.6)
HDLC SERPL-MCNC: 63 MG/DL (ref 40–75)
HDLC SERPL: 35.2 % (ref 20–50)
LDLC SERPL CALC-MCNC: 100.6 MG/DL (ref 63–159)
MICROALBUMIN UR DL<=1MG/L-MCNC: <5 UG/ML
NONHDLC SERPL-MCNC: 116 MG/DL
POTASSIUM SERPL-SCNC: 4.3 MMOL/L (ref 3.5–5.1)
PROT SERPL-MCNC: 6.7 G/DL (ref 6–8.4)
SODIUM SERPL-SCNC: 138 MMOL/L (ref 136–145)
TRIGL SERPL-MCNC: 77 MG/DL (ref 30–150)
TSH SERPL DL<=0.005 MIU/L-ACNC: 2.31 UIU/ML (ref 0.4–4)

## 2021-09-09 PROCEDURE — 82570 ASSAY OF URINE CREATININE: CPT | Performed by: NURSE PRACTITIONER

## 2021-09-09 PROCEDURE — 36415 COLL VENOUS BLD VENIPUNCTURE: CPT | Mod: PN | Performed by: NURSE PRACTITIONER

## 2021-09-09 PROCEDURE — 80053 COMPREHEN METABOLIC PANEL: CPT | Performed by: NURSE PRACTITIONER

## 2021-09-09 PROCEDURE — 83036 HEMOGLOBIN GLYCOSYLATED A1C: CPT | Performed by: NURSE PRACTITIONER

## 2021-09-09 PROCEDURE — 84443 ASSAY THYROID STIM HORMONE: CPT | Performed by: NURSE PRACTITIONER

## 2021-09-09 PROCEDURE — 80061 LIPID PANEL: CPT | Performed by: NURSE PRACTITIONER

## 2021-09-10 ENCOUNTER — PATIENT MESSAGE (OUTPATIENT)
Dept: ENDOCRINOLOGY | Facility: CLINIC | Age: 26
End: 2021-09-10

## 2021-09-16 ENCOUNTER — PATIENT OUTREACH (OUTPATIENT)
Dept: ENDOCRINOLOGY | Facility: CLINIC | Age: 26
End: 2021-09-16

## 2021-09-16 ENCOUNTER — OFFICE VISIT (OUTPATIENT)
Dept: ENDOCRINOLOGY | Facility: CLINIC | Age: 26
End: 2021-09-16
Payer: COMMERCIAL

## 2021-09-16 VITALS
SYSTOLIC BLOOD PRESSURE: 132 MMHG | WEIGHT: 143.94 LBS | HEIGHT: 62 IN | OXYGEN SATURATION: 99 % | BODY MASS INDEX: 26.49 KG/M2 | HEART RATE: 102 BPM | DIASTOLIC BLOOD PRESSURE: 76 MMHG

## 2021-09-16 DIAGNOSIS — E10.649 HYPOGLYCEMIA DUE TO TYPE 1 DIABETES MELLITUS: ICD-10-CM

## 2021-09-16 DIAGNOSIS — E10.649 TYPE 1 DIABETES MELLITUS WITH HYPOGLYCEMIA AND WITHOUT COMA: ICD-10-CM

## 2021-09-16 DIAGNOSIS — Z96.41 INSULIN PUMP STATUS: ICD-10-CM

## 2021-09-16 PROCEDURE — 95251 PR GLUCOSE MONITOR, 72 HOUR, PHYS INTERP: ICD-10-PCS | Mod: S$GLB,,, | Performed by: NURSE PRACTITIONER

## 2021-09-16 PROCEDURE — 1159F PR MEDICATION LIST DOCUMENTED IN MEDICAL RECORD: ICD-10-PCS | Mod: CPTII,S$GLB,, | Performed by: INTERNAL MEDICINE

## 2021-09-16 PROCEDURE — 3078F DIAST BP <80 MM HG: CPT | Mod: CPTII,S$GLB,, | Performed by: INTERNAL MEDICINE

## 2021-09-16 PROCEDURE — 3044F PR MOST RECENT HEMOGLOBIN A1C LEVEL <7.0%: ICD-10-PCS | Mod: CPTII,S$GLB,, | Performed by: INTERNAL MEDICINE

## 2021-09-16 PROCEDURE — 3066F NEPHROPATHY DOC TX: CPT | Mod: CPTII,S$GLB,, | Performed by: INTERNAL MEDICINE

## 2021-09-16 PROCEDURE — 3044F HG A1C LEVEL LT 7.0%: CPT | Mod: CPTII,S$GLB,, | Performed by: INTERNAL MEDICINE

## 2021-09-16 PROCEDURE — 1159F MED LIST DOCD IN RCRD: CPT | Mod: CPTII,S$GLB,, | Performed by: INTERNAL MEDICINE

## 2021-09-16 PROCEDURE — 3075F SYST BP GE 130 - 139MM HG: CPT | Mod: CPTII,S$GLB,, | Performed by: INTERNAL MEDICINE

## 2021-09-16 PROCEDURE — 3061F NEG MICROALBUMINURIA REV: CPT | Mod: CPTII,S$GLB,, | Performed by: INTERNAL MEDICINE

## 2021-09-16 PROCEDURE — 3078F PR MOST RECENT DIASTOLIC BLOOD PRESSURE < 80 MM HG: ICD-10-PCS | Mod: CPTII,S$GLB,, | Performed by: INTERNAL MEDICINE

## 2021-09-16 PROCEDURE — 1160F PR REVIEW ALL MEDS BY PRESCRIBER/CLIN PHARMACIST DOCUMENTED: ICD-10-PCS | Mod: CPTII,S$GLB,, | Performed by: INTERNAL MEDICINE

## 2021-09-16 PROCEDURE — 99214 OFFICE O/P EST MOD 30 MIN: CPT | Mod: 25,S$GLB,, | Performed by: INTERNAL MEDICINE

## 2021-09-16 PROCEDURE — 3075F PR MOST RECENT SYSTOLIC BLOOD PRESS GE 130-139MM HG: ICD-10-PCS | Mod: CPTII,S$GLB,, | Performed by: INTERNAL MEDICINE

## 2021-09-16 PROCEDURE — 95251 CONT GLUC MNTR ANALYSIS I&R: CPT | Mod: S$GLB,,, | Performed by: NURSE PRACTITIONER

## 2021-09-16 PROCEDURE — 99999 PR PBB SHADOW E&M-EST. PATIENT-LVL IV: CPT | Mod: PBBFAC,,,

## 2021-09-16 PROCEDURE — 3066F PR DOCUMENTATION OF TREATMENT FOR NEPHROPATHY: ICD-10-PCS | Mod: CPTII,S$GLB,, | Performed by: INTERNAL MEDICINE

## 2021-09-16 PROCEDURE — 3008F PR BODY MASS INDEX (BMI) DOCUMENTED: ICD-10-PCS | Mod: CPTII,S$GLB,, | Performed by: INTERNAL MEDICINE

## 2021-09-16 PROCEDURE — 1160F RVW MEDS BY RX/DR IN RCRD: CPT | Mod: CPTII,S$GLB,, | Performed by: INTERNAL MEDICINE

## 2021-09-16 PROCEDURE — 3061F PR NEG MICROALBUMINURIA RESULT DOCUMENTED/REVIEW: ICD-10-PCS | Mod: CPTII,S$GLB,, | Performed by: INTERNAL MEDICINE

## 2021-09-16 PROCEDURE — 99999 PR PBB SHADOW E&M-EST. PATIENT-LVL IV: ICD-10-PCS | Mod: PBBFAC,,,

## 2021-09-16 PROCEDURE — 3008F BODY MASS INDEX DOCD: CPT | Mod: CPTII,S$GLB,, | Performed by: INTERNAL MEDICINE

## 2021-09-16 PROCEDURE — 99214 PR OFFICE/OUTPT VISIT, EST, LEVL IV, 30-39 MIN: ICD-10-PCS | Mod: 25,S$GLB,, | Performed by: INTERNAL MEDICINE

## 2021-11-04 RX ORDER — LEVONORGESTREL AND ETHINYL ESTRADIOL 0.1-0.02MG
1 KIT ORAL DAILY
Qty: 30 TABLET | Refills: 11 | Status: SHIPPED | OUTPATIENT
Start: 2021-11-04 | End: 2022-11-04

## 2021-11-04 RX ORDER — LEVONORGESTREL AND ETHINYL ESTRADIOL 0.1-0.02MG
KIT ORAL
Qty: 28 TABLET | Refills: 12 | Status: SHIPPED | OUTPATIENT
Start: 2021-11-04 | End: 2022-12-08 | Stop reason: SDUPTHER

## 2021-11-29 RX ORDER — FLASH GLUCOSE SENSOR
1 KIT MISCELLANEOUS
Qty: 2 KIT | Refills: 11 | Status: SHIPPED | OUTPATIENT
Start: 2021-11-29 | End: 2023-01-05

## 2021-12-02 ENCOUNTER — OFFICE VISIT (OUTPATIENT)
Dept: OBSTETRICS AND GYNECOLOGY | Facility: CLINIC | Age: 26
End: 2021-12-02
Payer: COMMERCIAL

## 2021-12-02 VITALS
DIASTOLIC BLOOD PRESSURE: 80 MMHG | WEIGHT: 140.31 LBS | BODY MASS INDEX: 25.82 KG/M2 | HEIGHT: 62 IN | SYSTOLIC BLOOD PRESSURE: 118 MMHG

## 2021-12-02 DIAGNOSIS — Z01.419 WELL WOMAN EXAM WITH ROUTINE GYNECOLOGICAL EXAM: Primary | ICD-10-CM

## 2021-12-02 PROCEDURE — 88175 CYTOPATH C/V AUTO FLUID REDO: CPT | Performed by: STUDENT IN AN ORGANIZED HEALTH CARE EDUCATION/TRAINING PROGRAM

## 2021-12-02 PROCEDURE — 99385 PREV VISIT NEW AGE 18-39: CPT | Mod: S$GLB,,, | Performed by: STUDENT IN AN ORGANIZED HEALTH CARE EDUCATION/TRAINING PROGRAM

## 2021-12-02 PROCEDURE — 99385 PR PREVENTIVE VISIT,NEW,18-39: ICD-10-PCS | Mod: S$GLB,,, | Performed by: STUDENT IN AN ORGANIZED HEALTH CARE EDUCATION/TRAINING PROGRAM

## 2021-12-02 PROCEDURE — 87591 N.GONORRHOEAE DNA AMP PROB: CPT | Performed by: STUDENT IN AN ORGANIZED HEALTH CARE EDUCATION/TRAINING PROGRAM

## 2021-12-02 PROCEDURE — 3066F NEPHROPATHY DOC TX: CPT | Mod: CPTII,S$GLB,, | Performed by: STUDENT IN AN ORGANIZED HEALTH CARE EDUCATION/TRAINING PROGRAM

## 2021-12-02 PROCEDURE — 87491 CHLMYD TRACH DNA AMP PROBE: CPT | Performed by: STUDENT IN AN ORGANIZED HEALTH CARE EDUCATION/TRAINING PROGRAM

## 2021-12-02 PROCEDURE — 3066F PR DOCUMENTATION OF TREATMENT FOR NEPHROPATHY: ICD-10-PCS | Mod: CPTII,S$GLB,, | Performed by: STUDENT IN AN ORGANIZED HEALTH CARE EDUCATION/TRAINING PROGRAM

## 2021-12-02 PROCEDURE — 3061F PR NEG MICROALBUMINURIA RESULT DOCUMENTED/REVIEW: ICD-10-PCS | Mod: CPTII,S$GLB,, | Performed by: STUDENT IN AN ORGANIZED HEALTH CARE EDUCATION/TRAINING PROGRAM

## 2021-12-02 PROCEDURE — 3061F NEG MICROALBUMINURIA REV: CPT | Mod: CPTII,S$GLB,, | Performed by: STUDENT IN AN ORGANIZED HEALTH CARE EDUCATION/TRAINING PROGRAM

## 2021-12-07 LAB
C TRACH DNA SPEC QL NAA+PROBE: NOT DETECTED
CLINICAL INFO: NORMAL
CYTO CVX: NORMAL
CYTOLOGIST CVX/VAG CYTO: NORMAL
CYTOLOGIST CVX/VAG CYTO: NORMAL
CYTOLOGY CMNT CVX/VAG CYTO-IMP: NORMAL
CYTOLOGY PAP THIN PREP EXPLANATION: NORMAL
DATE OF PREVIOUS PAP: NORMAL
DATE PREVIOUS BX: NO
GEN CATEG CVX/VAG CYTO-IMP: NORMAL
LMP START DATE: NORMAL
MICROORGANISM CVX/VAG CYTO: NORMAL
N GONORRHOEA DNA SPEC QL NAA+PROBE: NOT DETECTED
PATHOLOGIST CVX/VAG CYTO: NORMAL
SERVICE CMNT-IMP: NORMAL
SPECIMEN SOURCE CVX/VAG CYTO: NORMAL
STAT OF ADQ CVX/VAG CYTO-IMP: NORMAL

## 2021-12-09 ENCOUNTER — LAB VISIT (OUTPATIENT)
Dept: LAB | Facility: HOSPITAL | Age: 26
End: 2021-12-09
Attending: PEDIATRICS
Payer: COMMERCIAL

## 2021-12-09 DIAGNOSIS — E10.649 TYPE 1 DIABETES MELLITUS WITH HYPOGLYCEMIA AND WITHOUT COMA: ICD-10-CM

## 2021-12-09 LAB
ALBUMIN SERPL BCP-MCNC: 3.9 G/DL (ref 3.5–5.2)
ALP SERPL-CCNC: 47 U/L (ref 55–135)
ALT SERPL W/O P-5'-P-CCNC: 16 U/L (ref 10–44)
ANION GAP SERPL CALC-SCNC: 12 MMOL/L (ref 8–16)
AST SERPL-CCNC: 18 U/L (ref 10–40)
BILIRUB SERPL-MCNC: 0.6 MG/DL (ref 0.1–1)
BUN SERPL-MCNC: 11 MG/DL (ref 6–20)
CALCIUM SERPL-MCNC: 10 MG/DL (ref 8.7–10.5)
CHLORIDE SERPL-SCNC: 105 MMOL/L (ref 95–110)
CO2 SERPL-SCNC: 22 MMOL/L (ref 23–29)
CREAT SERPL-MCNC: 0.8 MG/DL (ref 0.5–1.4)
EST. GFR  (AFRICAN AMERICAN): >60 ML/MIN/1.73 M^2
EST. GFR  (NON AFRICAN AMERICAN): >60 ML/MIN/1.73 M^2
ESTIMATED AVG GLUCOSE: 137 MG/DL (ref 68–131)
GLUCOSE SERPL-MCNC: 121 MG/DL (ref 70–110)
HBA1C MFR BLD: 6.4 % (ref 4–5.6)
POTASSIUM SERPL-SCNC: 4.6 MMOL/L (ref 3.5–5.1)
PROT SERPL-MCNC: 7.3 G/DL (ref 6–8.4)
SODIUM SERPL-SCNC: 139 MMOL/L (ref 136–145)

## 2021-12-09 PROCEDURE — 83036 HEMOGLOBIN GLYCOSYLATED A1C: CPT | Performed by: NURSE PRACTITIONER

## 2021-12-09 PROCEDURE — 36415 COLL VENOUS BLD VENIPUNCTURE: CPT | Mod: PN | Performed by: NURSE PRACTITIONER

## 2021-12-09 PROCEDURE — 83516 IMMUNOASSAY NONANTIBODY: CPT | Performed by: NURSE PRACTITIONER

## 2021-12-09 PROCEDURE — 80053 COMPREHEN METABOLIC PANEL: CPT | Performed by: NURSE PRACTITIONER

## 2021-12-10 ENCOUNTER — PATIENT MESSAGE (OUTPATIENT)
Dept: ENDOCRINOLOGY | Facility: CLINIC | Age: 26
End: 2021-12-10
Payer: COMMERCIAL

## 2021-12-13 LAB — TTG IGA SER-ACNC: 5 UNITS

## 2021-12-15 ENCOUNTER — PATIENT MESSAGE (OUTPATIENT)
Dept: ENDOCRINOLOGY | Facility: CLINIC | Age: 26
End: 2021-12-15
Payer: COMMERCIAL

## 2021-12-16 ENCOUNTER — OFFICE VISIT (OUTPATIENT)
Dept: ENDOCRINOLOGY | Facility: CLINIC | Age: 26
End: 2021-12-16
Payer: COMMERCIAL

## 2021-12-16 VITALS
HEART RATE: 100 BPM | WEIGHT: 142.06 LBS | OXYGEN SATURATION: 99 % | DIASTOLIC BLOOD PRESSURE: 75 MMHG | HEIGHT: 62 IN | BODY MASS INDEX: 26.14 KG/M2 | SYSTOLIC BLOOD PRESSURE: 110 MMHG

## 2021-12-16 DIAGNOSIS — Z96.41 INSULIN PUMP STATUS: ICD-10-CM

## 2021-12-16 DIAGNOSIS — E10.649 TYPE 1 DIABETES MELLITUS WITH HYPOGLYCEMIA AND WITHOUT COMA: ICD-10-CM

## 2021-12-16 DIAGNOSIS — E10.649 HYPOGLYCEMIA DUE TO TYPE 1 DIABETES MELLITUS: ICD-10-CM

## 2021-12-16 PROCEDURE — 3061F NEG MICROALBUMINURIA REV: CPT | Mod: CPTII,S$GLB,, | Performed by: INTERNAL MEDICINE

## 2021-12-16 PROCEDURE — 99999 PR PBB SHADOW E&M-EST. PATIENT-LVL IV: CPT | Mod: PBBFAC,,,

## 2021-12-16 PROCEDURE — 99214 OFFICE O/P EST MOD 30 MIN: CPT | Mod: S$GLB,,, | Performed by: INTERNAL MEDICINE

## 2021-12-16 PROCEDURE — 99214 PR OFFICE/OUTPT VISIT, EST, LEVL IV, 30-39 MIN: ICD-10-PCS | Mod: S$GLB,,, | Performed by: INTERNAL MEDICINE

## 2021-12-16 PROCEDURE — 3066F NEPHROPATHY DOC TX: CPT | Mod: CPTII,S$GLB,, | Performed by: INTERNAL MEDICINE

## 2021-12-16 PROCEDURE — 99999 PR PBB SHADOW E&M-EST. PATIENT-LVL IV: ICD-10-PCS | Mod: PBBFAC,,,

## 2021-12-16 PROCEDURE — 3061F PR NEG MICROALBUMINURIA RESULT DOCUMENTED/REVIEW: ICD-10-PCS | Mod: CPTII,S$GLB,, | Performed by: INTERNAL MEDICINE

## 2021-12-16 PROCEDURE — 3066F PR DOCUMENTATION OF TREATMENT FOR NEPHROPATHY: ICD-10-PCS | Mod: CPTII,S$GLB,, | Performed by: INTERNAL MEDICINE

## 2021-12-16 RX ORDER — BLOOD-GLUCOSE SENSOR
3 EACH MISCELLANEOUS CONTINUOUS
Qty: 3 EACH | Status: SHIPPED | OUTPATIENT
Start: 2021-12-16 | End: 2021-12-16 | Stop reason: SDUPTHER

## 2021-12-16 RX ORDER — BLOOD-GLUCOSE SENSOR
3 EACH MISCELLANEOUS CONTINUOUS
Qty: 3 EACH | Status: SHIPPED | OUTPATIENT
Start: 2021-12-16 | End: 2022-01-06 | Stop reason: SDUPTHER

## 2021-12-16 RX ORDER — BLOOD-GLUCOSE TRANSMITTER
1 EACH MISCELLANEOUS CONTINUOUS
Qty: 1 EACH | Status: SHIPPED | OUTPATIENT
Start: 2021-12-16 | End: 2021-12-16 | Stop reason: SDUPTHER

## 2021-12-16 RX ORDER — BLOOD-GLUCOSE TRANSMITTER
1 EACH MISCELLANEOUS CONTINUOUS
Qty: 1 EACH | Status: SHIPPED | OUTPATIENT
Start: 2021-12-16 | End: 2022-01-06 | Stop reason: SDUPTHER

## 2022-01-01 ENCOUNTER — PATIENT MESSAGE (OUTPATIENT)
Dept: ENDOCRINOLOGY | Facility: CLINIC | Age: 27
End: 2022-01-01
Payer: COMMERCIAL

## 2022-01-03 DIAGNOSIS — E10.649 TYPE 1 DIABETES MELLITUS WITH HYPOGLYCEMIA AND WITHOUT COMA: ICD-10-CM

## 2022-01-04 RX ORDER — BLOOD SUGAR DIAGNOSTIC
STRIP MISCELLANEOUS
Qty: 250 EACH | Refills: 11 | Status: SHIPPED | OUTPATIENT
Start: 2022-01-04 | End: 2023-01-20

## 2022-01-06 ENCOUNTER — PATIENT MESSAGE (OUTPATIENT)
Dept: ENDOCRINOLOGY | Facility: CLINIC | Age: 27
End: 2022-01-06
Payer: COMMERCIAL

## 2022-01-06 DIAGNOSIS — E10.649 TYPE 1 DIABETES MELLITUS WITH HYPOGLYCEMIA AND WITHOUT COMA: ICD-10-CM

## 2022-01-06 RX ORDER — BLOOD-GLUCOSE SENSOR
3 EACH MISCELLANEOUS CONTINUOUS
Qty: 3 EACH | Status: SHIPPED | OUTPATIENT
Start: 2022-01-06 | End: 2022-12-27 | Stop reason: SDUPTHER

## 2022-01-06 RX ORDER — BLOOD-GLUCOSE TRANSMITTER
1 EACH MISCELLANEOUS CONTINUOUS
Qty: 1 EACH | Status: SHIPPED | OUTPATIENT
Start: 2022-01-06 | End: 2023-04-06 | Stop reason: SDUPTHER

## 2022-01-15 ENCOUNTER — PATIENT MESSAGE (OUTPATIENT)
Dept: ENDOCRINOLOGY | Facility: CLINIC | Age: 27
End: 2022-01-15
Payer: COMMERCIAL

## 2022-01-18 ENCOUNTER — PATIENT MESSAGE (OUTPATIENT)
Dept: OBSTETRICS AND GYNECOLOGY | Facility: CLINIC | Age: 27
End: 2022-01-18
Payer: COMMERCIAL

## 2022-01-19 RX ORDER — FLUCONAZOLE 150 MG/1
150 TABLET ORAL ONCE
Qty: 1 TABLET | Refills: 0 | Status: SHIPPED | OUTPATIENT
Start: 2022-01-19 | End: 2022-01-19

## 2022-02-24 DIAGNOSIS — E10.649 TYPE 1 DIABETES MELLITUS WITH HYPOGLYCEMIA AND WITHOUT COMA: ICD-10-CM

## 2022-02-24 RX ORDER — INSULIN LISPRO 100 [IU]/ML
INJECTION, SOLUTION INTRAVENOUS; SUBCUTANEOUS
Qty: 90 ML | Refills: 3 | Status: SHIPPED | OUTPATIENT
Start: 2022-02-24 | End: 2022-06-09 | Stop reason: SDUPTHER

## 2022-03-10 ENCOUNTER — PATIENT MESSAGE (OUTPATIENT)
Dept: ENDOCRINOLOGY | Facility: CLINIC | Age: 27
End: 2022-03-10
Payer: COMMERCIAL

## 2022-03-10 ENCOUNTER — LAB VISIT (OUTPATIENT)
Dept: LAB | Facility: HOSPITAL | Age: 27
End: 2022-03-10
Payer: COMMERCIAL

## 2022-03-10 DIAGNOSIS — E10.649 TYPE 1 DIABETES MELLITUS WITH HYPOGLYCEMIA AND WITHOUT COMA: ICD-10-CM

## 2022-03-10 LAB
ESTIMATED AVG GLUCOSE: 171 MG/DL (ref 68–131)
HBA1C MFR BLD: 7.6 % (ref 4–5.6)

## 2022-03-10 PROCEDURE — 83036 HEMOGLOBIN GLYCOSYLATED A1C: CPT | Performed by: NURSE PRACTITIONER

## 2022-03-10 PROCEDURE — 36415 COLL VENOUS BLD VENIPUNCTURE: CPT | Mod: PN | Performed by: NURSE PRACTITIONER

## 2022-03-17 ENCOUNTER — PATIENT MESSAGE (OUTPATIENT)
Dept: ENDOCRINOLOGY | Facility: CLINIC | Age: 27
End: 2022-03-17

## 2022-03-17 ENCOUNTER — OFFICE VISIT (OUTPATIENT)
Dept: ENDOCRINOLOGY | Facility: CLINIC | Age: 27
End: 2022-03-17
Payer: COMMERCIAL

## 2022-03-17 VITALS
HEART RATE: 86 BPM | WEIGHT: 140.31 LBS | DIASTOLIC BLOOD PRESSURE: 78 MMHG | OXYGEN SATURATION: 99 % | BODY MASS INDEX: 25.82 KG/M2 | SYSTOLIC BLOOD PRESSURE: 118 MMHG | HEIGHT: 62 IN

## 2022-03-17 DIAGNOSIS — Z96.41 INSULIN PUMP STATUS: ICD-10-CM

## 2022-03-17 DIAGNOSIS — E10.649 HYPOGLYCEMIA DUE TO TYPE 1 DIABETES MELLITUS: ICD-10-CM

## 2022-03-17 DIAGNOSIS — E10.649 TYPE 1 DIABETES MELLITUS WITH HYPOGLYCEMIA AND WITHOUT COMA: ICD-10-CM

## 2022-03-17 PROCEDURE — 99214 OFFICE O/P EST MOD 30 MIN: CPT | Mod: 25,S$GLB,, | Performed by: INTERNAL MEDICINE

## 2022-03-17 PROCEDURE — 3008F BODY MASS INDEX DOCD: CPT | Mod: CPTII,S$GLB,, | Performed by: INTERNAL MEDICINE

## 2022-03-17 PROCEDURE — 1160F PR REVIEW ALL MEDS BY PRESCRIBER/CLIN PHARMACIST DOCUMENTED: ICD-10-PCS | Mod: CPTII,S$GLB,, | Performed by: INTERNAL MEDICINE

## 2022-03-17 PROCEDURE — 99999 PR PBB SHADOW E&M-EST. PATIENT-LVL IV: CPT | Mod: PBBFAC,,,

## 2022-03-17 PROCEDURE — 1160F RVW MEDS BY RX/DR IN RCRD: CPT | Mod: CPTII,S$GLB,, | Performed by: INTERNAL MEDICINE

## 2022-03-17 PROCEDURE — 3008F PR BODY MASS INDEX (BMI) DOCUMENTED: ICD-10-PCS | Mod: CPTII,S$GLB,, | Performed by: INTERNAL MEDICINE

## 2022-03-17 PROCEDURE — 1159F PR MEDICATION LIST DOCUMENTED IN MEDICAL RECORD: ICD-10-PCS | Mod: CPTII,S$GLB,, | Performed by: INTERNAL MEDICINE

## 2022-03-17 PROCEDURE — 3078F PR MOST RECENT DIASTOLIC BLOOD PRESSURE < 80 MM HG: ICD-10-PCS | Mod: CPTII,S$GLB,, | Performed by: INTERNAL MEDICINE

## 2022-03-17 PROCEDURE — 95251 PR GLUCOSE MONITOR, 72 HOUR, PHYS INTERP: ICD-10-PCS | Mod: S$GLB,,, | Performed by: NURSE PRACTITIONER

## 2022-03-17 PROCEDURE — 99999 PR PBB SHADOW E&M-EST. PATIENT-LVL IV: ICD-10-PCS | Mod: PBBFAC,,,

## 2022-03-17 PROCEDURE — 3074F PR MOST RECENT SYSTOLIC BLOOD PRESSURE < 130 MM HG: ICD-10-PCS | Mod: CPTII,S$GLB,, | Performed by: INTERNAL MEDICINE

## 2022-03-17 PROCEDURE — 3078F DIAST BP <80 MM HG: CPT | Mod: CPTII,S$GLB,, | Performed by: INTERNAL MEDICINE

## 2022-03-17 PROCEDURE — 3074F SYST BP LT 130 MM HG: CPT | Mod: CPTII,S$GLB,, | Performed by: INTERNAL MEDICINE

## 2022-03-17 PROCEDURE — 1159F MED LIST DOCD IN RCRD: CPT | Mod: CPTII,S$GLB,, | Performed by: INTERNAL MEDICINE

## 2022-03-17 PROCEDURE — 3051F HG A1C>EQUAL 7.0%<8.0%: CPT | Mod: CPTII,S$GLB,, | Performed by: INTERNAL MEDICINE

## 2022-03-17 PROCEDURE — 95251 CONT GLUC MNTR ANALYSIS I&R: CPT | Mod: S$GLB,,, | Performed by: NURSE PRACTITIONER

## 2022-03-17 PROCEDURE — 99214 PR OFFICE/OUTPT VISIT, EST, LEVL IV, 30-39 MIN: ICD-10-PCS | Mod: 25,S$GLB,, | Performed by: INTERNAL MEDICINE

## 2022-03-17 PROCEDURE — 3051F PR MOST RECENT HEMOGLOBIN A1C LEVEL 7.0 - < 8.0%: ICD-10-PCS | Mod: CPTII,S$GLB,, | Performed by: INTERNAL MEDICINE

## 2022-03-17 NOTE — PATIENT INSTRUCTIONS
Dexcom and pump reviewed: she is having prolonged postprandial hyperglycemia with dinner and overnight hypoglycemia. Will back up her 9AM to 5AM during the time she is sleeping.     She is often afraid to bolus. Will leave correction factor the same for now.     Basal Rate  12A: 0.8 U/h  5A:   0.75 U/h  5P:  1.05 U/h    Carb Ratio  12A:  12  8P: 12    ISF  12A: 1:60    Target: 130  IAT: 4h    TDD: 33.7 units   Basal 57% Bolus 43%%    Hypoglycemia - Low Blood Sugar    What can you do?  Rule of 15:   Test your blood sugar  If glucose is between 50-70 mg/dL then ingest 15 grams of fast-acting carbs  If glucose is less than 50 mg/dL then ingest 30 grams of fast-acting carbs  Ingest 15 grams of fast-acting carbohydrate - such as:   3-4 glucose tablets  4 oz juice  ½ can regular soda pop  15 skittles or mini jelly beans   Re-check your blood sugar in 15 minutes. If its less than 70mg/dl, repeat steps 2 and 3.  If your next meal is more than 1 hour away, eat an additional 15 grams of carbohydrate and 1 ounce of protein (examples include crackers with cheese or one-half of a sandwich with peanut butter). It is important not to eat too much because this can raise your blood sugar above the target level.      After your blood sugar has normalized, think about why you went low. If you notice a pattern of low blood sugars, contact your Diabetes Team. We may need to adjust your medication.

## 2022-03-17 NOTE — ASSESSMENT & PLAN NOTE
Type 1 DM with HbA1c of 7.6% however with signicant glycemic variability.     Plan  - Continue Omnipod insulin pump with following changes:    Pump Settings  Dexcom and pump reviewed: she is having prolonged postprandial hyperglycemia with dinner and overnight hypoglycemia. Will back up her 9AM to 5AM during the time she is sleeping.     She is often afraid to bolus. Will leave correction factor the same for now.     Basal Rate  12A: 0.8 U/h  5A:   0.75 U/h  5P:  1.05 U/h    Carb Ratio  12A:  12  8P: 12    ISF  12A: 1:60    Target: 130  IAT: 4h    TDD: 33.7 units   Basal 57% Bolus 43%%    She will work on overcorrecting  Will plan for Control IQ when eligible     - Encouraged use of temp basal while working  - Due for A1c  - Up to date with eye    - Foot exam done in June 2021  - Follow up in 3 months  - has glucagon   - needs ketone

## 2022-03-17 NOTE — PROGRESS NOTES
Pump Clinic Return Visit    Type 1 DM     HPI:Tammie Gomez is a 27 y.o. female who was diagnosed with type 1 DM in 1999.  Last visit with me in Dec 2021.    Interval history:   She is now on dexcom. Was on eddi 2 at her last visit      At her last visit, we weakened basals as she was basal heavy.    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 She recently graduated with biology degree and wants to join the work field. Eventually, she would like to go to nursing school.     Reports having occasional skin reactions to pod--has tried flonase and helped -none recent but some with dexcom..    Current diabetes regimen:   Pump: Omnipod since 8th grade with humalog    Pump Settings  Basal Rate  12A: 0.8 U/h  9A:   0.75 U/h  5P:  0.95 U/h    Carb Ratio  12A:  12  8P: 14    ISF  12A: 1:60    Target: 130  IAT: 4h    TDD: 33.7 units   Basal 57% Bolus 43%%    Lab Results   Component Value Date    HGBA1C 7.6 (H) 03/10/2022    HGBA1C 6.4 (H) 12/09/2021    HGBA1C 6.0 (H) 09/09/2021     Glucose Monitoring:  Meter/CGM: CGM freestyle  Pump and meter report downloaded and reviewed, see report in media tab    She is afraid of hypoglycemia and often correcting in the 160's and then she overcorrecting.  She is also putting in less carbs than what she is eating.     She is trying to bolus before she eats. She is giving boluses 10-15 minutes before a meal.     Pt is monitoring blood glucose readings 4 times a day.  Needs >100 strips per month related to fluctuations with blood glucose reading, A1c trends, and activity level.    Hypoglycemic Episodes:  Yes, in AM. Has noticed lower blood sugars after exercise (bikes) and agrees to give 75% of carbs.  Has noticed more hypoglycemia while working.   Since last visit, no severe hypoglycemic episodes requiring observer intervention, has glucagon kit, parents know how to use it    Has glucose tabs.   Has glucagon  Has backup pens  Has ketone strips    DKA:  "Never    Screening / DM Complications:  Neuropathy: No  Last foot exam : 06/17/2021  Last eye exam : 08/18/2021;  no laser surgery or DR  CVD/MI: No    Lab Results   Component Value Date    TSH 2.307 09/09/2021     Lab Results   Component Value Date    TTGIGA 5 12/09/2021     Diet/Exercise:  Has been having protein bars or crossaint in the AM   Only eats 1 meal big meal (dinner) and snacks throughout the day   Snacks: cheezit   Social alcohol-2 or 3 drinks week    Pregnancy plans: on OCP; no pregnancy plans yet    Diabetes Management Status    Statin: Not taking  ACE/ARB: Not taking    Screening or Prevention Patient's value Goal Complete/Controlled?   HgA1C Testing and Control   Lab Results   Component Value Date    HGBA1C 7.6 (H) 03/10/2022      Annually/Less than 8% Yes   Lipid profile : 09/09/2021 Annually Yes   LDL control Lab Results   Component Value Date    LDLCALC 100.6 09/09/2021    Annually/Less than 100 mg/dl  No   Nephropathy screening Lab Results   Component Value Date    LABMICR <5.0 09/09/2021     No results found for: PROTEINUA Annually No   Blood pressure BP Readings from Last 1 Encounters:   03/17/22 118/78    Less than 140/90 Yes   Dilated retinal exam : 08/18/2021 Annually Yes   Foot exam   : 06/17/2021 Annually Yes     Review of Systems   Constitutional: Negative for fatigue and unexpected weight change.   Eyes: Negative for visual disturbance.   Endocrine: Negative for polydipsia, polyphagia and polyuria.     Vital Signs  /78   Pulse 86   Ht 5' 2" (1.575 m)   Wt 63.6 kg (140 lb 5.2 oz)   SpO2 99%   BMI 25.67 kg/m²     Physical Exam  Vitals reviewed.   Constitutional:       Appearance: She is well-developed.   Pulmonary:      Effort: Pulmonary effort is normal.   Abdominal:      Palpations: Abdomen is soft.     Pump site are without edema or erythema  Denies sores or lesions to feet  DM foot exam done in June 2021; deferred today      Chemistry        Component Value Date/Time    NA " 139 12/09/2021 0954    K 4.6 12/09/2021 0954     12/09/2021 0954    CO2 22 (L) 12/09/2021 0954    BUN 11 12/09/2021 0954    CREATININE 0.8 12/09/2021 0954     (H) 12/09/2021 0954        Component Value Date/Time    CALCIUM 10.0 12/09/2021 0954    ALKPHOS 47 (L) 12/09/2021 0954    AST 18 12/09/2021 0954    ALT 16 12/09/2021 0954    BILITOT 0.6 12/09/2021 0954    ESTGFRAFRICA >60.0 12/09/2021 0954    EGFRNONAA >60.0 12/09/2021 0954           Lab Results   Component Value Date    MICALBCREAT Unable to calculate 09/09/2021     Lab Results   Component Value Date    CHOL 179 09/09/2021    CHOL 195 09/17/2020    CHOL 203 (H) 12/04/2019     Lab Results   Component Value Date    HDL 63 09/09/2021    HDL 58 09/17/2020    HDL 77 (H) 12/04/2019     Lab Results   Component Value Date    LDLCALC 100.6 09/09/2021    LDLCALC 125.4 09/17/2020    LDLCALC 111.2 12/04/2019     Lab Results   Component Value Date    TRIG 77 09/09/2021    TRIG 58 09/17/2020    TRIG 74 12/04/2019     Lab Results   Component Value Date    CHOLHDL 35.2 09/09/2021    CHOLHDL 29.7 09/17/2020    CHOLHDL 37.9 12/04/2019       Assessment/Plan  Type 1 diabetes mellitus with hypoglycemia and without coma  Type 1 DM with HbA1c of 7.6% however with signicant glycemic variability.     Plan  - Continue Omnipod insulin pump with following changes:    Pump Settings  Dexcom and pump reviewed: she is having prolonged postprandial hyperglycemia with dinner and overnight hypoglycemia. Will back up her 9AM to 5AM during the time she is sleeping.     She is often afraid to bolus. Will leave correction factor the same for now.     Basal Rate  12A: 0.8 U/h  5A:   0.75 U/h  5P:  1.05 U/h    Carb Ratio  12A:  12  8P: 12    ISF  12A: 1:60    Target: 130  IAT: 4h    TDD: 33.7 units   Basal 57% Bolus 43%%    She will work on overcorrecting  Will plan for Control IQ when eligible     - Encouraged use of temp basal while working  - Due for A1c  - Up to date with eye    -  Foot exam done in June 2021  - Follow up in 3 months  - has glucagon   - needs ketone    Insulin pump status  She has had occasional pump site reactions  Continue using OTC Flonase     Hypoglycemia due to type 1 diabetes mellitus  Know how to correct  Will adjust insulin pump settings as above  Glucagon has     Glucagon kit: yes, parents know how to use it    FOLLOW UP  Follow up in about 3 months (around 6/17/2022).       Pump backup plan  If the insulin pump is non functional and discontinued for anticipated more than 20 hours, please give daily injections of:  Long acting insulin 26 units daily  Short acting insulin for meals according to carb ratios and sensitivity factor in the pump.    When the insulin pump is restarted, do not restart basal rates until at least 22 hours after the last long acting insulin injection. You can set a 0% temporary basal setting that will last until this time and use your pump to bolus for meals and correction.    For any technical insulin pump issues, please contact the insulin pump company; the toll free number is printed on the label on the back of the insulin pump.    Labs today and on RTC

## 2022-03-30 ENCOUNTER — PATIENT MESSAGE (OUTPATIENT)
Dept: ENDOCRINOLOGY | Facility: CLINIC | Age: 27
End: 2022-03-30
Payer: COMMERCIAL

## 2022-03-30 DIAGNOSIS — E10.649 TYPE 1 DIABETES MELLITUS WITH HYPOGLYCEMIA AND WITHOUT COMA: Primary | ICD-10-CM

## 2022-04-11 ENCOUNTER — PATIENT MESSAGE (OUTPATIENT)
Dept: ENDOCRINOLOGY | Facility: CLINIC | Age: 27
End: 2022-04-11
Payer: COMMERCIAL

## 2022-04-20 ENCOUNTER — CLINICAL SUPPORT (OUTPATIENT)
Dept: DIABETES | Facility: CLINIC | Age: 27
End: 2022-04-20
Payer: COMMERCIAL

## 2022-04-20 DIAGNOSIS — E10.649 TYPE 1 DIABETES MELLITUS WITH HYPOGLYCEMIA AND WITHOUT COMA: ICD-10-CM

## 2022-04-20 PROCEDURE — G0108 PR DIAB MANAGE TRN  PER INDIV: ICD-10-PCS | Mod: S$GLB,,, | Performed by: INTERNAL MEDICINE

## 2022-04-20 PROCEDURE — 99999 PR PBB SHADOW E&M-EST. PATIENT-LVL II: ICD-10-PCS | Mod: PBBFAC,,,

## 2022-04-20 PROCEDURE — G0108 DIAB MANAGE TRN  PER INDIV: HCPCS | Mod: S$GLB,,, | Performed by: INTERNAL MEDICINE

## 2022-04-20 PROCEDURE — 99999 PR PBB SHADOW E&M-EST. PATIENT-LVL II: CPT | Mod: PBBFAC,,,

## 2022-04-20 NOTE — PROGRESS NOTES
Diabetes Care Specialist Progress Note  Author: Emerald Yu RD, CDE  Date: 2022    Program Intake  Reason for Diabetes Program Visit:: Intervention  Type of Intervention:: Individual  Individual: Education    Education: Advanced Pump, Pattern Management    Current diabetes risk level:: moderate      Lab Results   Component Value Date    HGBA1C 7.6 (H) 03/10/2022         Clinical  Problem Review  Reviewed Problem List with Patient: yes  Active comorbidities affecting diabetes self-care.: no  Reviewed health maintenance: yes    Clinical Assessment  Current Diabetes Treatment: Insulin, Insulin pump   Humalog via Omnipod (traditional PDM)  Basal:                           12am: 0.8 un/hr    5am:  0.75    5pm:  1.05              ISF:                           12am:  60              ICR:                           12am:  12              Target:                           12am: 130-150 mg/dL              AIT:                           4 hours      Have you ever experienced hypoglycemia (low blood sugar)?: yes  In the last month, how often have you experienced low blood sugar?: more than once a week (<1% low, 0% low)  Are you able to tell when your blood sugar is low?: Yes  How do you treat hypoglycemia (low blood sugar)?: 1/2 can soda/fruit juice    Have you ever experienced hyperglycemia (high blood sugar)?: yes  In the last month, how often have you experienced high blood sugar?: once a day (36% high, 16% very high)    SMBG via Dexcom G6  Average B mg/dL  Time in Range: 47%  GMI: 7.7%        Medication Information  How many days a week do you miss your medications?: Never (not missing any boluses, but overriding a lot of boluses)  Do you sometimes have difficulty refilling your medications?: No  Medication adherence impacting ability to self-manage diabetes?: Yes (hx of overriding most boluses)    Labs  Do you have regular lab work to monitor your medications?: Yes  Lab Compliance Barriers:  No        Nutritional Status  Diet: Regular   -   usually 2 meals daily (one before work, one after work) and 2 snacks; +carb counting    Change in appetite?: No  Recent Changes in Weight: No Recent Weight Change    Current nutritional status an area of need that is impacting patient's ability to self-manage diabetes?: No              Additional Social History  Support  Does anyone support you with your diabetes care?:  (pt is primary caregiver)  Who takes you to your medical appointments?: self  Does the current support meet the patient's needs?: Yes  Is Support an area impacting ability to self-manage diabetes?: No    Access to Mass Media & Technology  Does the patient have access to any of the following devices or technologies?: Smart phone, Internet Access  Media or technology needs impacting ability to self-manage diabetes?: No    Cognitive/Behavioral Health  Alert and Oriented: Yes  Difficulty Thinking: No  Requires Prompting: No  Requires assistance for routine expression?: No  Cognitive or behavioral barriers impacting ability to self-manage diabetes?: No    Culture/Sabianism  Culture or Moravian beliefs that may impact ability to access healthcare: No    Communication  Language preference: English  Hearing Problems: No  Vision Problems: No  Communication needs impacting ability to self-manage diabetes?: No    Health Literacy  Preferred Learning Method: Face to Face, Demonstration, Video, Hands On, Web Based, Reading Materials  How often do you need to have someone help you read instructions, pamphlets, or written material from your doctor or pharmacy?: Never  Health literacy needs impacting ability to self-manage diabetes?: No               Diabetes Self-Management Care Plan:    Today's Diabetes Self-Management Care Plan was developed with Tammie's input. Tammie has agreed to work toward the following goal(s) to improve his/her overall diabetes control.      Care Plan: Diabetes Management   Updates made  since 3/22/2022 12:00 AM      Problem: Medications       Long-Range Goal: Patient will use insulin pump appropriately as instructed: will change pump site every 3 days; will bolus for all carbs 15 minutes prior to eating.    Start Date: 4/20/2022   Expected End Date: 4/20/2023   Priority: High   Barriers: No Barriers Identified   Note:    Here today to download pump and review setting changes.   Having hypo usually late night, 9pm - 12am, right when she is getting off work.     She continues to override boluses; very fearful of hypo, especially when at work.   She will decrease calculated bolus amount for a correction bolus.   She will decrease total carb input (up to 50%) when giving carb bolus for meals and not wanting to drop low.   No severe hypos evident on dexcom reports.   Minimal hypo (<1%) in th 55-70 range.  She does report occasions where she will drop low, drink coke, and then drop low again a few minutes later.     Pump reports reviewed with LUIS CARLOS [employer] and with pt today.   The following changes were made to pump settings:    Basal:                           12am: 0.8 un/hr    3am:  0.65  (decreased)    10am:  0.85  (increased earlier in the day; decreased later in the day)              ISF:                           12am:  65                          12pm:  60              ICR:                           12am:  12              Target:                           12am: 150-150 mg/dL              AIT:                           4 hours    Emphasized importance of bolusing for full carb amount as she always spikes after eating/bolusing for carbs (likely b/c of under-entering amount).     Encouraged to bolus for full carb amount especially when BG is already elevated.   Can reduce total carb input by 15 gm ONLY if BG is <180 and she is at work and fearful of hypo. Do NOT reduce by 50%.     Discussed that ISF weakened today so please try not to override correction boluses. Target BG raised so correction bolus  will calculate to 150 instead of 130.          Task: Discussed appropriate timing and administration of calculated bolus amounts Completed 4/21/2022      Task: Reviewed appropriate use of Omnipod insulin delivery system. Completed 4/21/2022      Task: Reviewed with patient all current diabetes medications and provided basic review of insulin pump. Completed 4/21/2022                  Follow Up Plan     F/u with DE in pump clinic          Today's care plan and follow up schedule was discussed with patient.  Tammie verbalized understanding of the care plan, goals, and agrees to follow up plan.        The patient was encouraged to communicate with his/her health care provider/physician and care team regarding his/her condition(s) and treatment.  I provided the patient with my contact information today and encouraged to contact me via phone or [facility] Patient Portal as needed.             Length of Visit   Total Time: 60 Minutes

## 2022-04-25 ENCOUNTER — PATIENT MESSAGE (OUTPATIENT)
Dept: ENDOCRINOLOGY | Facility: CLINIC | Age: 27
End: 2022-04-25
Payer: COMMERCIAL

## 2022-05-09 ENCOUNTER — PATIENT MESSAGE (OUTPATIENT)
Dept: ENDOCRINOLOGY | Facility: CLINIC | Age: 27
End: 2022-05-09
Payer: COMMERCIAL

## 2022-06-16 ENCOUNTER — LAB VISIT (OUTPATIENT)
Dept: LAB | Facility: HOSPITAL | Age: 27
End: 2022-06-16
Attending: NURSE PRACTITIONER
Payer: COMMERCIAL

## 2022-06-16 DIAGNOSIS — E10.649 TYPE 1 DIABETES MELLITUS WITH HYPOGLYCEMIA AND WITHOUT COMA: ICD-10-CM

## 2022-06-16 LAB
ESTIMATED AVG GLUCOSE: 186 MG/DL (ref 68–131)
HBA1C MFR BLD: 8.1 % (ref 4–5.6)

## 2022-06-16 PROCEDURE — 83036 HEMOGLOBIN GLYCOSYLATED A1C: CPT | Performed by: NURSE PRACTITIONER

## 2022-06-16 PROCEDURE — 36415 COLL VENOUS BLD VENIPUNCTURE: CPT | Mod: PN | Performed by: NURSE PRACTITIONER

## 2022-06-23 ENCOUNTER — OFFICE VISIT (OUTPATIENT)
Dept: ENDOCRINOLOGY | Facility: CLINIC | Age: 27
End: 2022-06-23
Payer: COMMERCIAL

## 2022-06-23 VITALS
HEART RATE: 114 BPM | OXYGEN SATURATION: 99 % | SYSTOLIC BLOOD PRESSURE: 130 MMHG | HEIGHT: 62 IN | WEIGHT: 141.44 LBS | DIASTOLIC BLOOD PRESSURE: 74 MMHG | BODY MASS INDEX: 26.03 KG/M2

## 2022-06-23 DIAGNOSIS — E10.649 HYPOGLYCEMIA DUE TO TYPE 1 DIABETES MELLITUS: ICD-10-CM

## 2022-06-23 DIAGNOSIS — Z96.41 INSULIN PUMP STATUS: ICD-10-CM

## 2022-06-23 DIAGNOSIS — E10.649 TYPE 1 DIABETES MELLITUS WITH HYPOGLYCEMIA AND WITHOUT COMA: ICD-10-CM

## 2022-06-23 PROCEDURE — 99214 PR OFFICE/OUTPT VISIT, EST, LEVL IV, 30-39 MIN: ICD-10-PCS | Mod: 25,S$GLB,, | Performed by: INTERNAL MEDICINE

## 2022-06-23 PROCEDURE — 99999 PR PBB SHADOW E&M-EST. PATIENT-LVL V: ICD-10-PCS | Mod: PBBFAC,,,

## 2022-06-23 PROCEDURE — 3008F BODY MASS INDEX DOCD: CPT | Mod: CPTII,S$GLB,, | Performed by: INTERNAL MEDICINE

## 2022-06-23 PROCEDURE — 1159F PR MEDICATION LIST DOCUMENTED IN MEDICAL RECORD: ICD-10-PCS | Mod: CPTII,S$GLB,, | Performed by: INTERNAL MEDICINE

## 2022-06-23 PROCEDURE — 3052F HG A1C>EQUAL 8.0%<EQUAL 9.0%: CPT | Mod: CPTII,S$GLB,, | Performed by: INTERNAL MEDICINE

## 2022-06-23 PROCEDURE — 3008F PR BODY MASS INDEX (BMI) DOCUMENTED: ICD-10-PCS | Mod: CPTII,S$GLB,, | Performed by: INTERNAL MEDICINE

## 2022-06-23 PROCEDURE — 1160F PR REVIEW ALL MEDS BY PRESCRIBER/CLIN PHARMACIST DOCUMENTED: ICD-10-PCS | Mod: CPTII,S$GLB,, | Performed by: INTERNAL MEDICINE

## 2022-06-23 PROCEDURE — 3052F PR MOST RECENT HEMOGLOBIN A1C LEVEL 8.0 - < 9.0%: ICD-10-PCS | Mod: CPTII,S$GLB,, | Performed by: INTERNAL MEDICINE

## 2022-06-23 PROCEDURE — 1160F RVW MEDS BY RX/DR IN RCRD: CPT | Mod: CPTII,S$GLB,, | Performed by: INTERNAL MEDICINE

## 2022-06-23 PROCEDURE — 3075F SYST BP GE 130 - 139MM HG: CPT | Mod: CPTII,S$GLB,, | Performed by: INTERNAL MEDICINE

## 2022-06-23 PROCEDURE — 1159F MED LIST DOCD IN RCRD: CPT | Mod: CPTII,S$GLB,, | Performed by: INTERNAL MEDICINE

## 2022-06-23 PROCEDURE — 95251 PR GLUCOSE MONITOR, 72 HOUR, PHYS INTERP: ICD-10-PCS | Mod: S$GLB,,, | Performed by: NURSE PRACTITIONER

## 2022-06-23 PROCEDURE — 3078F DIAST BP <80 MM HG: CPT | Mod: CPTII,S$GLB,, | Performed by: INTERNAL MEDICINE

## 2022-06-23 PROCEDURE — 3075F PR MOST RECENT SYSTOLIC BLOOD PRESS GE 130-139MM HG: ICD-10-PCS | Mod: CPTII,S$GLB,, | Performed by: INTERNAL MEDICINE

## 2022-06-23 PROCEDURE — 99999 PR PBB SHADOW E&M-EST. PATIENT-LVL V: CPT | Mod: PBBFAC,,,

## 2022-06-23 PROCEDURE — 99214 OFFICE O/P EST MOD 30 MIN: CPT | Mod: 25,S$GLB,, | Performed by: INTERNAL MEDICINE

## 2022-06-23 PROCEDURE — 3078F PR MOST RECENT DIASTOLIC BLOOD PRESSURE < 80 MM HG: ICD-10-PCS | Mod: CPTII,S$GLB,, | Performed by: INTERNAL MEDICINE

## 2022-06-23 PROCEDURE — 95251 CONT GLUC MNTR ANALYSIS I&R: CPT | Mod: S$GLB,,, | Performed by: NURSE PRACTITIONER

## 2022-06-23 RX ORDER — INSULIN PMP CART,AUT,G6/7,CNTR
1 EACH SUBCUTANEOUS ONCE
Qty: 1 EACH | Refills: 0 | Status: SHIPPED | OUTPATIENT
Start: 2022-06-23 | End: 2022-06-24

## 2022-06-23 RX ORDER — INSULIN PMP CART,AUT,G6/7,CNTR
1 EACH SUBCUTANEOUS
Qty: 30 EACH | Refills: 3 | Status: SHIPPED | OUTPATIENT
Start: 2022-06-23 | End: 2023-06-28 | Stop reason: SDUPTHER

## 2022-06-23 NOTE — ASSESSMENT & PLAN NOTE
Type 1 DM with HbA1c of 8.1% however with signicant glycemic variability.     Plan  - Continue Omnipod insulin pump with following changes:    Pump Settings  She is having midday lower end of normal blood sugars and some higher blood sugars while awake. Will change below  She will work on avoiding  Manual bolusing,changing calculator and avoid extended boluses due to fear of low blood sugar.    Basal  MD-4AM: 0.9  4AM-8AM: 0.65  8AM-12P: 0.55  12P-MD: 0.8    ICR 12    Called in Control IQ Dash     - Encouraged use of temp basal while working  - Due for A1c  - Up to date with eye    - Foot exam done in June 2021  - Follow up in 3 months  - has glucagon   - needs ketone

## 2022-06-23 NOTE — PROGRESS NOTES
Pump Clinic Return Visit    Type 1 DM     HPI:Tammie Gomez is a 27 y.o. female who was diagnosed with type 1 DM in 1999.  Last visit with me in March 2022.    Interval history:   She is now on dexcom. Was on eddi 2 at her last visit      At her last visit, we increased her basals.      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 She recently graduated with biology degree and wants to join the work field. Eventually, she would like to go to nursing school.     Since her last visit, she moved into her own apartment.     Reports having occasional skin reactions to pod--has tried flonase and helped -none recent but some with dexcom..    Current diabetes regimen:   Pump: Omnipod since 8th grade with humalog    Pump Settings        TDD: 29.9 units   Basal 60% Bolus 40%%    Lab Results   Component Value Date    HGBA1C 8.1 (H) 06/16/2022    HGBA1C 7.6 (H) 03/10/2022    HGBA1C 6.4 (H) 12/09/2021     Glucose Monitoring:  Meter/CGM: CGM freestyle  Pump and meter report downloaded and reviewed, see report in media tab    She is afraid of hypoglycemia and often correcting in the 160's and then she overcorrecting.  She is also putting in less carbs than what she is eating. She is manually bolusing. She is often extending her boluses.     She is trying to bolus before she eats. She is giving boluses 10-15 minutes before a meal.     Pt is monitoring blood glucose readings 4 times a day.  Needs >100 strips per month related to fluctuations with blood glucose reading, A1c trends, and activity level.    Hypoglycemic Episodes:  Yes, in AM. Has noticed lower blood sugars after exercise (bikes) and agrees to give 75% of carbs.  She is no longer having hypoglycemia while at work.  Since last visit, no severe hypoglycemic episodes requiring observer intervention, has glucagon kit, parents know how to use it    Has glucose tabs.   Has glucagon  Has backup pens  Has ketone strips    DKA: Never    Screening / DM  "Complications:  Neuropathy: No  Last foot exam : 06/17/2021  Last eye exam : 08/18/2021;  no laser surgery or DR  CVD/MI: No    Lab Results   Component Value Date    TSH 2.307 09/09/2021     Lab Results   Component Value Date    TTGIGA 5 12/09/2021     Diet/Exercise:  Has been having protein bars or crossaint in the AM   Only eats 1 meal big meal (dinner) and snacks throughout the day   Snacks: cheezit   Social alcohol-2 or 3 drinks week    Pregnancy plans: on OCP; no pregnancy plans yet    Diabetes Management Status    Statin: Not taking  ACE/ARB: Not taking    Screening or Prevention Patient's value Goal Complete/Controlled?   HgA1C Testing and Control   Lab Results   Component Value Date    HGBA1C 8.1 (H) 06/16/2022      Annually/Less than 8% Yes   Lipid profile : 09/09/2021 Annually Yes   LDL control Lab Results   Component Value Date    LDLCALC 100.6 09/09/2021    Annually/Less than 100 mg/dl  No   Nephropathy screening Lab Results   Component Value Date    LABMICR <5.0 09/09/2021     No results found for: PROTEINUA Annually No   Blood pressure BP Readings from Last 1 Encounters:   06/23/22 130/74    Less than 140/90 Yes   Dilated retinal exam : 08/18/2021 Annually Yes   Foot exam   : 06/17/2021 Annually Yes     Review of Systems   Constitutional: Negative for fatigue and unexpected weight change.   Eyes: Negative for visual disturbance.   Endocrine: Negative for polydipsia, polyphagia and polyuria.     Vital Signs  /74 (BP Location: Left arm, Patient Position: Sitting, BP Method: Medium (Manual))   Pulse (!) 114   Ht 5' 2" (1.575 m)   Wt 64.1 kg (141 lb 6.8 oz)   LMP 06/21/2022   SpO2 99%   BMI 25.87 kg/m²     Physical Exam  Vitals reviewed.   Constitutional:       Appearance: She is well-developed.   Pulmonary:      Effort: Pulmonary effort is normal.   Abdominal:      Palpations: Abdomen is soft.     Pump site are without edema or erythema  Denies sores or lesions to feet  DM foot exam done in " June 2021; deferred today      Chemistry        Component Value Date/Time     12/09/2021 0954    K 4.6 12/09/2021 0954     12/09/2021 0954    CO2 22 (L) 12/09/2021 0954    BUN 11 12/09/2021 0954    CREATININE 0.8 12/09/2021 0954     (H) 12/09/2021 0954        Component Value Date/Time    CALCIUM 10.0 12/09/2021 0954    ALKPHOS 47 (L) 12/09/2021 0954    AST 18 12/09/2021 0954    ALT 16 12/09/2021 0954    BILITOT 0.6 12/09/2021 0954    ESTGFRAFRICA >60.0 12/09/2021 0954    EGFRNONAA >60.0 12/09/2021 0954           Lab Results   Component Value Date    MICALBCREAT Unable to calculate 09/09/2021     Lab Results   Component Value Date    CHOL 179 09/09/2021    CHOL 195 09/17/2020    CHOL 203 (H) 12/04/2019     Lab Results   Component Value Date    HDL 63 09/09/2021    HDL 58 09/17/2020    HDL 77 (H) 12/04/2019     Lab Results   Component Value Date    LDLCALC 100.6 09/09/2021    LDLCALC 125.4 09/17/2020    LDLCALC 111.2 12/04/2019     Lab Results   Component Value Date    TRIG 77 09/09/2021    TRIG 58 09/17/2020    TRIG 74 12/04/2019     Lab Results   Component Value Date    CHOLHDL 35.2 09/09/2021    CHOLHDL 29.7 09/17/2020    CHOLHDL 37.9 12/04/2019       Assessment/Plan  Type 1 diabetes mellitus with hypoglycemia and without coma  Type 1 DM with HbA1c of 8.1% however with signicant glycemic variability.     Plan  - Continue Omnipod insulin pump with following changes:    Pump Settings  She is having midday lower end of normal blood sugars and some higher blood sugars while awake. Will change below  She will work on avoiding  Manual bolusing,changing calculator and avoid extended boluses due to fear of low blood sugar.    Basal  MD-4AM: 0.9  4AM-8AM: 0.65  8AM-12P: 0.55  12P-MD: 0.8    ICR 12    Called in Control IQ Dash     - Encouraged use of temp basal while working  - Due for A1c  - Up to date with eye    - Foot exam done in June 2021  - Follow up in 3 months  - has glucagon   - needs  ketone    Insulin pump status  She has had occasional pump site reactions  Continue using OTC Flonase     Hypoglycemia due to type 1 diabetes mellitus  Know how to correct  Will adjust insulin pump settings as above  Glucagon has     Glucagon kit: yes, parents know how to use it    FOLLOW UP  Follow up in about 3 months (around 9/23/2022).       Pump backup plan  If the insulin pump is non functional and discontinued for anticipated more than 20 hours, please give daily injections of:  Long acting insulin 26 units daily  Short acting insulin for meals according to carb ratios and sensitivity factor in the pump.    When the insulin pump is restarted, do not restart basal rates until at least 22 hours after the last long acting insulin injection. You can set a 0% temporary basal setting that will last until this time and use your pump to bolus for meals and correction.    For any technical insulin pump issues, please contact the insulin pump company; the toll free number is printed on the label on the back of the insulin pump.    Labs today and on RTC

## 2022-06-23 NOTE — PATIENT INSTRUCTIONS
Dexcom     She is having midday lower end of normal blood sugars and some higher blood sugars while awake. Will change below    Basal  MD-4AM: 0.9  4AM-8AM: 0.65  8AM-12P: 0.55  12P-MD: 0.8    ICR 12

## 2022-07-25 ENCOUNTER — CLINICAL SUPPORT (OUTPATIENT)
Dept: DIABETES | Facility: CLINIC | Age: 27
End: 2022-07-25
Payer: COMMERCIAL

## 2022-07-25 DIAGNOSIS — E10.649 TYPE 1 DIABETES MELLITUS WITH HYPOGLYCEMIA AND WITHOUT COMA: ICD-10-CM

## 2022-07-25 PROCEDURE — G0108 DIAB MANAGE TRN  PER INDIV: HCPCS | Mod: S$GLB,,,

## 2022-07-25 PROCEDURE — 99999 PR PBB SHADOW E&M-EST. PATIENT-LVL II: CPT | Mod: PBBFAC,,,

## 2022-07-25 PROCEDURE — 99999 PR PBB SHADOW E&M-EST. PATIENT-LVL II: ICD-10-PCS | Mod: PBBFAC,,,

## 2022-07-25 PROCEDURE — G0108 PR DIAB MANAGE TRN  PER INDIV: ICD-10-PCS | Mod: S$GLB,,,

## 2022-07-27 ENCOUNTER — PATIENT MESSAGE (OUTPATIENT)
Dept: DIABETES | Facility: CLINIC | Age: 27
End: 2022-07-27
Payer: COMMERCIAL

## 2022-07-28 VITALS — WEIGHT: 140.31 LBS | BODY MASS INDEX: 25.67 KG/M2

## 2022-07-28 NOTE — PROGRESS NOTES
Diabetes Care Specialist Progress Note  Author: Isis Garcia RN, CDE  Date: 7/25/2022    Program Intake  Reason for Diabetes Program Visit:: Intervention  Type of Intervention:: Individual  Individual: Device Training  Device Training: Insulin Pump Upgrade (Upgrade training to Omnipod 5 from DASH)  Current diabetes risk level:: moderate  In the last 12 months, have you:: none  Permission to speak with others about care:: no    Lab Results   Component Value Date    HGBA1C 8.1 (H) 06/16/2022       Clinical    Problem Review  Reviewed Problem List with Patient: yes  Active comorbidities affecting diabetes self-care.: no  Reviewed health maintenance: yes    Clinical Assessment  Have you ever experienced hypoglycemia (low blood sugar)?: yes  In the last month, how often have you experienced low blood sugar?: more than once a week (<1% low, 0% low)  Have you ever experienced hyperglycemia (high blood sugar)?: yes  In the last month, how often have you experienced high blood sugar?: once a day (36% high, 16% very high)       Additional Social History    Support  Does anyone support you with your diabetes care?:  (pt is primary caregiver)    Access to Mass Media & Technology  Does the patient have access to any of the following devices or technologies?: Smart phone, Internet Access       Health Literacy  Preferred Learning Method: Face to Face, Demonstration, Video, Hands On, Web Based, Reading Materials      Diabetes Self-Management Skills Assessment    Diabetes Disease Process/Treatment Options  Patient/caregiver able to state what happens when someone has diabetes.: yes  Patient/caregiver knows what type of diabetes they have.: yes  Diabetes Type : Type I  Patient/caregiver able to identify at least three signs and symptoms of diabetes.: yes  Diabetes Disease Process/Treatment Options: Skills Assessment Completed: Yes  Assessment indicates:: Adequate understanding  Area of need?: No    Nutrition/Healthy Eating  Method  of carbohydrate measurement:: carb counting/reading labels  Patient can identify foods that impact blood sugar.: yes  Patient-identified foods:: fruit/fruit juice, starches (bread, pasta, rice, cereal), milk, soda, starchy vegetables (corn, peas, beans), sweets, yogurt  Nutrition/Healthy Eating Skills Assessment Completed:: Yes  Assessment indicates:: Adequate understanding  Area of need?: No    Physical Activity/Exercise  Physical Activity/Exercise Skills Assessment Completed: : No  Deffered due to:: Time    Medications  Patient is able to describe current diabetes management routine.: yes  Diabetes management routine:: insulin pump  Patient is able to identify current diabetes medications, dosages, and appropriate timing of medications.: yes  Patient understands the purpose of the medications taken for diabetes.: yes  Patient reports problems or concerns with current medication regimen.: no  Medication Skills Assessment Completed:: Yes  Assessment indicates:: Instruction Needed (Training for new pump)  Area of need?: Yes    Home Blood Glucose Monitoring  Patient states that blood sugar is checked at home daily.: yes  Monitoring Method:: personal continuous glucose monitor  Personal CGM type:: Dexcom G6  Patient is able to use personal CGM appropriately.: yes  CGM Report reviewed?: yes  Home Blood Glucose Monitoring Skills Assessment Completed: : Yes  Assessment indicates:: Adequate understanding  Area of need?: No    Acute Complications  Patient is able to identify types of acute complications: Yes  Patient Identified:: Hypoglycemia  Patient is able to state the basic meaning of hypoglycemia?: Yes  Able to state the blood sugar range for hypoglycemia?: yes  Patient stated range:: less than 70  Patient can identify general symptoms of hypoglycemia: yes  Patient identified:: shakiness  Able to state proper treatment of hypoglycemia?: yes  Patient identified:: 1/2 can soda/fruit juice, 5-6 pieces of hard candy  Acute  Complications Skills Assessment Completed: : Yes  Assessment indicates:: Adequate understanding  Area of need?: No    Chronic Complications  Chronic Complications Skills Assessment Completed: : No  Deferred due to:: Time    Psychosocial/Coping  Psychosocial/Coping Skills Assessment Completed: : No  Deffered due to:: Time      Assessment Summary and Plan    Based on today's diabetes care assessment, the following areas of need were identified:      Social 4/20/2022   Support No   Access to Mass Media/Tech No   Cognitive/Behavioral Health No   Culture/Hoahaoism No   Communication No   Health Literacy No        Clinical 4/20/2022   Medication Adherence Yes   Lab Compliance No   Nutritional Status No        Diabetes Self-Management Skills 7/25/2022   Diabetes Disease Process/Treatment Options No   Nutrition/Healthy Eating No   Medication Yes, see care planning   Home Blood Glucose Monitoring No   Acute Complications No          Today's interventions were provided through individual discussion, instruction, and written materials were provided.      Patient verbalized understanding of instruction and written materials.  Pt was able to return back demonstration of instructions today. Patient understood key points, needs reinforcement and further instruction.     Diabetes Self-Management Care Plan:    Today's Diabetes Self-Management Care Plan was developed with Tammie's input. Tammie has agreed to work toward the following goal(s) to improve his/her overall diabetes control.      Care Plan: Diabetes Management   Updates made since 6/28/2022 12:00 AM      Problem: Medications       Long-Range Goal: Patient will use insulin pump appropriately as instructed: will change pump site every 3 days; will bolus for all carbs 15 minutes prior to eating.    Start Date: 4/20/2022   Expected End Date: 9/29/2022   This Visit's Progress: On track   Priority: High   Barriers: No Barriers Identified   Note:    Fearful of hypo  Pump  changes    Update to care planning 7/25/2022:   Pt switching from Omnipod DASH to new Omnipod 5 with automated insulin delivery.      Already using Dexcom G6 via mobile phone sherry.      Reviewed the following with pt today:     · Details of pump therapy were covered to include basic features of PDM programming, filling of pod / reservoir, automatic pod priming and insertion, setting basal, bolus and other features in the set up menu.  · Pt demonstrated the ability to program PDM and fill pod reservoir with rapid acting insulin, prime infusion set and inserted pod to abdomen    · Instructed on use of basic pump features. Reviewed site selection of pods, rotation of sites and hard stop on PDM to change every 72 hrs + low insulin in resevoir.   · Instructed that insulin vial is good out of refrigeration for 30 days.   · Reviewed treatment of hypoglycemia, hyperglycemia; sick day care, DKA and troubleshooting of pump.  · Omni Pod 24 hour support line provided.   · Patient instructed on Glooko download, OmniPod packet included download instructions.         Written materials provided. Patient/caregiver verbalized understanding of all instructions given.      Settings transferred from old pump.        Basal:                                                                                     Follow Up Plan     To be seen in pump clinic in September 2022    Today's care plan and follow up schedule was discussed with patient.  Tammie verbalized understanding of the care plan, goals, and agrees to follow up plan.        The patient was encouraged to communicate with his/her health care provider/physician and care team regarding his/her condition(s) and treatment.  I provided the patient with my contact information today and encouraged to contact me via phone or [facility] Patient Portal as needed.     Length of Visit   Total Time: 60 Minutes

## 2022-08-08 ENCOUNTER — CLINICAL SUPPORT (OUTPATIENT)
Dept: DIABETES | Facility: CLINIC | Age: 27
End: 2022-08-08
Payer: COMMERCIAL

## 2022-08-08 DIAGNOSIS — E10.649 TYPE 1 DIABETES MELLITUS WITH HYPOGLYCEMIA AND WITHOUT COMA: ICD-10-CM

## 2022-08-08 PROCEDURE — 99999 PR PBB SHADOW E&M-EST. PATIENT-LVL I: CPT | Mod: PBBFAC,,,

## 2022-08-08 PROCEDURE — G0108 DIAB MANAGE TRN  PER INDIV: HCPCS | Mod: S$GLB,,,

## 2022-08-08 PROCEDURE — G0108 PR DIAB MANAGE TRN  PER INDIV: ICD-10-PCS | Mod: S$GLB,,,

## 2022-08-08 PROCEDURE — 99999 PR PBB SHADOW E&M-EST. PATIENT-LVL I: ICD-10-PCS | Mod: PBBFAC,,,

## 2022-08-09 NOTE — PROGRESS NOTES
Diabetes Care Specialist Progress Note  Author: Isis Garcia RN, CDE  Date: 8/8/2022    Program Intake  Reason for Diabetes Program Visit:: Intervention  Type of Intervention:: Individual  Individual: Device Training  Education: Other  Device Training: Insulin Pump Upgrade  Current diabetes risk level:: moderate  In the last 12 months, have you:: none  Permission to speak with others about care:: no    Lab Results   Component Value Date    HGBA1C 8.1 (H) 06/16/2022       Clinical    Problem Review  Reviewed Problem List with Patient: yes  Active comorbidities affecting diabetes self-care.: no  Reviewed health maintenance: yes    Clinical Assessment  Have you ever experienced hypoglycemia (low blood sugar)?: yes  In the last month, how often have you experienced low blood sugar?: more than once a week (<1% low, 0% low)  Have you ever experienced hyperglycemia (high blood sugar)?: yes  In the last month, how often have you experienced high blood sugar?: once a day (36% high, 16% very high)    Additional Social History    Support  Does anyone support you with your diabetes care?:  (pt is primary caregiver)    Access to [employer] Technology  Does the patient have access to any of the following devices or technologies?: Smart phone, Internet Access    Health Literacy  Preferred Learning Method: Face to Face, Demonstration, Video, Hands On, Web Based, Reading Materials      Diabetes Self-Management Skills Assessment    Diabetes Disease Process/Treatment Options  Patient/caregiver able to state what happens when someone has diabetes.: yes  Patient/caregiver knows what type of diabetes they have.: yes  Diabetes Type : Type I  Patient/caregiver able to identify at least three signs and symptoms of diabetes.: yes  Assessment indicates:: Adequate understanding  Area of need?: No    Nutrition/Healthy Eating  Method of carbohydrate measurement:: carb counting/reading labels  Patient can identify foods that impact blood  sugar.: yes  Patient-identified foods:: fruit/fruit juice, starches (bread, pasta, rice, cereal), milk, soda, starchy vegetables (corn, peas, beans), sweets, yogurt  Assessment indicates:: Adequate understanding  Area of need?: No    Physical Activity/Exercise  Deffered due to:: Time    Medications  Patient is able to describe current diabetes management routine.: yes  Diabetes management routine:: insulin pump  Patient is able to identify current diabetes medications, dosages, and appropriate timing of medications.: yes  Patient understands the purpose of the medications taken for diabetes.: yes  Patient reports problems or concerns with current medication regimen.: no  Assessment indicates:: Instruction Needed (Training for new pump)  Area of need?: Yes    Home Blood Glucose Monitoring  Patient states that blood sugar is checked at home daily.: yes  Monitoring Method:: personal continuous glucose monitor  Personal CGM type:: Dexcom G6  Patient is able to use personal CGM appropriately.: yes  Assessment indicates:: Adequate understanding  Area of need?: No    Acute Complications  Able to state the blood sugar range for hypoglycemia?: yes  Patient can identify general symptoms of hypoglycemia: yes  Patient identified:: shakiness  Able to state proper treatment of hypoglycemia?: yes  Patient identified:: 1/2 can soda/fruit juice, 5-6 pieces of hard candy  Assessment indicates:: Adequate understanding  Area of need?: No    Chronic Complications  Deferred due to:: Time    Psychosocial/Coping  Deffered due to:: Time      Diabetes Self Support Plan         Assessment Summary and Plan    Based on today's diabetes care assessment, the following areas of need were identified:      Social 4/20/2022   Support No   Access to Mass Media/Tech No   Cognitive/Behavioral Health No   Culture/Uatsdin No   Communication No   Health Literacy No        Clinical 4/20/2022   Medication Adherence Yes   Lab Compliance No   Nutritional  Status No        Diabetes Self-Management Skills 8/8/2022   Diabetes Disease Process/Treatment Options No   Nutrition/Healthy Eating No   Medication Yes, see care planning   Home Blood Glucose Monitoring No   Acute Complications No          Today's interventions were provided through individual discussion, instruction, and written materials were provided.      Patient verbalized understanding of instruction and written materials.  Pt was able to return back demonstration of instructions today. Patient understood key points, needs reinforcement and further instruction.     Diabetes Self-Management Care Plan:    Today's Diabetes Self-Management Care Plan was developed with Tammie's input. Tammie has agreed to work toward the following goal(s) to improve his/her overall diabetes control.      Care Plan: Diabetes Management   Updates made since 7/10/2022 12:00 AM      Problem: Medications       Long-Range Goal: Patient will use insulin pump appropriately as instructed: will change pump site every 3 days; will bolus for all carbs 15 minutes prior to eating.    Start Date: 4/20/2022   Expected End Date: 9/29/2022   Recent Progress: On track   Priority: High   Barriers: No Barriers Identified   Note:    Fearful of hypo  Pump changes    Update to care planning 7/25/2022:   Pt switching from Omnipod DASH to new Omnipod 5 with automated insulin delivery.      Already using Dexcom G6 via mobile phone sherry.      Reviewed the following with pt today:     · Details of pump therapy were covered to include basic features of PDM programming, filling of pod / reservoir, automatic pod priming and insertion, setting basal, bolus and other features in the set up menu.  · Pt demonstrated the ability to program PDM and fill pod reservoir with rapid acting insulin, prime infusion set and inserted pod to abdomen    · Instructed on use of basic pump features. Reviewed site selection of pods, rotation of sites and hard stop on PDM to change  every 72 hrs + low insulin in resevoir.   · Instructed that insulin vial is good out of refrigeration for 30 days.   · Reviewed treatment of hypoglycemia, hyperglycemia; sick day care, DKA and troubleshooting of pump.  · Omni Pod 24 hour support line provided.   · Patient instructed on Glooko download, OmniPod packet included download instructions.         Written materials provided. Patient/caregiver verbalized understanding of all instructions given.      Settings transferred from old pump.        Basal:                                                                            Update to care planning 8/8/2022:  Post Omnipod 5 f/u.  Patient doing very well.  Her pump download was reviewed.  Noted to have post meal excursions into the 250 range.  She tends to eat small meals frequently.  Rare hypos noted in which basal rate was paused.  Will change her IC ratio from 12 to 11.  Patient wishes to keep her current BG target at 130.  Patient verbalized understanding. No other question.          Follow Up Plan     Patient to call    Today's care plan and follow up schedule was discussed with patient.  Tammie verbalized understanding of the care plan, goals, and agrees to follow up plan.        The patient was encouraged to communicate with his/her health care provider/physician and care team regarding his/her condition(s) and treatment.  I provided the patient with my contact information today and encouraged to contact me via phone or [facility] Patient Portal as needed.     Length of Visit   Total Time: 30 Minutes

## 2022-09-16 ENCOUNTER — PATIENT MESSAGE (OUTPATIENT)
Dept: ENDOCRINOLOGY | Facility: CLINIC | Age: 27
End: 2022-09-16
Payer: COMMERCIAL

## 2022-09-27 ENCOUNTER — LAB VISIT (OUTPATIENT)
Dept: LAB | Facility: HOSPITAL | Age: 27
End: 2022-09-27
Attending: PEDIATRICS
Payer: COMMERCIAL

## 2022-09-27 DIAGNOSIS — E10.649 TYPE 1 DIABETES MELLITUS WITH HYPOGLYCEMIA AND WITHOUT COMA: ICD-10-CM

## 2022-09-27 LAB
ESTIMATED AVG GLUCOSE: 171 MG/DL (ref 68–131)
HBA1C MFR BLD: 7.6 % (ref 4–5.6)

## 2022-09-27 PROCEDURE — 83036 HEMOGLOBIN GLYCOSYLATED A1C: CPT | Performed by: NURSE PRACTITIONER

## 2022-09-27 PROCEDURE — 36415 COLL VENOUS BLD VENIPUNCTURE: CPT | Mod: PN | Performed by: NURSE PRACTITIONER

## 2022-09-28 ENCOUNTER — OFFICE VISIT (OUTPATIENT)
Dept: OPTOMETRY | Facility: CLINIC | Age: 27
End: 2022-09-28
Payer: COMMERCIAL

## 2022-09-28 DIAGNOSIS — E10.9 TYPE 1 DIABETES MELLITUS WITHOUT COMPLICATION: Primary | ICD-10-CM

## 2022-09-28 PROCEDURE — 92014 COMPRE OPH EXAM EST PT 1/>: CPT | Mod: S$GLB,,, | Performed by: OPTOMETRIST

## 2022-09-28 PROCEDURE — 92015 PR REFRACTION: ICD-10-PCS | Mod: S$GLB,,, | Performed by: OPTOMETRIST

## 2022-09-28 PROCEDURE — 92014 PR EYE EXAM, EST PATIENT,COMPREHESV: ICD-10-PCS | Mod: S$GLB,,, | Performed by: OPTOMETRIST

## 2022-09-28 PROCEDURE — 3051F HG A1C>EQUAL 7.0%<8.0%: CPT | Mod: CPTII,S$GLB,, | Performed by: OPTOMETRIST

## 2022-09-28 PROCEDURE — 92015 DETERMINE REFRACTIVE STATE: CPT | Mod: S$GLB,,, | Performed by: OPTOMETRIST

## 2022-09-28 PROCEDURE — 1159F MED LIST DOCD IN RCRD: CPT | Mod: CPTII,S$GLB,, | Performed by: OPTOMETRIST

## 2022-09-28 PROCEDURE — 3051F PR MOST RECENT HEMOGLOBIN A1C LEVEL 7.0 - < 8.0%: ICD-10-PCS | Mod: CPTII,S$GLB,, | Performed by: OPTOMETRIST

## 2022-09-28 PROCEDURE — 1159F PR MEDICATION LIST DOCUMENTED IN MEDICAL RECORD: ICD-10-PCS | Mod: CPTII,S$GLB,, | Performed by: OPTOMETRIST

## 2022-09-28 PROCEDURE — 99999 PR PBB SHADOW E&M-EST. PATIENT-LVL III: CPT | Mod: PBBFAC,,, | Performed by: OPTOMETRIST

## 2022-09-28 PROCEDURE — 99999 PR PBB SHADOW E&M-EST. PATIENT-LVL III: ICD-10-PCS | Mod: PBBFAC,,, | Performed by: OPTOMETRIST

## 2022-09-28 NOTE — PROGRESS NOTES
HPI    Tammie Gomez is a 27 y.o. female who returns for continued diabetic eye   care. Her last exam with me was on 08/18/2021. She was diagnosed with Type   1 DM around age 5. It is controlled with an insulin pump and CGM. Her   current blood sugar is 241. Tammie reports occasional blurry vision when   driving. No other ocular concerns to report at this time.    Hemoglobin A1C       Date                     Value               Ref Range             Status                09/27/2022               7.6 (H)             4.0 - 5.6 %           Final                 06/16/2022               8.1 (H)             4.0 - 5.6 %           Final                 03/10/2022               7.6 (H)             4.0 - 5.6 %           Final                (+)blurred vision  (--)Headaches  (--)diplopia  (--)flashes  (--)floaters  (--)pain  (--)Itching  (--)tearing  (--)burning  (--)Dryness  (--) OTC Drops  (--)Photophobia      Last edited by Peter Ocasio, OD on 9/28/2022  2:34 PM.        Review of Systems   Constitutional:  Negative for chills, fever and malaise/fatigue.   HENT:  Negative for congestion, hearing loss and sore throat.    Eyes:  Negative for blurred vision, double vision, photophobia, pain, discharge and redness.   Respiratory: Negative.  Negative for cough, shortness of breath and wheezing.    Cardiovascular: Negative.    Gastrointestinal: Negative.  Negative for nausea and vomiting.   Genitourinary: Negative.    Musculoskeletal: Negative.    Skin: Negative.    Neurological:  Negative for seizures.   Psychiatric/Behavioral: Negative.       For exam results, see encounter report    Assessment /Plan     1. Type 1 diabetes mellitus without complication  -           No ocular  treatment needed; monitor annually     2. Minimal bilateral myopia --> stable; now symptomatic  -           Spec Rx per final Rx below for distance only  Glasses Prescription (9/28/2022)          Sphere Cylinder    Right [place] Sphere    Left -0.25  Sphere      Type: SVL    Expiration Date: 9/28/2023            Parent & Patient education; RTC in 1 year with DFE; Ok to instill 1% Tropicamide after (normal) baseline workup, sooner as needed

## 2022-09-29 ENCOUNTER — OFFICE VISIT (OUTPATIENT)
Dept: ENDOCRINOLOGY | Facility: CLINIC | Age: 27
End: 2022-09-29
Payer: COMMERCIAL

## 2022-09-29 VITALS
BODY MASS INDEX: 25.52 KG/M2 | HEART RATE: 116 BPM | SYSTOLIC BLOOD PRESSURE: 122 MMHG | DIASTOLIC BLOOD PRESSURE: 74 MMHG | HEIGHT: 62 IN | WEIGHT: 138.69 LBS | OXYGEN SATURATION: 98 %

## 2022-09-29 DIAGNOSIS — E10.649 TYPE 1 DIABETES MELLITUS WITH HYPOGLYCEMIA AND WITHOUT COMA: Primary | ICD-10-CM

## 2022-09-29 DIAGNOSIS — Z96.41 INSULIN PUMP STATUS: ICD-10-CM

## 2022-09-29 PROCEDURE — 3074F PR MOST RECENT SYSTOLIC BLOOD PRESSURE < 130 MM HG: ICD-10-PCS | Mod: CPTII,S$GLB,, | Performed by: INTERNAL MEDICINE

## 2022-09-29 PROCEDURE — 3051F PR MOST RECENT HEMOGLOBIN A1C LEVEL 7.0 - < 8.0%: ICD-10-PCS | Mod: CPTII,S$GLB,, | Performed by: INTERNAL MEDICINE

## 2022-09-29 PROCEDURE — 3008F PR BODY MASS INDEX (BMI) DOCUMENTED: ICD-10-PCS | Mod: CPTII,S$GLB,, | Performed by: INTERNAL MEDICINE

## 2022-09-29 PROCEDURE — 3051F HG A1C>EQUAL 7.0%<8.0%: CPT | Mod: CPTII,S$GLB,, | Performed by: INTERNAL MEDICINE

## 2022-09-29 PROCEDURE — 3008F BODY MASS INDEX DOCD: CPT | Mod: CPTII,S$GLB,, | Performed by: INTERNAL MEDICINE

## 2022-09-29 PROCEDURE — 3078F DIAST BP <80 MM HG: CPT | Mod: CPTII,S$GLB,, | Performed by: INTERNAL MEDICINE

## 2022-09-29 PROCEDURE — 99213 OFFICE O/P EST LOW 20 MIN: CPT | Mod: S$GLB,,, | Performed by: INTERNAL MEDICINE

## 2022-09-29 PROCEDURE — 3078F PR MOST RECENT DIASTOLIC BLOOD PRESSURE < 80 MM HG: ICD-10-PCS | Mod: CPTII,S$GLB,, | Performed by: INTERNAL MEDICINE

## 2022-09-29 PROCEDURE — 1160F PR REVIEW ALL MEDS BY PRESCRIBER/CLIN PHARMACIST DOCUMENTED: ICD-10-PCS | Mod: CPTII,S$GLB,, | Performed by: INTERNAL MEDICINE

## 2022-09-29 PROCEDURE — 95251 PR GLUCOSE MONITOR, 72 HOUR, PHYS INTERP: ICD-10-PCS | Mod: S$GLB,,, | Performed by: STUDENT IN AN ORGANIZED HEALTH CARE EDUCATION/TRAINING PROGRAM

## 2022-09-29 PROCEDURE — 1159F PR MEDICATION LIST DOCUMENTED IN MEDICAL RECORD: ICD-10-PCS | Mod: CPTII,S$GLB,, | Performed by: INTERNAL MEDICINE

## 2022-09-29 PROCEDURE — 99999 PR PBB SHADOW E&M-EST. PATIENT-LVL V: CPT | Mod: PBBFAC,,,

## 2022-09-29 PROCEDURE — 1160F RVW MEDS BY RX/DR IN RCRD: CPT | Mod: CPTII,S$GLB,, | Performed by: INTERNAL MEDICINE

## 2022-09-29 PROCEDURE — 3074F SYST BP LT 130 MM HG: CPT | Mod: CPTII,S$GLB,, | Performed by: INTERNAL MEDICINE

## 2022-09-29 PROCEDURE — 95251 CONT GLUC MNTR ANALYSIS I&R: CPT | Mod: S$GLB,,, | Performed by: STUDENT IN AN ORGANIZED HEALTH CARE EDUCATION/TRAINING PROGRAM

## 2022-09-29 PROCEDURE — 99999 PR PBB SHADOW E&M-EST. PATIENT-LVL V: ICD-10-PCS | Mod: PBBFAC,,,

## 2022-09-29 PROCEDURE — 1159F MED LIST DOCD IN RCRD: CPT | Mod: CPTII,S$GLB,, | Performed by: INTERNAL MEDICINE

## 2022-09-29 PROCEDURE — 99213 PR OFFICE/OUTPT VISIT, EST, LEVL III, 20-29 MIN: ICD-10-PCS | Mod: S$GLB,,, | Performed by: INTERNAL MEDICINE

## 2022-09-29 NOTE — ASSESSMENT & PLAN NOTE
We have reviewed the insulin pump and CGM data.  Tammie will work on accurately inputting carbs into the pump.  For correcting with oral intake of juice we would recommend avoiding correcting too soon and instead wait until you are around 100 before considering correcting with oral intake.  Explained that the device itself with also work on suspending the basal insulin if blood sugars fall. She was encouraged to try Flonase under the skin for the Dexcom device placement areas to help avoid irritation.    She is up to date on eye exam and had a foot exam today.  Will plan A1C before next visit and she will follow up in pump clinic in 3 months.

## 2022-09-29 NOTE — PATIENT INSTRUCTIONS
- Please work on accurately inputting carbs into the device.  For correcting with oral intake of juice we would recommend avoiding correcting too soon and instead wait until you are around 100.  The device itself with also work on suspending the basal insulin if blood sugars fall.  If you bolus for carbs up front correctly this will help to take care of the highs that you have been having.  Try Flonase under the skin for the Dexcom device placement areas to help avoid irritation. Reach out if other questions arise.        - Per our conversation your insulin pump settings were changed as outlined below:  This will include adjustment to your carb ratio.      Pump: Omnipod 5 with Humalog insulin   Pump Settings  Basal Rate (change in bold)  12A:    0.9 u/hr  4A:      0.65 u/hr  8A:      0.55 u/hr  12P:    0.8 u/hr      Carb Ratio  12A:    1:11 (was 1:12)     ISF  12A:    1:65  12P:    1:60     Target: 130     IAT: 4 hrs    - Please bolus insulin 15 minutes before you eat meals to avoid high spikes in blood glucose. Likewise, please bolus with every snack outside of daily meals    - Please remember to change insulin pump set/pump site, refill reservoir every 3 days. Longer duration between pump site changes can cause issues with insulin infusion and result in high blood glucose    - If glucose sensor malfunctions, check glucose by finger sticks and manually input the glucose levels into the pump    - Contact supplier if you are having sensor or pump issues for troubleshooting and additional supplies      Backup plan in case of pump failure   - If the insulin pump is non functional and discontinued for anticipated more than 20 hours, give daily injections of:  - Long acting insulin  23  units daily  - Short acting insulin for meals according to carb ratios and sensitivity factor in the pump    - When the insulin pump is restarted, do not restart basal rates until at least 22 hours after the last long acting insulin  injection. You can set a 0% temporary basal setting that will last until this time and use your pump to bolus for meals and correction for high glucose    - For any technical insulin pump issues, please contact the insulin pump company; the toll free number is printed on the label on the back of the insulin pump    Foot Care  - Good nutrition and tight blood sugar control will help prevent nerve damage and potential infection to your feet. Wear proper shoes that do not rub or irritate the feet and provide plenty of padding, inspect your feet daily for skin breaks, blisters, swelling, or redness, never walk without protective shoe gear, change socks every day, never use sharp instruments on feet, visit your podiatrist regularly for appropriate maintenance  - Do not use heating pads or warm/hot water soaks as these can cause burns and predispose you to infection when you have diminished sensation in your feet    Eye Care  - See an eye doctor at least once a year to screen for and treat diabetic retinopathy (eye damage), which can show signs on examination even before it affects your vision    - Thank you for visiting us today, we will plan another follow-up visit to review how the new settings are working for you

## 2022-09-29 NOTE — ASSESSMENT & PLAN NOTE
- Per our conversation your insulin pump settings were changed as outlined below:  This will include adjustment to your carb ratio.      Pump: Omnipod 5 with Humalog insulin   Pump Settings  Basal Rate (change in bold)  12A:    0.9 u/hr  4A:      0.65 u/hr  8A:      0.55 u/hr  12P:    0.8 u/hr      Carb Ratio  12A:    1:11 (was 1:12)     ISF  12A:    1:65  12P:    1:60     Target: 130     IAT: 4 hrs

## 2022-09-30 NOTE — PROGRESS NOTES
I have reviewed and concur with Dr. Hall's history, physical, assessment, and plan.  I have personally interviewed and examined the patient.    Has changed to Omnipod 5 and likes the pump. Still does not trust that will not have lows so underbolusing with meals. We reviewed that pump cannot prevent all lows but does help prevent severe and encouraged to bolus for all carbs. Can leave target at 130 to give some room for overcorrection. She is open to this    Giana Roman MD

## 2022-11-04 ENCOUNTER — TELEPHONE (OUTPATIENT)
Dept: PHARMACY | Facility: CLINIC | Age: 27
End: 2022-11-04
Payer: COMMERCIAL

## 2022-11-04 NOTE — TELEPHONE ENCOUNTER
DOCUMENTATION ONLY  Omnipod 5 G6 Pod (Gen 5)  Approval date: 11/3/2022 to 12/30/2099  Case ID# [id number]

## 2022-11-30 ENCOUNTER — PATIENT MESSAGE (OUTPATIENT)
Dept: OBSTETRICS AND GYNECOLOGY | Facility: CLINIC | Age: 27
End: 2022-11-30
Payer: COMMERCIAL

## 2022-12-08 ENCOUNTER — PATIENT MESSAGE (OUTPATIENT)
Dept: ENDOCRINOLOGY | Facility: CLINIC | Age: 27
End: 2022-12-08
Payer: COMMERCIAL

## 2022-12-08 RX ORDER — LEVONORGESTREL AND ETHINYL ESTRADIOL 0.1-0.02MG
KIT ORAL
Qty: 84 TABLET | Refills: 4 | Status: SHIPPED | OUTPATIENT
Start: 2022-12-08

## 2022-12-09 ENCOUNTER — PATIENT MESSAGE (OUTPATIENT)
Dept: ENDOCRINOLOGY | Facility: CLINIC | Age: 27
End: 2022-12-09
Payer: COMMERCIAL

## 2022-12-15 ENCOUNTER — OFFICE VISIT (OUTPATIENT)
Dept: OBSTETRICS AND GYNECOLOGY | Facility: CLINIC | Age: 27
End: 2022-12-15
Payer: COMMERCIAL

## 2022-12-15 VITALS
WEIGHT: 132.5 LBS | DIASTOLIC BLOOD PRESSURE: 78 MMHG | BODY MASS INDEX: 24.38 KG/M2 | HEIGHT: 62 IN | SYSTOLIC BLOOD PRESSURE: 132 MMHG

## 2022-12-15 DIAGNOSIS — Z30.9 ENCOUNTER FOR CONTRACEPTIVE MANAGEMENT, UNSPECIFIED TYPE: ICD-10-CM

## 2022-12-15 DIAGNOSIS — Z01.419 WELL WOMAN EXAM WITH ROUTINE GYNECOLOGICAL EXAM: Primary | ICD-10-CM

## 2022-12-15 PROCEDURE — 3078F DIAST BP <80 MM HG: CPT | Mod: CPTII,S$GLB,, | Performed by: STUDENT IN AN ORGANIZED HEALTH CARE EDUCATION/TRAINING PROGRAM

## 2022-12-15 PROCEDURE — 1159F PR MEDICATION LIST DOCUMENTED IN MEDICAL RECORD: ICD-10-PCS | Mod: CPTII,S$GLB,, | Performed by: STUDENT IN AN ORGANIZED HEALTH CARE EDUCATION/TRAINING PROGRAM

## 2022-12-15 PROCEDURE — 99395 PREV VISIT EST AGE 18-39: CPT | Mod: S$GLB,,, | Performed by: STUDENT IN AN ORGANIZED HEALTH CARE EDUCATION/TRAINING PROGRAM

## 2022-12-15 PROCEDURE — 3078F PR MOST RECENT DIASTOLIC BLOOD PRESSURE < 80 MM HG: ICD-10-PCS | Mod: CPTII,S$GLB,, | Performed by: STUDENT IN AN ORGANIZED HEALTH CARE EDUCATION/TRAINING PROGRAM

## 2022-12-15 PROCEDURE — 1159F MED LIST DOCD IN RCRD: CPT | Mod: CPTII,S$GLB,, | Performed by: STUDENT IN AN ORGANIZED HEALTH CARE EDUCATION/TRAINING PROGRAM

## 2022-12-15 PROCEDURE — 3075F PR MOST RECENT SYSTOLIC BLOOD PRESS GE 130-139MM HG: ICD-10-PCS | Mod: CPTII,S$GLB,, | Performed by: STUDENT IN AN ORGANIZED HEALTH CARE EDUCATION/TRAINING PROGRAM

## 2022-12-15 PROCEDURE — 3051F PR MOST RECENT HEMOGLOBIN A1C LEVEL 7.0 - < 8.0%: ICD-10-PCS | Mod: CPTII,S$GLB,, | Performed by: STUDENT IN AN ORGANIZED HEALTH CARE EDUCATION/TRAINING PROGRAM

## 2022-12-15 PROCEDURE — 3051F HG A1C>EQUAL 7.0%<8.0%: CPT | Mod: CPTII,S$GLB,, | Performed by: STUDENT IN AN ORGANIZED HEALTH CARE EDUCATION/TRAINING PROGRAM

## 2022-12-15 PROCEDURE — 3008F BODY MASS INDEX DOCD: CPT | Mod: CPTII,S$GLB,, | Performed by: STUDENT IN AN ORGANIZED HEALTH CARE EDUCATION/TRAINING PROGRAM

## 2022-12-15 PROCEDURE — 3075F SYST BP GE 130 - 139MM HG: CPT | Mod: CPTII,S$GLB,, | Performed by: STUDENT IN AN ORGANIZED HEALTH CARE EDUCATION/TRAINING PROGRAM

## 2022-12-15 PROCEDURE — 3008F PR BODY MASS INDEX (BMI) DOCUMENTED: ICD-10-PCS | Mod: CPTII,S$GLB,, | Performed by: STUDENT IN AN ORGANIZED HEALTH CARE EDUCATION/TRAINING PROGRAM

## 2022-12-15 PROCEDURE — 99395 PR PREVENTIVE VISIT,EST,18-39: ICD-10-PCS | Mod: S$GLB,,, | Performed by: STUDENT IN AN ORGANIZED HEALTH CARE EDUCATION/TRAINING PROGRAM

## 2022-12-15 NOTE — PATIENT INSTRUCTIONS
.Persons nine to 18 years of age: 1,300 mg of calcium, 600 IU of vitamin D  Persons 19 to 50 years of age: 1,000 mg of calcium, 600 IU of vitamin D  Persons 51 to 70 years of age: 1,200 mg of calcium, 600 IU of vitamin D  Persons 71 years and older: 1,200 mg of calcium, 800 IU of vitamin D

## 2022-12-24 ENCOUNTER — PATIENT MESSAGE (OUTPATIENT)
Dept: ENDOCRINOLOGY | Facility: CLINIC | Age: 27
End: 2022-12-24
Payer: COMMERCIAL

## 2022-12-27 DIAGNOSIS — E10.649 TYPE 1 DIABETES MELLITUS WITH HYPOGLYCEMIA AND WITHOUT COMA: ICD-10-CM

## 2022-12-27 RX ORDER — BLOOD-GLUCOSE SENSOR
3 EACH MISCELLANEOUS CONTINUOUS
Qty: 3 EACH | Status: SHIPPED | OUTPATIENT
Start: 2022-12-27 | End: 2024-01-12 | Stop reason: SDUPTHER

## 2023-01-01 DIAGNOSIS — Z30.09 ENCOUNTER FOR GENERAL COUNSELING AND ADVICE ON CONTRACEPTIVE MANAGEMENT: Primary | ICD-10-CM

## 2023-01-03 ENCOUNTER — PATIENT MESSAGE (OUTPATIENT)
Dept: OBSTETRICS AND GYNECOLOGY | Facility: CLINIC | Age: 28
End: 2023-01-03
Payer: COMMERCIAL

## 2023-01-03 ENCOUNTER — LAB VISIT (OUTPATIENT)
Dept: LAB | Facility: OTHER | Age: 28
End: 2023-01-03
Attending: STUDENT IN AN ORGANIZED HEALTH CARE EDUCATION/TRAINING PROGRAM
Payer: COMMERCIAL

## 2023-01-03 DIAGNOSIS — E10.649 TYPE 1 DIABETES MELLITUS WITH HYPOGLYCEMIA AND WITHOUT COMA: ICD-10-CM

## 2023-01-03 LAB
ESTIMATED AVG GLUCOSE: 163 MG/DL (ref 68–131)
HBA1C MFR BLD: 7.3 % (ref 4–5.6)

## 2023-01-03 PROCEDURE — 36415 COLL VENOUS BLD VENIPUNCTURE: CPT | Performed by: STUDENT IN AN ORGANIZED HEALTH CARE EDUCATION/TRAINING PROGRAM

## 2023-01-03 PROCEDURE — 83036 HEMOGLOBIN GLYCOSYLATED A1C: CPT | Performed by: STUDENT IN AN ORGANIZED HEALTH CARE EDUCATION/TRAINING PROGRAM

## 2023-01-04 ENCOUNTER — PATIENT MESSAGE (OUTPATIENT)
Dept: OBSTETRICS AND GYNECOLOGY | Facility: CLINIC | Age: 28
End: 2023-01-04
Payer: COMMERCIAL

## 2023-01-04 ENCOUNTER — PATIENT MESSAGE (OUTPATIENT)
Dept: ENDOCRINOLOGY | Facility: CLINIC | Age: 28
End: 2023-01-04
Payer: COMMERCIAL

## 2023-01-05 ENCOUNTER — OFFICE VISIT (OUTPATIENT)
Dept: ENDOCRINOLOGY | Facility: CLINIC | Age: 28
End: 2023-01-05
Payer: COMMERCIAL

## 2023-01-05 VITALS
DIASTOLIC BLOOD PRESSURE: 82 MMHG | BODY MASS INDEX: 24.14 KG/M2 | WEIGHT: 131.19 LBS | HEART RATE: 101 BPM | OXYGEN SATURATION: 99 % | SYSTOLIC BLOOD PRESSURE: 118 MMHG | HEIGHT: 62 IN

## 2023-01-05 DIAGNOSIS — Z96.41 INSULIN PUMP STATUS: ICD-10-CM

## 2023-01-05 DIAGNOSIS — E10.9 TYPE 1 DIABETES MELLITUS WITHOUT COMPLICATION: Primary | ICD-10-CM

## 2023-01-05 DIAGNOSIS — E10.649 TYPE 1 DIABETES MELLITUS WITH HYPOGLYCEMIA AND WITHOUT COMA: ICD-10-CM

## 2023-01-05 PROCEDURE — 3051F HG A1C>EQUAL 7.0%<8.0%: CPT | Mod: CPTII,S$GLB,, | Performed by: INTERNAL MEDICINE

## 2023-01-05 PROCEDURE — 3008F BODY MASS INDEX DOCD: CPT | Mod: CPTII,S$GLB,, | Performed by: INTERNAL MEDICINE

## 2023-01-05 PROCEDURE — 3074F PR MOST RECENT SYSTOLIC BLOOD PRESSURE < 130 MM HG: ICD-10-PCS | Mod: CPTII,S$GLB,, | Performed by: INTERNAL MEDICINE

## 2023-01-05 PROCEDURE — 3079F PR MOST RECENT DIASTOLIC BLOOD PRESSURE 80-89 MM HG: ICD-10-PCS | Mod: CPTII,S$GLB,, | Performed by: INTERNAL MEDICINE

## 2023-01-05 PROCEDURE — 95251 PR GLUCOSE MONITOR, 72 HOUR, PHYS INTERP: ICD-10-PCS | Mod: S$GLB,,, | Performed by: INTERNAL MEDICINE

## 2023-01-05 PROCEDURE — 1159F MED LIST DOCD IN RCRD: CPT | Mod: CPTII,S$GLB,, | Performed by: INTERNAL MEDICINE

## 2023-01-05 PROCEDURE — 3008F PR BODY MASS INDEX (BMI) DOCUMENTED: ICD-10-PCS | Mod: CPTII,S$GLB,, | Performed by: INTERNAL MEDICINE

## 2023-01-05 PROCEDURE — 3051F PR MOST RECENT HEMOGLOBIN A1C LEVEL 7.0 - < 8.0%: ICD-10-PCS | Mod: CPTII,S$GLB,, | Performed by: INTERNAL MEDICINE

## 2023-01-05 PROCEDURE — 1160F RVW MEDS BY RX/DR IN RCRD: CPT | Mod: CPTII,S$GLB,, | Performed by: INTERNAL MEDICINE

## 2023-01-05 PROCEDURE — 1159F PR MEDICATION LIST DOCUMENTED IN MEDICAL RECORD: ICD-10-PCS | Mod: CPTII,S$GLB,, | Performed by: INTERNAL MEDICINE

## 2023-01-05 PROCEDURE — 99999 PR PBB SHADOW E&M-EST. PATIENT-LVL IV: CPT | Mod: PBBFAC,,,

## 2023-01-05 PROCEDURE — 3079F DIAST BP 80-89 MM HG: CPT | Mod: CPTII,S$GLB,, | Performed by: INTERNAL MEDICINE

## 2023-01-05 PROCEDURE — 99999 PR PBB SHADOW E&M-EST. PATIENT-LVL IV: ICD-10-PCS | Mod: PBBFAC,,,

## 2023-01-05 PROCEDURE — 99213 PR OFFICE/OUTPT VISIT, EST, LEVL III, 20-29 MIN: ICD-10-PCS | Mod: 25,S$GLB,, | Performed by: INTERNAL MEDICINE

## 2023-01-05 PROCEDURE — 95251 CONT GLUC MNTR ANALYSIS I&R: CPT | Mod: S$GLB,,, | Performed by: INTERNAL MEDICINE

## 2023-01-05 PROCEDURE — 99213 OFFICE O/P EST LOW 20 MIN: CPT | Mod: 25,S$GLB,, | Performed by: INTERNAL MEDICINE

## 2023-01-05 PROCEDURE — 3074F SYST BP LT 130 MM HG: CPT | Mod: CPTII,S$GLB,, | Performed by: INTERNAL MEDICINE

## 2023-01-05 PROCEDURE — 1160F PR REVIEW ALL MEDS BY PRESCRIBER/CLIN PHARMACIST DOCUMENTED: ICD-10-PCS | Mod: CPTII,S$GLB,, | Performed by: INTERNAL MEDICINE

## 2023-01-05 NOTE — PROGRESS NOTES
Pump Clinic Return Visit  01/05/2023    Tammie Gomez is a 27 y.o.female presenting for follow-up of type 1 DM.    Diagnosed in 1999    Last visit in pump clinic was 9/29/2022. At that time she was underbolusing due to fear of lows. We reviewed that pump cannot prevent all lows but does help prevent severe and encouraged to bolus for all carbs. Left target at 130 to give some room for overcorrection.         Interval History:  Works as a  and admits to not eating much during the day and as a result she will have more food in the evening.  She goes into work around 4 pm and works 7 hours (works 4-5 days/wk).   She is timing her boluses either 5 mins prior or right when she is eating.  She has not had any issues with low blood sugars.  Her  broke earlier this week and she has been using the older Omnipod device.  Replacement  is in the mail today.  She had issues with migraines since her last visit but has not had recurrence of this.  She also has noticed some higher glucose readings in the days leading up to her menstrual cycles.      Current diabetes regimen:  Pump: Omnipod 5 with Humalog insulin  Pump Settings  Basal Rate  12A:    0.9 u/hr  4A:      0.65 u/hr  8A:      0.55 u/hr  12P:    0.8 u/hr     Carb Ratio  12A:    1:11    ISF  12A:    1:65  12P:    1:60    Target: 130    IAT: 4 hrs    TDD  27 units  Basal 71%; Bolus 29%        Lab Results   Component Value Date    HGBA1C 7.3 (H) 01/03/2023       Glucagon: yes  Back-up basal insulin: yes, Lantus    Glucose Monitoring:  Meter/CGM: Dexcom G6, both CGM and Pump Report downloaded and reviewed.     Hypoglycemic Episodes:    None since last visit    DKA: none, recently    Diet/Exercise:  Usually a big meal in the evening and smaller meals in between.  Not eating much while working as a .      Pregnancy plans: none, OCPs    Screening / DM Complications:  Neuropathy: none  Last foot exam : 09/29/2022  Last eye exam : 09/28/2022;  no  "laser surgery or DR  CVD/MI: None:   Nephropathy: none  No results found for: MICROALFERNIE, [id number]      Lab Results   Component Value Date    TSH 2.307 09/09/2021     Lab Results   Component Value Date    TTGIGA 5 12/09/2021         Lipids: no therapies  Lab Results   Component Value Date    CHOL 179 09/09/2021    TRIG 77 09/09/2021    HDL 63 09/09/2021    LDLCALC 100.6 09/09/2021    CHOLHDL 35.2 09/09/2021         Current Outpatient Medications:     BD ULTRA-FINE ABRIL PEN NEEDLE 32 gauge x 5/32" Ndle, To use with insulin injections 4 times daily, Disp: 100 each, Rfl: 3    blood-glucose sensor (DEXCOM G6 SENSOR) Francie, Use 1 sensor as directed and change every 10 days. Continuous, Disp: 3 each, Rfl: PRN    blood-glucose transmitter (DEXCOM G6 TRANSMITTER) Francie, 1 each by Misc.(Non-Drug; Combo Route) route continuous., Disp: 1 each, Rfl: prn    FREESTYLE TEST Strp, Use as directed to test blood glucose levels up to 8 times a day, Disp: 250 each, Rfl: 11    glucagon (GVOKE HYPOPEN 2-PACK) 1 mg/0.2 mL AtIn, Inject 1 Package into the skin as needed. Glucagon is an emergency pen that is used to increase your blood sugar. Glucagon is a pen that is used in an emergency situation where you are unable to eat or drink anything. Glucagon is a medication that is given by someone else-spouse, child, etc., Disp: 2 each, Rfl: 0    glucagon (GVOKE HYPOPEN 2-PACK) 1 mg/0.2 mL AtIn, Inject 1 Package into the skin as needed. Glucagon is an emergency pen that is used to increase your blood sugar. Glucagon is a pen that is used in an emergency situation where you are unable to eat or drink anything. Glucagon is a medication that is given by someone else-spouse, child, etc., Disp: 2 each, Rfl: 0    insulin (LANTUS SOLOSTAR U-100 INSULIN) glargine 100 units/mL (3mL) SubQ pen, Take 26 units once a day in instance of pump failure, Disp: 1 Syringe, Rfl: 3    insulin lispro (HUMALOG U-100 INSULIN) 100 unit/mL injection, Max 200 units in pump " every 2 days-90day supply, Disp: 90 mL, Rfl: 3    insulin pump cart,automated,BT (OMNIPOD 5 G6 PODS, GEN 5,) Crtg, Inject 1 Device into the skin Every 3 (three) days., Disp: 30 each, Rfl: 3    LUTERA, 28, 0.1-20 mg-mcg per tablet, Take 1 PO daily, Disp: 84 tablet, Rfl: 4    urine glucose-ketones test (KETO-DIASTIX) Strp, Check urine ketones when BG>300, Disp: 100 strip, Rfl: 3    glucagon, human recombinant, (GLUCAGON EMERGENCY KIT, HUMAN,) 1 mg SolR, Inject 1 mg into the muscle as needed., Disp: 1 each, Rfl: 5    ROS as above    Objective:     Vitals:    01/05/23 1533   BP: 118/82   Pulse: 101     Wt Readings from Last 3 Encounters:   01/05/23 59.5 kg (131 lb 2.8 oz)   12/15/22 60.1 kg (132 lb 7.9 oz)   09/29/22 62.9 kg (138 lb 10.7 oz)     Body mass index is 23.99 kg/m².    Physical Exam  Vitals reviewed.   Constitutional:       Appearance: Normal appearance.   HENT:      Head: Normocephalic and atraumatic.   Eyes:      Extraocular Movements: Extraocular movements intact.   Cardiovascular:      Rate and Rhythm: Normal rate.   Pulmonary:      Effort: Pulmonary effort is normal.   Neurological:      General: No focal deficit present.      Mental Status: She is alert and oriented to person, place, and time.   Psychiatric:         Mood and Affect: Mood normal.         Behavior: Behavior normal.     LABS    Chemistry        Component Value Date/Time     12/09/2021 0954    K 4.6 12/09/2021 0954     12/09/2021 0954    CO2 22 (L) 12/09/2021 0954    BUN 11 12/09/2021 0954    CREATININE 0.8 12/09/2021 0954     (H) 12/09/2021 0954        Component Value Date/Time    CALCIUM 10.0 12/09/2021 0954    ALKPHOS 47 (L) 12/09/2021 0954    AST 18 12/09/2021 0954    ALT 16 12/09/2021 0954    BILITOT 0.6 12/09/2021 0954    ESTGFRAFRICA >60.0 12/09/2021 0954    EGFRNONAA >60.0 12/09/2021 0954              Assessment and Plan     Problem List Items Addressed This Visit          Endocrine    Insulin pump status      Review of insulin pump/CGM data shows adjustments are needed as below.  This includes dropping her target glucose and strengthening her carb ratio due to post prandial hyperglycemia and fasting hyperglycemia as well.    Pump Settings (settings changed in bold)  Basal Rate  12A:    0.9 u/hr  4A:      0.65 u/hr  8A:      0.55 u/hr  12P:    0.8 u/hr     Carb Ratio  12A:    1:10 (was 1:11)    ISF  12A:    1:65  12P:    1:60    Target: 120 (was 130)    IAT: 4 hrs           Type 1 diabetes mellitus without complication - Primary     Review of labs show A1c has improved to 7.3% down from 7.6% on last visit with us.  She was instructed on the need to time insulin boluses 15 mins before meals.  She has new  ordered due to malfunction earlier in the week and is using old Omnipod pump with same settings as her newer one.      Reviewed insulin pump and CGM data from the last 2 weeks.  See Insulin pump status for adjustments.     Plan:  Work on timing of prandial boluses  Will decrease target glucose and strengthen carb ratio  Will have her increase carbs calculated by 10% or more in the days leading up to her menstrual cycle when she has been having issues with hyperglycemia  Discussed option of using activity setting if trending low with blood sugars while working         Relevant Orders    Hemoglobin A1C        RTC in 3 months with labs prior      Nicholas Manuel DO [facility], [address]  [address] [place], [place], [address]    Office: [phone]  Fax: [phone]    Disclaimer: This note has been generated using voice-recognition software. There may be typographical errors that have been missed during proof-reading.    The above history labs imaging impression and plan were discussed with attending physician who is in agreement and also took part in this patient's care.  I personally reviewed all of the patients available medications, labs, imaging, vitals, allergies, medical  history.        Pump backup plan:    If the insulin pump is non functional and discontinued for anticipated more than 20 hours, please give daily injections of:  Long acting insulin: 20 units daily  Short acting insulin:  Humalog with meals according to carb ratios and sensitivity factor in the pump    When the insulin pump is restarted, do not restart basal rates until at least 22 hours after the last long acting insulin injection. You can set a 0% temporary basal setting that will last until this time and use your pump to bolus for meals and correction.    For any technical insulin pump issues, please contact the insulin pump company; the toll free number is printed on the label on the back of the insulin pump.

## 2023-01-05 NOTE — ASSESSMENT & PLAN NOTE
Review of labs show A1c has improved to 7.3% down from 7.6% on last visit with us.  She was instructed on the need to time insulin boluses 15 mins before meals.  She has new  ordered due to malfunction earlier in the week and is using old Omnipod pump with same settings as her newer one.      Reviewed insulin pump and CGM data from the last 2 weeks.  See Insulin pump status for adjustments.     Plan:  Work on timing of prandial boluses  Will decrease target glucose and strengthen carb ratio  Will have her increase carbs calculated by 10% or more in the days leading up to her menstrual cycle when she has been having issues with hyperglycemia  Discussed option of using activity setting if trending low with blood sugars while working

## 2023-01-05 NOTE — PATIENT INSTRUCTIONS
We will have you change your pump settings as below.  This will include dropping your target glucose and also strengthening your carb ratio slightly.  Continue to work on timing of the insulin around 15 mins before meals.  If you are trending low during work with these following changes you can also put on the activity setting which can help to prevent any lows.  For your elevated glucose readings in the days leading up to your menstrual cycle you can see how you do with the changes below but also if still high you can consider increasing your carb estimate up 10% from what you calculated (can also go up from there as needed).  You may need more corrections during this time as well.       We will plan to see you in the clinic in 3 months with repeat labs before that visit.     Pump Settings (settings changed in bold)  Basal Rate  12A:    0.9 u/hr  4A:      0.65 u/hr  8A:      0.55 u/hr  12P:    0.8 u/hr     Carb Ratio  12A:    1:10 (was 1:11)    ISF  12A:    1:65  12P:    1:60    Target: 120 (was 130)    IAT: 4 hrs      Pump backup plan:    If the insulin pump is non functional and discontinued for anticipated more than 20 hours, please give daily injections of:  Long acting insulin: 20 units daily  Short acting insulin:  Humalog with meals according to carb ratios and sensitivity factor in the pump    When the insulin pump is restarted, do not restart basal rates until at least 22 hours after the last long acting insulin injection. You can set a 0% temporary basal setting that will last until this time and use your pump to bolus for meals and correction.    For any technical insulin pump issues, please contact the insulin pump company; the toll free number is printed on the label on the back of the insulin pump.

## 2023-01-05 NOTE — ASSESSMENT & PLAN NOTE
Review of insulin pump/CGM data shows adjustments are needed as below.  This includes dropping her target glucose and strengthening her carb ratio due to post prandial hyperglycemia and fasting hyperglycemia as well.    Pump Settings (settings changed in bold)  Basal Rate  12A:    0.9 u/hr  4A:      0.65 u/hr  8A:      0.55 u/hr  12P:    0.8 u/hr     Carb Ratio  12A:    1:10 (was 1:11)    ISF  12A:    1:65  12P:    1:60    Target: 120 (was 130)    IAT: 4 hrs

## 2023-01-10 NOTE — PROGRESS NOTES
I have reviewed and concur with Dr. Hall's history, physical, assessment, and plan.  I have personally interviewed and examined the patient.      Giana Roman MD

## 2023-01-17 ENCOUNTER — PATIENT MESSAGE (OUTPATIENT)
Dept: ENDOCRINOLOGY | Facility: CLINIC | Age: 28
End: 2023-01-17
Payer: COMMERCIAL

## 2023-02-15 ENCOUNTER — TELEPHONE (OUTPATIENT)
Dept: OBSTETRICS AND GYNECOLOGY | Facility: CLINIC | Age: 28
End: 2023-02-15
Payer: COMMERCIAL

## 2023-03-02 ENCOUNTER — PATIENT MESSAGE (OUTPATIENT)
Dept: ENDOCRINOLOGY | Facility: CLINIC | Age: 28
End: 2023-03-02
Payer: COMMERCIAL

## 2023-03-02 DIAGNOSIS — E10.649 TYPE 1 DIABETES MELLITUS WITH HYPOGLYCEMIA AND WITHOUT COMA: Primary | ICD-10-CM

## 2023-03-03 RX ORDER — INSULIN ASPART 100 [IU]/ML
INJECTION, SOLUTION INTRAVENOUS; SUBCUTANEOUS
Qty: 30 ML | Refills: 11 | Status: SHIPPED | OUTPATIENT
Start: 2023-03-03

## 2023-03-09 ENCOUNTER — TELEPHONE (OUTPATIENT)
Dept: OBSTETRICS AND GYNECOLOGY | Facility: CLINIC | Age: 28
End: 2023-03-09
Payer: COMMERCIAL

## 2023-03-15 ENCOUNTER — TELEPHONE (OUTPATIENT)
Dept: ENDOCRINOLOGY | Facility: CLINIC | Age: 28
End: 2023-03-15
Payer: COMMERCIAL

## 2023-03-16 ENCOUNTER — TELEPHONE (OUTPATIENT)
Dept: ENDOCRINOLOGY | Facility: CLINIC | Age: 28
End: 2023-03-16
Payer: COMMERCIAL

## 2023-03-16 NOTE — TELEPHONE ENCOUNTER
Try reaching out to the patient to inform her that her medication was approved. Patient didn't answer nor could I leave a message because her mailbox was full.  insulin lispro (HUMALOG U-100 INSULIN) 100 unit/mL injection  was approved.

## 2023-03-21 DIAGNOSIS — E10.649 TYPE 1 DIABETES MELLITUS WITH HYPOGLYCEMIA AND WITHOUT COMA: ICD-10-CM

## 2023-03-21 RX ORDER — INSULIN LISPRO 100 [IU]/ML
INJECTION, SOLUTION INTRAVENOUS; SUBCUTANEOUS
Qty: 90 ML | Refills: 3 | Status: SHIPPED | OUTPATIENT
Start: 2023-03-21 | End: 2024-03-27 | Stop reason: SDUPTHER

## 2023-03-30 ENCOUNTER — PATIENT MESSAGE (OUTPATIENT)
Dept: ENDOCRINOLOGY | Facility: CLINIC | Age: 28
End: 2023-03-30
Payer: COMMERCIAL

## 2023-03-31 ENCOUNTER — LAB VISIT (OUTPATIENT)
Dept: LAB | Facility: OTHER | Age: 28
End: 2023-03-31
Attending: STUDENT IN AN ORGANIZED HEALTH CARE EDUCATION/TRAINING PROGRAM
Payer: COMMERCIAL

## 2023-03-31 DIAGNOSIS — E10.9 TYPE 1 DIABETES MELLITUS WITHOUT COMPLICATION: ICD-10-CM

## 2023-03-31 LAB
ESTIMATED AVG GLUCOSE: 160 MG/DL (ref 68–131)
HBA1C MFR BLD: 7.2 % (ref 4–5.6)

## 2023-03-31 PROCEDURE — 36415 COLL VENOUS BLD VENIPUNCTURE: CPT | Performed by: STUDENT IN AN ORGANIZED HEALTH CARE EDUCATION/TRAINING PROGRAM

## 2023-03-31 PROCEDURE — 83036 HEMOGLOBIN GLYCOSYLATED A1C: CPT | Performed by: STUDENT IN AN ORGANIZED HEALTH CARE EDUCATION/TRAINING PROGRAM

## 2023-04-03 ENCOUNTER — PATIENT MESSAGE (OUTPATIENT)
Dept: ENDOCRINOLOGY | Facility: CLINIC | Age: 28
End: 2023-04-03

## 2023-04-06 ENCOUNTER — OFFICE VISIT (OUTPATIENT)
Dept: ENDOCRINOLOGY | Facility: CLINIC | Age: 28
End: 2023-04-06
Payer: COMMERCIAL

## 2023-04-06 VITALS
HEART RATE: 113 BPM | BODY MASS INDEX: 24.14 KG/M2 | SYSTOLIC BLOOD PRESSURE: 128 MMHG | DIASTOLIC BLOOD PRESSURE: 80 MMHG | WEIGHT: 131.19 LBS | HEIGHT: 62 IN | OXYGEN SATURATION: 98 %

## 2023-04-06 DIAGNOSIS — Z96.41 INSULIN PUMP STATUS: ICD-10-CM

## 2023-04-06 DIAGNOSIS — E10.65 TYPE 1 DIABETES MELLITUS WITH HYPERGLYCEMIA: ICD-10-CM

## 2023-04-06 DIAGNOSIS — E10.649 HYPOGLYCEMIA DUE TO TYPE 1 DIABETES MELLITUS: ICD-10-CM

## 2023-04-06 DIAGNOSIS — E10.649 TYPE 1 DIABETES MELLITUS WITH HYPOGLYCEMIA AND WITHOUT COMA: Primary | ICD-10-CM

## 2023-04-06 PROCEDURE — 1159F PR MEDICATION LIST DOCUMENTED IN MEDICAL RECORD: ICD-10-PCS | Mod: CPTII,S$GLB,, | Performed by: INTERNAL MEDICINE

## 2023-04-06 PROCEDURE — 99999 PR PBB SHADOW E&M-EST. PATIENT-LVL III: CPT | Mod: PBBFAC,,,

## 2023-04-06 PROCEDURE — 99214 OFFICE O/P EST MOD 30 MIN: CPT | Mod: 25,S$GLB,, | Performed by: INTERNAL MEDICINE

## 2023-04-06 PROCEDURE — 3074F SYST BP LT 130 MM HG: CPT | Mod: CPTII,S$GLB,, | Performed by: INTERNAL MEDICINE

## 2023-04-06 PROCEDURE — 3008F PR BODY MASS INDEX (BMI) DOCUMENTED: ICD-10-PCS | Mod: CPTII,S$GLB,, | Performed by: INTERNAL MEDICINE

## 2023-04-06 PROCEDURE — 99999 PR PBB SHADOW E&M-EST. PATIENT-LVL III: ICD-10-PCS | Mod: PBBFAC,,,

## 2023-04-06 PROCEDURE — 1160F PR REVIEW ALL MEDS BY PRESCRIBER/CLIN PHARMACIST DOCUMENTED: ICD-10-PCS | Mod: CPTII,S$GLB,, | Performed by: INTERNAL MEDICINE

## 2023-04-06 PROCEDURE — 1160F RVW MEDS BY RX/DR IN RCRD: CPT | Mod: CPTII,S$GLB,, | Performed by: INTERNAL MEDICINE

## 2023-04-06 PROCEDURE — 3079F PR MOST RECENT DIASTOLIC BLOOD PRESSURE 80-89 MM HG: ICD-10-PCS | Mod: CPTII,S$GLB,, | Performed by: INTERNAL MEDICINE

## 2023-04-06 PROCEDURE — 3051F HG A1C>EQUAL 7.0%<8.0%: CPT | Mod: CPTII,S$GLB,, | Performed by: INTERNAL MEDICINE

## 2023-04-06 PROCEDURE — 95251 CONT GLUC MNTR ANALYSIS I&R: CPT | Mod: S$GLB,,, | Performed by: INTERNAL MEDICINE

## 2023-04-06 PROCEDURE — 3051F PR MOST RECENT HEMOGLOBIN A1C LEVEL 7.0 - < 8.0%: ICD-10-PCS | Mod: CPTII,S$GLB,, | Performed by: INTERNAL MEDICINE

## 2023-04-06 PROCEDURE — 3008F BODY MASS INDEX DOCD: CPT | Mod: CPTII,S$GLB,, | Performed by: INTERNAL MEDICINE

## 2023-04-06 PROCEDURE — 1159F MED LIST DOCD IN RCRD: CPT | Mod: CPTII,S$GLB,, | Performed by: INTERNAL MEDICINE

## 2023-04-06 PROCEDURE — 3074F PR MOST RECENT SYSTOLIC BLOOD PRESSURE < 130 MM HG: ICD-10-PCS | Mod: CPTII,S$GLB,, | Performed by: INTERNAL MEDICINE

## 2023-04-06 PROCEDURE — 3079F DIAST BP 80-89 MM HG: CPT | Mod: CPTII,S$GLB,, | Performed by: INTERNAL MEDICINE

## 2023-04-06 PROCEDURE — 95251 PR GLUCOSE MONITOR, 72 HOUR, PHYS INTERP: ICD-10-PCS | Mod: S$GLB,,, | Performed by: INTERNAL MEDICINE

## 2023-04-06 PROCEDURE — 99214 PR OFFICE/OUTPT VISIT, EST, LEVL IV, 30-39 MIN: ICD-10-PCS | Mod: 25,S$GLB,, | Performed by: INTERNAL MEDICINE

## 2023-04-06 RX ORDER — BLOOD-GLUCOSE TRANSMITTER
1 EACH MISCELLANEOUS CONTINUOUS
Qty: 1 EACH | Refills: 3 | Status: SHIPPED | OUTPATIENT
Start: 2023-04-06 | End: 2024-05-26

## 2023-04-06 RX ORDER — INSULIN GLARGINE 100 [IU]/ML
INJECTION, SOLUTION SUBCUTANEOUS
Qty: 15 ML | Refills: 3 | Status: SHIPPED | OUTPATIENT
Start: 2023-04-06

## 2023-04-06 NOTE — PROGRESS NOTES
"Pump Clinic Return Visit  04/06/2023    Tammie Gomez is a 28 y.o.female presenting for follow-up of type 1 DM.    Diagnosed in 1999     Interval History:  Finds sugars are persistently high after meals but will also get decline while working  Not using activity mode as thought she needed to get out of automated mode    Notes some leaking around pump sites after wearing for a couple of days    Current diabetes regimen:  Pump: Omnipod 5 with Humalog insulin  Pump Settings  Basal Rate  12A:    0.9 u/hr  4A:      0.65 u/hr  8A:      0.55 u/hr  12P:    0.8 u/hr     Carb Ratio  12A:    1:10    ISF  12A:    1:65  12P:    1:60    Target: 120    IAT: 4 hrs          Lab Results   Component Value Date    HGBA1C 7.2 (H) 03/31/2023       Glucagon: yes  Back-up basal insulin: yes, Lantus    Glucose Monitoring:  Meter/CGM: Dexcom G6, both CGM and Pump Report downloaded and reviewed.       Hypoglycemic Episodes: sometimes at work    DKA: none, recently    Diet/Exercise:  Usually a big meal in the evening and smaller meals in between.  Not eating much while working as a .      Pregnancy plans: none, OCPs    Screening / DM Complications:  Neuropathy: none  Last foot exam : 09/29/2022  Last eye exam : 09/28/2022;  no laser surgery or DR  CVD/MI: None:   Nephropathy: none  No results found for: MICROALBUR, [id number]      Lab Results   Component Value Date    TSH 2.307 09/09/2021     Lab Results   Component Value Date    TTGIGA 5 12/09/2021         Lipids: no therapies  Lab Results   Component Value Date    CHOL 179 09/09/2021    TRIG 77 09/09/2021    HDL 63 09/09/2021    LDLCALC 100.6 09/09/2021    CHOLHDL 35.2 09/09/2021         Current Outpatient Medications:     BD ULTRA-FINE ABRIL PEN NEEDLE 32 gauge x 5/32" Ndle, To use with insulin injections 4 times daily, Disp: 100 each, Rfl: 3    blood sugar diagnostic (FREESTYLE TEST) Strp, USE AS DIRECTED TO TEST BLOOD GLUCOSE LEVELS UP TO 8 TIMES DAILY, Disp: 250 strip, Rfl: " 3    blood-glucose sensor (DEXCOM G6 SENSOR) Francie, Use 1 sensor as directed and change every 10 days. Continuous, Disp: 3 each, Rfl: PRN    glucagon (GVOKE HYPOPEN 2-PACK) 1 mg/0.2 mL AtIn, Inject 1 Package into the skin as needed. Glucagon is an emergency pen that is used to increase your blood sugar. Glucagon is a pen that is used in an emergency situation where you are unable to eat or drink anything. Glucagon is a medication that is given by someone else-spouse, child, etc., Disp: 2 each, Rfl: 0    glucagon (GVOKE HYPOPEN 2-PACK) 1 mg/0.2 mL AtIn, Inject 1 Package into the skin as needed. Glucagon is an emergency pen that is used to increase your blood sugar. Glucagon is a pen that is used in an emergency situation where you are unable to eat or drink anything. Glucagon is a medication that is given by someone else-spouse, child, etc., Disp: 2 each, Rfl: 0    insulin lispro (HUMALOG U-100 INSULIN) 100 unit/mL injection, Max 200 units in pump every 2 days-90day supply, Disp: 90 mL, Rfl: 3    insulin pump cart,automated,BT (OMNIPOD 5 G6 PODS, GEN 5,) Crtg, Inject 1 Device into the skin Every 3 (three) days., Disp: 30 each, Rfl: 3    LUTERA, 28, 0.1-20 mg-mcg per tablet, Take 1 PO daily, Disp: 84 tablet, Rfl: 4    NOVOLOG U-100 INSULIN ASPART 100 unit/mL injection, Inject subcutaneously up to 100 units daily via insulin pump, Disp: 30 mL, Rfl: 11    urine glucose-ketones test (KETO-DIASTIX) Strp, Check urine ketones when BG>300, Disp: 100 strip, Rfl: 3    blood-glucose transmitter (DEXCOM G6 TRANSMITTER) Francie, 1 each by Misc.(Non-Drug; Combo Route) route continuous., Disp: 1 each, Rfl: 3    glucagon, human recombinant, (GLUCAGON EMERGENCY KIT, HUMAN,) 1 mg SolR, Inject 1 mg into the muscle as needed., Disp: 1 each, Rfl: 5    LANTUS SOLOSTAR U-100 INSULIN glargine 100 units/mL SubQ pen, Take 26 units once a day in instance of pump failure, Disp: 1 each, Rfl: 3    ROS as above    Objective:     Vitals:    04/06/23  1438   BP: 128/80   Pulse: (!) 113       Wt Readings from Last 3 Encounters:   23 59.5 kg (131 lb 2.8 oz)   23 59.5 kg (131 lb 2.8 oz)   12/15/22 60.1 kg (132 lb 7.9 oz)     Body mass index is 23.99 kg/m².    Physical Exam  Vitals reviewed.   Constitutional:       Appearance: Normal appearance.   HENT:      Head: Normocephalic and atraumatic.   Eyes:      Extraocular Movements: Extraocular movements intact.   Cardiovascular:      Rate and Rhythm: Normal rate.   Pulmonary:      Effort: Pulmonary effort is normal.   Neurological:      General: No focal deficit present.      Mental Status: She is alert and oriented to person, place, and time.   Psychiatric:         Mood and Affect: Mood normal.         Behavior: Behavior normal.   No lipohypertrophy     LABS    Chemistry        Component Value Date/Time     2021 0954    K 4.6 2021 0954     2021 0954    CO2 22 (L) 2021 0954    BUN 11 2021 0954    CREATININE 0.8 2021 0954     (H) 2021 0954        Component Value Date/Time    CALCIUM 10.0 2021 0954    ALKPHOS 47 (L) 2021 0954    AST 18 2021 0954    ALT 16 2021 0954    BILITOT 0.6 2021 0954    ESTGFRAFRICA >60.0 2021 0954    EGFRNONAA >60.0 2021 0954              Assessment and Plan     Problem List Items Addressed This Visit          1 - High    Type 1 diabetes mellitus with hyperglycemia     Pump and sensor download reviewed and significant for hyperglycemia following most meals which is prolonged  Also with some decline in glucose while working    Discussed the following:  Patient Instructions   Change carb ratio to 1:8    Use activity mode during work. Can start about one hour before you usually drop and continue through end of shift    Check your Gvoke and lantus to make sure not       Patient has diabetes mellitus and manages diabetes with intensive insulin regimen with CSII. Patient requires a  therapeutic CGM and is willing to use therapeutic CGM for the necessary frequent adjustments to insulin doses. Patient will benefit from a CGM to prevent hypoglycemia due to high risk for or history of level 2 (<54 mg/dL) hypoglycemic events.  Due to COVID pandemic and need for remote monitoring this tool is medically necessary           Relevant Medications    blood-glucose transmitter (DEXCOM G6 TRANSMITTER) Francie    LANTUS SOLOSTAR U-100 INSULIN glargine 100 units/mL SubQ pen       2     Hypoglycemia due to type 1 diabetes mellitus     As above           Relevant Medications    LANTUS SOLOSTAR U-100 INSULIN glargine 100 units/mL SubQ pen       3     Insulin pump status     As above            Other Visit Diagnoses       Type 1 diabetes mellitus with hypoglycemia and without coma    -  Primary    Relevant Medications    blood-glucose transmitter (DEXCOM G6 TRANSMITTER) Francie    LANTUS SOLOSTAR U-100 INSULIN glargine 100 units/mL SubQ pen    Other Relevant Orders    Comprehensive Metabolic Panel    Hemoglobin A1C    Microalbumin/Creatinine Ratio, Urine    Lipid Panel    TSH               RTC in 4 months with labs prior          Pump backup plan:    If the insulin pump is non functional and discontinued for anticipated more than 20 hours, please give daily injections of:  Long acting insulin: 26 units daily  Short acting insulin:  Humalog with meals according to carb ratios and sensitivity factor in the pump      For any technical insulin pump issues, please contact the insulin pump company; the toll free number is printed on the label on the back of the insulin pump.

## 2023-04-06 NOTE — ASSESSMENT & PLAN NOTE
Pump and sensor download reviewed and significant for hyperglycemia following most meals which is prolonged  Also with some decline in glucose while working    Discussed the following:  Patient Instructions   Change carb ratio to 1:8    Use activity mode during work. Can start about one hour before you usually drop and continue through end of shift    Check your Gvoke and lantus to make sure not       Patient has diabetes mellitus and manages diabetes with intensive insulin regimen with CSII. Patient requires a therapeutic CGM and is willing to use therapeutic CGM for the necessary frequent adjustments to insulin doses. Patient will benefit from a CGM to prevent hypoglycemia due to high risk for or history of level 2 (<54 mg/dL) hypoglycemic events.  Due to COVID pandemic and need for remote monitoring this tool is medically necessary

## 2023-04-06 NOTE — PATIENT INSTRUCTIONS
Change carb ratio to 1:8    Use activity mode during work. Can start about one hour before you usually drop and continue through end of shift    Check your Gvoke and lantus to make sure not

## 2023-04-12 ENCOUNTER — TELEPHONE (OUTPATIENT)
Dept: PHARMACY | Facility: CLINIC | Age: 28
End: 2023-04-12
Payer: COMMERCIAL

## 2023-04-12 NOTE — TELEPHONE ENCOUNTER
DOCUMENTATION ONLY  Dexcom G6--Transmitter  Approval date: 4/11/2023 to 12/31/2023  Case ID# [id number]

## 2023-05-17 NOTE — PROGRESS NOTES
"  [facility] Progress Note  5/18/2023          Reason for Visit:      had concerns including Immunizations.       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    Reviewed immunizations.  Pt has history of varicella disease.  Vaccines are up to date other than Tdap which we will give today.  Will need titers for Hep B, MMR and varicella.  Also needs Tb screening- since today is Thursday and we cannot do PPD on thursdays, will do Quantiferon Gold TB screening.    Pt is type 1 diabetic, follows with Endo.  Is on Omnipod 5 system.  Has been happy with it - many fewer nighttime lows since starting on this system.  Last a1c 7.2      Problem List:     Patient Active Problem List   Diagnosis    Insulin pump status    Type 1 diabetes mellitus with hyperglycemia    Hypoglycemia due to type 1 diabetes mellitus         Medications:       Current Outpatient Medications:     BD ULTRA-FINE ABRIL PEN NEEDLE 32 gauge x 5/32" Ndle, To use with insulin injections 4 times daily, Disp: 100 each, Rfl: 3    blood sugar diagnostic (FREESTYLE TEST) Strp, USE AS DIRECTED TO TEST BLOOD GLUCOSE LEVELS UP TO 8 TIMES DAILY, Disp: 250 strip, Rfl: 3    blood-glucose sensor (DEXCOM G6 SENSOR) Francie, Use 1 sensor as directed and change every 10 days. Continuous, Disp: 3 each, Rfl: PRN    blood-glucose transmitter (DEXCOM G6 TRANSMITTER) Francie, 1 each by Misc.(Non-Drug; Combo Route) route continuous., Disp: 1 each, Rfl: 3    glucagon (GVOKE HYPOPEN 2-PACK) 1 mg/0.2 mL AtIn, Inject 1 Package into the skin as needed. Glucagon is an emergency pen that is used to increase your blood sugar. Glucagon is a pen that is used in an emergency situation where you are unable to eat or drink anything. Glucagon is a medication that is given by someone else-spouse, child, etc., Disp: 2 each, Rfl: 0    glucagon (GVOKE HYPOPEN 2-PACK) 1 mg/0.2 " "mL AtIn, Inject 1 Package into the skin as needed. Glucagon is an emergency pen that is used to increase your blood sugar. Glucagon is a pen that is used in an emergency situation where you are unable to eat or drink anything. Glucagon is a medication that is given by someone else-spouse, child, etc., Disp: 2 each, Rfl: 0    insulin lispro (HUMALOG U-100 INSULIN) 100 unit/mL injection, Max 200 units in pump every 2 days-90day supply, Disp: 90 mL, Rfl: 3    insulin pump cart,automated,BT (OMNIPOD 5 G6 PODS, GEN 5,) Crtg, Inject 1 Device into the skin Every 3 (three) days., Disp: 30 each, Rfl: 3    LANTUS SOLOSTAR U-100 INSULIN glargine 100 units/mL SubQ pen, Take 26 units once a day in instance of pump failure, Disp: 15 mL, Rfl: 3    LUTERA, 28, 0.1-20 mg-mcg per tablet, Take 1 PO daily, Disp: 84 tablet, Rfl: 4    NOVOLOG U-100 INSULIN ASPART 100 unit/mL injection, Inject subcutaneously up to 100 units daily via insulin pump, Disp: 30 mL, Rfl: 11    urine glucose-ketones test (KETO-DIASTIX) Strp, Check urine ketones when BG>300, Disp: 100 strip, Rfl: 3    glucagon, human recombinant, (GLUCAGON EMERGENCY KIT, HUMAN,) 1 mg SolR, Inject 1 mg into the muscle as needed., Disp: 1 each, Rfl: 5        Review of Systems:     Review of Systems   Constitutional:  Negative for chills and fever.   HENT:  Negative for ear pain and sore throat.    Respiratory:  Negative for cough, shortness of breath and wheezing.    Cardiovascular:  Negative for chest pain and palpitations.   Gastrointestinal:  Negative for constipation, diarrhea, nausea and vomiting.   Genitourinary:  Negative for dysuria and hematuria.   Musculoskeletal:  Negative for joint swelling and joint deformity.   Neurological:  Negative for dizziness and weakness.         Physical Exam:     Temp:    98.2 °F (36.8 °C)        Blood Pressure:  112/74        Pulse:   98     Respirations:  18  Weight:   57.9 kg (127 lb 10.3 oz)  Height:   5' 2" (1.575 m)  BMI:     Body mass " index is 23.35 kg/m².    Physical Exam  Constitutional:       General: She is not in acute distress.  HENT:      Right Ear: Tympanic membrane normal.      Left Ear: Tympanic membrane normal.      Nose: No congestion or rhinorrhea.      Mouth/Throat:      Pharynx: No oropharyngeal exudate or posterior oropharyngeal erythema.   Cardiovascular:      Rate and Rhythm: Normal rate and regular rhythm.   Pulmonary:      Effort: Pulmonary effort is normal. No respiratory distress.      Breath sounds: No wheezing.   Abdominal:      Palpations: Abdomen is soft.      Tenderness: There is no abdominal tenderness.   Skin:     General: Skin is warm.   Neurological:      General: No focal deficit present.      Mental Status: She is alert and oriented to person, place, and time.         Labs/Imaging/Testing:     Most recent CBC:  No results found for: WBC, HGB, PLT, MCV    Most recent Lipids:  Lab Results   Component Value Date    CHOL 179 09/09/2021    HDL 63 09/09/2021    LDLCALC 100.6 09/09/2021    TRIG 77 09/09/2021       Most recent Chemistry:  Lab Results   Component Value Date     12/09/2021    K 4.6 12/09/2021     12/09/2021    CO2 22 (L) 12/09/2021    BUN 11 12/09/2021    CREATININE 0.8 12/09/2021     (H) 12/09/2021    CALCIUM 10.0 12/09/2021    ALT 16 12/09/2021    AST 18 12/09/2021    ALKPHOS 47 (L) 12/09/2021    PROT 7.3 12/09/2021    ALBUMIN 3.9 12/09/2021       Other tests:  Lab Results   Component Value Date    TSH 2.307 09/09/2021    FREET4 1.22 07/30/2014    HGBA1C 7.2 (H) 03/31/2023               Assessment and Plan:     1. Well adult exam    2. Immunity status testing  - Hepatitis B Surface Antibody, Qual/Quant; Future  - Mumps, IgG Screen; Future  - Rubella Antibody, IgG; Future  - Rubeola Antibody IgG; Future  - Varicella Zoster Antibody, IgG; Future    3. Screening-pulmonary TB  - Quantiferon Gold TB; Future    4. Type 1 diabetes mellitus without complication  Following with endocrine    Tdap  today  Will follow up on titer results.       Cal Sin MD  5/18/2023    This note was prepared using voice-recognition software.  Although efforts are made to proofread the note, some errors may persist in the final document.

## 2023-05-18 ENCOUNTER — OFFICE VISIT (OUTPATIENT)
Dept: PRIMARY CARE CLINIC | Facility: CLINIC | Age: 28
End: 2023-05-18
Payer: COMMERCIAL

## 2023-05-18 ENCOUNTER — LAB VISIT (OUTPATIENT)
Dept: LAB | Facility: HOSPITAL | Age: 28
End: 2023-05-18
Attending: INTERNAL MEDICINE
Payer: COMMERCIAL

## 2023-05-18 VITALS
DIASTOLIC BLOOD PRESSURE: 74 MMHG | TEMPERATURE: 98 F | WEIGHT: 127.63 LBS | HEART RATE: 98 BPM | HEIGHT: 62 IN | OXYGEN SATURATION: 98 % | BODY MASS INDEX: 23.49 KG/M2 | RESPIRATION RATE: 18 BRPM | SYSTOLIC BLOOD PRESSURE: 112 MMHG

## 2023-05-18 DIAGNOSIS — Z00.00 WELL ADULT EXAM: Primary | ICD-10-CM

## 2023-05-18 DIAGNOSIS — E10.9 TYPE 1 DIABETES MELLITUS WITHOUT COMPLICATION: ICD-10-CM

## 2023-05-18 DIAGNOSIS — Z11.1 SCREENING-PULMONARY TB: ICD-10-CM

## 2023-05-18 DIAGNOSIS — Z01.84 IMMUNITY STATUS TESTING: ICD-10-CM

## 2023-05-18 PROCEDURE — 86480 TB TEST CELL IMMUN MEASURE: CPT | Performed by: INTERNAL MEDICINE

## 2023-05-18 PROCEDURE — 3051F HG A1C>EQUAL 7.0%<8.0%: CPT | Mod: CPTII,S$GLB,, | Performed by: INTERNAL MEDICINE

## 2023-05-18 PROCEDURE — 3074F SYST BP LT 130 MM HG: CPT | Mod: CPTII,S$GLB,, | Performed by: INTERNAL MEDICINE

## 2023-05-18 PROCEDURE — 3008F BODY MASS INDEX DOCD: CPT | Mod: CPTII,S$GLB,, | Performed by: INTERNAL MEDICINE

## 2023-05-18 PROCEDURE — 3051F PR MOST RECENT HEMOGLOBIN A1C LEVEL 7.0 - < 8.0%: ICD-10-PCS | Mod: CPTII,S$GLB,, | Performed by: INTERNAL MEDICINE

## 2023-05-18 PROCEDURE — 86706 HEP B SURFACE ANTIBODY: CPT | Performed by: INTERNAL MEDICINE

## 2023-05-18 PROCEDURE — 99385 PR PREVENTIVE VISIT,NEW,18-39: ICD-10-PCS | Mod: S$GLB,,, | Performed by: INTERNAL MEDICINE

## 2023-05-18 PROCEDURE — 86787 VARICELLA-ZOSTER ANTIBODY: CPT | Performed by: INTERNAL MEDICINE

## 2023-05-18 PROCEDURE — 99999 PR PBB SHADOW E&M-EST. PATIENT-LVL V: ICD-10-PCS | Mod: PBBFAC,,, | Performed by: INTERNAL MEDICINE

## 2023-05-18 PROCEDURE — 86735 MUMPS ANTIBODY: CPT | Performed by: INTERNAL MEDICINE

## 2023-05-18 PROCEDURE — 3078F PR MOST RECENT DIASTOLIC BLOOD PRESSURE < 80 MM HG: ICD-10-PCS | Mod: CPTII,S$GLB,, | Performed by: INTERNAL MEDICINE

## 2023-05-18 PROCEDURE — 3008F PR BODY MASS INDEX (BMI) DOCUMENTED: ICD-10-PCS | Mod: CPTII,S$GLB,, | Performed by: INTERNAL MEDICINE

## 2023-05-18 PROCEDURE — 86762 RUBELLA ANTIBODY: CPT | Performed by: INTERNAL MEDICINE

## 2023-05-18 PROCEDURE — 1159F PR MEDICATION LIST DOCUMENTED IN MEDICAL RECORD: ICD-10-PCS | Mod: CPTII,S$GLB,, | Performed by: INTERNAL MEDICINE

## 2023-05-18 PROCEDURE — 99385 PREV VISIT NEW AGE 18-39: CPT | Mod: S$GLB,,, | Performed by: INTERNAL MEDICINE

## 2023-05-18 PROCEDURE — 86765 RUBEOLA ANTIBODY: CPT | Performed by: INTERNAL MEDICINE

## 2023-05-18 PROCEDURE — 36415 COLL VENOUS BLD VENIPUNCTURE: CPT | Mod: PN | Performed by: INTERNAL MEDICINE

## 2023-05-18 PROCEDURE — 3074F PR MOST RECENT SYSTOLIC BLOOD PRESSURE < 130 MM HG: ICD-10-PCS | Mod: CPTII,S$GLB,, | Performed by: INTERNAL MEDICINE

## 2023-05-18 PROCEDURE — 1159F MED LIST DOCD IN RCRD: CPT | Mod: CPTII,S$GLB,, | Performed by: INTERNAL MEDICINE

## 2023-05-18 PROCEDURE — 3078F DIAST BP <80 MM HG: CPT | Mod: CPTII,S$GLB,, | Performed by: INTERNAL MEDICINE

## 2023-05-18 PROCEDURE — 99999 PR PBB SHADOW E&M-EST. PATIENT-LVL V: CPT | Mod: PBBFAC,,, | Performed by: INTERNAL MEDICINE

## 2023-05-19 LAB
MUMPS IGG INTERPRETATION: NEGATIVE
MUMPS IGG SCREEN: <5 AU/ML
RUBEOLA IGG ANTIBODY: 42.2 AU/ML
RUBEOLA INTERPRETATION: POSITIVE
RUBV IGG SER-ACNC: 49.9 IU/ML
RUBV IGG SER-IMP: REACTIVE
VARICELLA INTERPRETATION: POSITIVE
VARICELLA ZOSTER IGG: 777.4 AU/ML

## 2023-05-20 LAB
GAMMA INTERFERON BACKGROUND BLD IA-ACNC: 0.01 IU/ML
M TB IFN-G CD4+ BCKGRND COR BLD-ACNC: 0.07 IU/ML
MITOGEN IGNF BCKGRD COR BLD-ACNC: 7.61 IU/ML
TB GOLD PLUS: NEGATIVE
TB2 - NIL: 0.03 IU/ML

## 2023-05-22 LAB
HBV SURFACE AB SER QL IA: NEGATIVE
HBV SURFACE AB SERPL IA-ACNC: <3 MIU/ML

## 2023-05-23 DIAGNOSIS — Z23 NEED FOR MMR VACCINE: Primary | ICD-10-CM

## 2023-05-24 ENCOUNTER — TELEPHONE (OUTPATIENT)
Dept: PRIMARY CARE CLINIC | Facility: CLINIC | Age: 28
End: 2023-05-24
Payer: COMMERCIAL

## 2023-05-24 DIAGNOSIS — Z23 NEED FOR MMR VACCINE: Primary | ICD-10-CM

## 2023-05-24 DIAGNOSIS — Z23 NEED FOR HEPATITIS B VACCINATION: ICD-10-CM

## 2023-05-24 NOTE — TELEPHONE ENCOUNTER
----- Message from Cal Sin MD sent at 5/23/2023  5:26 PM CDT -----  I think I put in immunization clinic referral- can you check and see if I did it right?  ----- Message -----  From: Missy Sorto LPN  Sent: 5/23/2023  11:30 AM CDT  To: Cal Sin MD    Hey Dr. Sin,     We don't have MMR vaccines in clinic for adults. She may need a referral to the ID-immunization clinic.   ----- Message -----  From: Cal Sin MD  Sent: 5/23/2023   7:56 AM CDT  To: [facility] Staff    Pt needs to do 2 MMR vaccines at least 4 weeks apart, and needs to repeat a Hepatitis b series.  Can you help her arrange this?  Thanks    ----- Message -----  From: [facility]  Sent: 5/19/2023  11:59 AM CDT  To: Cal Sin MD

## 2023-05-26 ENCOUNTER — PATIENT MESSAGE (OUTPATIENT)
Dept: INFECTIOUS DISEASES | Facility: CLINIC | Age: 28
End: 2023-05-26
Payer: COMMERCIAL

## 2023-06-18 NOTE — PROGRESS NOTES
"  [facility] Progress Note  6/20/2023          Reason for Visit:      had concerns including Immunizations.       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  Here today to get vaccinations.  Will need Hep B x 3 - start series today.  Will need to schedule MMR at pharmacy.  Plan repeat Mumps titers in 4-6 weeks.  Will repeat Hep B titer after series complete.        Problem List:     Patient Active Problem List   Diagnosis    Insulin pump status    Type 1 diabetes mellitus with hyperglycemia    Hypoglycemia due to type 1 diabetes mellitus         Medications:       Current Outpatient Medications:     BD ULTRA-FINE ABRIL PEN NEEDLE 32 gauge x 5/32" Ndle, To use with insulin injections 4 times daily, Disp: 100 each, Rfl: 3    blood-glucose sensor (DEXCOM G6 SENSOR) Francie, Use 1 sensor as directed and change every 10 days. Continuous, Disp: 3 each, Rfl: PRN    blood-glucose transmitter (DEXCOM G6 TRANSMITTER) Francie, Use as directed continous, Disp: 1 each, Rfl: 3    glucagon (GVOKE HYPOPEN 2-PACK) 1 mg/0.2 mL AtIn, Inject 1 Package into the skin as needed. Glucagon is an emergency pen that is used to increase your blood sugar. Glucagon is a pen that is used in an emergency situation where you are unable to eat or drink anything. Glucagon is a medication that is given by someone else-spouse, child, etc., Disp: 2 each, Rfl: 0    glucagon (GVOKE HYPOPEN 2-PACK) 1 mg/0.2 mL AtIn, Inject 1 Package into the skin as needed. Glucagon is an emergency pen that is used to increase your blood sugar. Glucagon is a pen that is used in an emergency situation where you are unable to eat or drink anything. Glucagon is a medication that is given by someone else-spouse, child, etc., Disp: 2 each, Rfl: 0    insulin lispro (HUMALOG U-100 INSULIN) 100 unit/mL injection, Max 200 units in pump every 2 days-90day supply, Disp: 90 mL, Rfl: 3    insulin pump " cart,automated,BT (OMNIPOD 5 G6 PODS, GEN 5,) Crtg, Inject 1 Device into the skin Every 3 (three) days., Disp: 30 each, Rfl: 3    LANTUS SOLOSTAR U-100 INSULIN glargine 100 units/mL SubQ pen, Take 26 units once a day in instance of pump failure, Disp: 15 mL, Rfl: 3    LUTERA, 28, 0.1-20 mg-mcg per tablet, Take 1 PO daily, Disp: 84 tablet, Rfl: 4    NOVOLOG U-100 INSULIN ASPART 100 unit/mL injection, Inject subcutaneously up to 100 units daily via insulin pump, Disp: 30 mL, Rfl: 11    urine glucose-ketones test (KETO-DIASTIX) Strp, Check urine ketones when BG>300, Disp: 100 strip, Rfl: 3    blood sugar diagnostic (FREESTYLE TEST) Strp, USE AS DIRECTED TO TEST BLOOD GLUCOSE LEVELS UP TO 8 TIMES DAILY (Patient not taking: Reported on 6/20/2023), Disp: 250 strip, Rfl: 3    glucagon, human recombinant, (GLUCAGON EMERGENCY KIT, HUMAN,) 1 mg SolR, Inject 1 mg into the muscle as needed., Disp: 1 each, Rfl: 5    measles, mumps and rubella vaccine (MMR) 1,000-12,500 TCID50/0.5 mL injection, Inject 0.5 mLs into the skin once. for 1 dose, Disp: 0.5 mL, Rfl: 0        Review of Systems:     Review of Systems   Constitutional:  Negative for activity change and unexpected weight change.   HENT:  Negative for hearing loss, rhinorrhea and trouble swallowing.    Eyes:  Negative for discharge and visual disturbance.   Respiratory:  Negative for chest tightness and wheezing.    Cardiovascular:  Negative for chest pain and palpitations.   Gastrointestinal:  Negative for blood in stool, constipation, diarrhea and vomiting.   Endocrine: Negative for polydipsia and polyuria.   Genitourinary:  Negative for difficulty urinating, dysuria, hematuria and menstrual problem.   Musculoskeletal:  Negative for arthralgias, joint swelling and neck pain.   Neurological:  Negative for weakness and headaches.   Psychiatric/Behavioral:  Negative for confusion and dysphoric mood.          Physical Exam:     Temp:    98.2 °F (36.8 °C)        Blood Pressure:  " 114/86        Pulse:   72     Respirations:  18  Weight:   61.1 kg (134 lb 11.2 oz)  Height:   5' 2" (1.575 m)  BMI:     Body mass index is 24.64 kg/m².    Physical Exam  Constitutional:       General: She is not in acute distress.  Cardiovascular:      Rate and Rhythm: Normal rate and regular rhythm.   Pulmonary:      Effort: Pulmonary effort is normal. No respiratory distress.      Breath sounds: No wheezing.   Neurological:      General: No focal deficit present.      Mental Status: She is alert and oriented to person, place, and time.       Assessment and Plan:     1. Type 1 diabetes mellitus with hyperglycemia  Stable     2. Lack of immunity to hepatitis B virus demonstrated by serologic test  Hep B series x 3  Repeat hep b quantitative s ab 4-6 weeks after 3rd shot    3. Immunity status testing  MMR booster at pharmacy  Repeat mumps titer in 4-6 weeks  - Mumps, IgG Screen; Future         Cal Sin MD  6/20/2023    This note was prepared using voice-recognition software.  Although efforts are made to proofread the note, some errors may persist in the final document.    "

## 2023-06-18 NOTE — PROGRESS NOTES
"Pump Clinic Return Visit    Tammie Gomez is a 27 y.o.female presenting for follow-up of type 1 DM.    Diagnosed in 1999    Last visit in pump clinic was 06/23/2022 with Angela Calix NP.  She has also been seen by diabetes education (08/2022) recently with transition from Omnipod DASH to Omnipod 5 insulin pump.  Follow up DM education visit showed Omnipod working well with adjustment to carb ratio for post meal excursions planned but never implemented (1:12 to 1:11).  She now returns to insulin pump clinic for further evaluation.     She has been having high glucose readings and admits that she has been under bolusing for fear of going low with her blood sugars.  She denies missing any boluses and has been timing this appropriately (5-10 mins before).  She works odd hours 4 pm to 11 pm usually.  She gets nervous when her blood sugars are around 140s with the "arrows" on her Dexcom and she will start to correct at that point (juice etc.)  She gets nervous of lows because she lives by herself.  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  She has also had issues with handheld device crashing at times and she has been attempting to get ahold of tech support without much resolution.  This happens on average once a week.      Current diabetes regimen:  Pump: Omnipod 5 with Humalog insulin  Pump Settings  Basal Rate  12A:    0.9 u/hr  4A:      0.65 u/hr  8A:      0.55 u/hr  12P:    0.8 u/hr     Carb Ratio  12A:    1:12    ISF  12A:    1:65  12P:    1:60    Target: 130    IAT: 4 hrs    TDD  27 units  Basal 71%; Bolus 29%      Lab Results   Component Value Date    HGBA1C 7.6 (H) 09/27/2022       Glucagon: yes  Back-up basal insulin: yes, Lantus        Glucose Monitoring:  Meter/CGM: Dexcom G6, both CGM and Pump Report downloaded and reviewed.         Hypoglycemic Episodes:    None since last visit    DKA: none, recently      Diet/Exercise:  Once " "big meal (11-12am) and smaller meals in between    Pregnancy plans: none, OCPs    Screening / DM Complications:  Neuropathy: none  Last foot exam : 09/29/2022  Last eye exam : 09/28/2022;  no laser surgery or DR  CVD/MI: None:   Nephropathy: none  No results found for: MICROALBUR, [id number]      Lab Results   Component Value Date    TSH 2.307 09/09/2021     Lab Results   Component Value Date    TTGIGA 5 12/09/2021         Lipids: no therapies  Lab Results   Component Value Date    CHOL 179 09/09/2021    TRIG 77 09/09/2021    HDL 63 09/09/2021    LDLCALC 100.6 09/09/2021    CHOLHDL 35.2 09/09/2021             Current Outpatient Medications:     BD ULTRA-FINE ABRIL PEN NEEDLE 32 gauge x 5/32" Ndle, To use with insulin injections 4 times daily, Disp: 100 each, Rfl: 3    blood-glucose sensor (DEXCOM G6 SENSOR) Francie, 3 each by Misc.(Non-Drug; Combo Route) route continuous., Disp: 3 each, Rfl: PRN    blood-glucose transmitter (DEXCOM G6 TRANSMITTER) Francie, 1 each by Misc.(Non-Drug; Combo Route) route continuous., Disp: 1 each, Rfl: prn    FREESTYLE TEST Strp, Use as directed to test blood glucose levels up to 8 times a day, Disp: 250 each, Rfl: 11    glucagon (GVOKE HYPOPEN 2-PACK) 1 mg/0.2 mL AtIn, Inject 1 Package into the skin as needed. Glucagon is an emergency pen that is used to increase your blood sugar. Glucagon is a pen that is used in an emergency situation where you are unable to eat or drink anything. Glucagon is a medication that is given by someone else-spouse, child, etc., Disp: 2 each, Rfl: 0    glucagon (GVOKE HYPOPEN 2-PACK) 1 mg/0.2 mL AtIn, Inject 1 Package into the skin as needed. Glucagon is an emergency pen that is used to increase your blood sugar. Glucagon is a pen that is used in an emergency situation where you are unable to eat or drink anything. Glucagon is a medication that is given by someone else-spouse, child, etc., Disp: 2 each, Rfl: 0    insulin (LANTUS SOLOSTAR U-100 INSULIN) glargine 100 " units/mL (3mL) SubQ pen, Take 26 units once a day in instance of pump failure, Disp: 1 Syringe, Rfl: 3    insulin lispro (HUMALOG U-100 INSULIN) 100 unit/mL injection, Max 200 units in pump every 2 days-90day supply, Disp: 90 mL, Rfl: 3    insulin pump cart,automated,BT (OMNIPOD 5 G6 PODS, GEN 5,) Crtg, Inject 1 Device into the skin Every 3 (three) days., Disp: 30 each, Rfl: 3    insulin pump cartridge (OMNIPOD INSULIN REFILL) Crtg, Place 200units every 2days ICD-10: E10.65, Disp: 45 each, Rfl: 1    levonorgestrel-ethinyl estradiol (AVIANE,ALESSE,LESSINA) 0.1-20 mg-mcg per tablet, Take 1 tablet by mouth once daily., Disp: 30 tablet, Rfl: 11    LUTERA, 28, 0.1-20 mg-mcg per tablet, Take 1 PO daily, Disp: 28 tablet, Rfl: 12    urine glucose-ketones test (KETO-DIASTIX) Strp, Check urine ketones when BG>300, Disp: 100 strip, Rfl: 3    flash glucose sensor (FREESTYLE JUSTINE 2 SENSOR) Kit, 1 each by Misc.(Non-Drug; Combo Route) route every 14 (fourteen) days. (Patient not taking: Reported on 9/29/2022), Disp: 2 kit, Rfl: 11    glucagon, human recombinant, (GLUCAGON EMERGENCY KIT, HUMAN,) 1 mg SolR, Inject 1 mg into the muscle as needed., Disp: 1 each, Rfl: 5    ROS as above    Objective:     Vitals:    09/29/22 1403   BP: 122/74   Pulse: (!) 116     Wt Readings from Last 3 Encounters:   09/29/22 62.9 kg (138 lb 10.7 oz)   07/25/22 63.6 kg (140 lb 5.2 oz)   06/23/22 64.1 kg (141 lb 6.8 oz)     Body mass index is 25.36 kg/m².    Physical Exam    Protective Sensation (w/ 10 gram monofilament):  Right: Intact  Left: Intact    Visual Inspection:  Normal -  Bilateral    Pedal Pulses:   Right: Present  Left: Present    Posterior tibialis:   Right:Present  Left: Present      LABS    Chemistry        Component Value Date/Time     12/09/2021 0954    K 4.6 12/09/2021 0954     12/09/2021 0954    CO2 22 (L) 12/09/2021 0954    BUN 11 12/09/2021 0954    CREATININE 0.8 12/09/2021 0954     (H) 12/09/2021 0954         Component Value Date/Time    CALCIUM 10.0 12/09/2021 0954    ALKPHOS 47 (L) 12/09/2021 0954    AST 18 12/09/2021 0954    ALT 16 12/09/2021 0954    BILITOT 0.6 12/09/2021 0954    ESTGFRAFRICA >60.0 12/09/2021 0954    EGFRNONAA >60.0 12/09/2021 0954              Assessment and Plan     Problem List Items Addressed This Visit          Endocrine    Insulin pump status     - Per our conversation your insulin pump settings were changed as outlined below:  This will include adjustment to your carb ratio.      Pump: Omnipod 5 with Humalog insulin   Pump Settings  Basal Rate (change in bold)  12A:    0.9 u/hr  4A:      0.65 u/hr  8A:      0.55 u/hr  12P:    0.8 u/hr      Carb Ratio  12A:    1:11 (was 1:12)     ISF  12A:    1:65  12P:    1:60     Target: 130     IAT: 4 hrs         Type 1 diabetes mellitus with hypoglycemia and without coma - Primary     We have reviewed the insulin pump and CGM data.  Tammie will work on accurately inputting carbs into the pump.  For correcting with oral intake of juice we would recommend avoiding correcting too soon and instead wait until you are around 100 before considering correcting with oral intake.  Explained that the device itself with also work on suspending the basal insulin if blood sugars fall. She was encouraged to try Flonase under the skin for the Dexcom device placement areas to help avoid irritation.    She is up to date on eye exam and had a foot exam today.  Will plan A1C before next visit and she will follow up in pump clinic in 3 months.           Relevant Orders    Hemoglobin A1C        RTC in 3 months       Pump backup plan:    If the insulin pump is non functional and discontinued for anticipated more than 20 hours, please give daily injections of:  Long acting insulin: 23 units daily  Short acting insulin:  Humalog with meals according to carb ratios and sensitivity factor in the pump    When the insulin pump is restarted, do not restart basal rates until at least  22 hours after the last long acting insulin injection. You can set a 0% temporary basal setting that will last until this time and use your pump to bolus for meals and correction.    For any technical insulin pump issues, please contact the insulin pump company; the toll free number is printed on the label on the back of the insulin pump.        [facility]ievesS[facility], 6th Floor  [address] [place], [place], [address]    Office: [phone]  Fax: [phone]    Disclaimer: This note has been generated using voice-recognition software. There may be typographical errors that have been missed during proof-reading.    The above history labs imaging impression and plan were discussed with attending physician who is in agreement and also took part in this patient's care.  I personally reviewed all of the patients available medications, labs, imaging, vitals, allergies, medical history.                   Telemetry

## 2023-06-20 ENCOUNTER — OFFICE VISIT (OUTPATIENT)
Dept: PRIMARY CARE CLINIC | Facility: CLINIC | Age: 28
End: 2023-06-20
Payer: COMMERCIAL

## 2023-06-20 VITALS
HEART RATE: 72 BPM | OXYGEN SATURATION: 99 % | TEMPERATURE: 98 F | HEIGHT: 62 IN | WEIGHT: 134.69 LBS | BODY MASS INDEX: 24.78 KG/M2 | RESPIRATION RATE: 18 BRPM | SYSTOLIC BLOOD PRESSURE: 114 MMHG | DIASTOLIC BLOOD PRESSURE: 86 MMHG

## 2023-06-20 DIAGNOSIS — Z01.84 IMMUNITY STATUS TESTING: ICD-10-CM

## 2023-06-20 DIAGNOSIS — E10.65 TYPE 1 DIABETES MELLITUS WITH HYPERGLYCEMIA: Primary | ICD-10-CM

## 2023-06-20 DIAGNOSIS — Z01.84 LACK OF IMMUNITY TO HEPATITIS B VIRUS DEMONSTRATED BY SEROLOGIC TEST: ICD-10-CM

## 2023-06-20 PROCEDURE — 3051F HG A1C>EQUAL 7.0%<8.0%: CPT | Mod: CPTII,S$GLB,, | Performed by: INTERNAL MEDICINE

## 2023-06-20 PROCEDURE — 99999 PR PBB SHADOW E&M-EST. PATIENT-LVL V: ICD-10-PCS | Mod: PBBFAC,,, | Performed by: INTERNAL MEDICINE

## 2023-06-20 PROCEDURE — 3079F DIAST BP 80-89 MM HG: CPT | Mod: CPTII,S$GLB,, | Performed by: INTERNAL MEDICINE

## 2023-06-20 PROCEDURE — 3074F PR MOST RECENT SYSTOLIC BLOOD PRESSURE < 130 MM HG: ICD-10-PCS | Mod: CPTII,S$GLB,, | Performed by: INTERNAL MEDICINE

## 2023-06-20 PROCEDURE — 90471 HEPATITIS B VACCINE ADULT IM: ICD-10-PCS | Mod: S$GLB,,, | Performed by: INTERNAL MEDICINE

## 2023-06-20 PROCEDURE — 90471 IMMUNIZATION ADMIN: CPT | Mod: S$GLB,,, | Performed by: INTERNAL MEDICINE

## 2023-06-20 PROCEDURE — 99213 OFFICE O/P EST LOW 20 MIN: CPT | Mod: 25,S$GLB,, | Performed by: INTERNAL MEDICINE

## 2023-06-20 PROCEDURE — 1159F MED LIST DOCD IN RCRD: CPT | Mod: CPTII,S$GLB,, | Performed by: INTERNAL MEDICINE

## 2023-06-20 PROCEDURE — 90746 HEPATITIS B VACCINE ADULT IM: ICD-10-PCS | Mod: S$GLB,,, | Performed by: INTERNAL MEDICINE

## 2023-06-20 PROCEDURE — 99999 PR PBB SHADOW E&M-EST. PATIENT-LVL V: CPT | Mod: PBBFAC,,, | Performed by: INTERNAL MEDICINE

## 2023-06-20 PROCEDURE — 3051F PR MOST RECENT HEMOGLOBIN A1C LEVEL 7.0 - < 8.0%: ICD-10-PCS | Mod: CPTII,S$GLB,, | Performed by: INTERNAL MEDICINE

## 2023-06-20 PROCEDURE — 90746 HEPB VACCINE 3 DOSE ADULT IM: CPT | Mod: S$GLB,,, | Performed by: INTERNAL MEDICINE

## 2023-06-20 PROCEDURE — 3008F PR BODY MASS INDEX (BMI) DOCUMENTED: ICD-10-PCS | Mod: CPTII,S$GLB,, | Performed by: INTERNAL MEDICINE

## 2023-06-20 PROCEDURE — 3074F SYST BP LT 130 MM HG: CPT | Mod: CPTII,S$GLB,, | Performed by: INTERNAL MEDICINE

## 2023-06-20 PROCEDURE — 1159F PR MEDICATION LIST DOCUMENTED IN MEDICAL RECORD: ICD-10-PCS | Mod: CPTII,S$GLB,, | Performed by: INTERNAL MEDICINE

## 2023-06-20 PROCEDURE — 3079F PR MOST RECENT DIASTOLIC BLOOD PRESSURE 80-89 MM HG: ICD-10-PCS | Mod: CPTII,S$GLB,, | Performed by: INTERNAL MEDICINE

## 2023-06-20 PROCEDURE — 3008F BODY MASS INDEX DOCD: CPT | Mod: CPTII,S$GLB,, | Performed by: INTERNAL MEDICINE

## 2023-06-20 PROCEDURE — 99213 PR OFFICE/OUTPT VISIT, EST, LEVL III, 20-29 MIN: ICD-10-PCS | Mod: 25,S$GLB,, | Performed by: INTERNAL MEDICINE

## 2023-06-28 DIAGNOSIS — E10.649 TYPE 1 DIABETES MELLITUS WITH HYPOGLYCEMIA AND WITHOUT COMA: ICD-10-CM

## 2023-06-28 RX ORDER — INSULIN PMP CART,AUT,G6/7,CNTR
1 EACH SUBCUTANEOUS
Qty: 30 EACH | Refills: 3 | Status: SHIPPED | OUTPATIENT
Start: 2023-06-28 | End: 2024-06-21

## 2023-07-21 ENCOUNTER — PATIENT MESSAGE (OUTPATIENT)
Dept: PRIMARY CARE CLINIC | Facility: CLINIC | Age: 28
End: 2023-07-21
Payer: COMMERCIAL

## 2023-08-11 ENCOUNTER — PATIENT MESSAGE (OUTPATIENT)
Dept: PRIMARY CARE CLINIC | Facility: CLINIC | Age: 28
End: 2023-08-11
Payer: COMMERCIAL

## 2023-08-15 ENCOUNTER — PATIENT MESSAGE (OUTPATIENT)
Dept: ENDOCRINOLOGY | Facility: CLINIC | Age: 28
End: 2023-08-15
Payer: COMMERCIAL

## 2023-09-08 ENCOUNTER — OFFICE VISIT (OUTPATIENT)
Dept: ENDOCRINOLOGY | Facility: CLINIC | Age: 28
End: 2023-09-08
Payer: COMMERCIAL

## 2023-09-08 ENCOUNTER — PATIENT MESSAGE (OUTPATIENT)
Dept: ENDOCRINOLOGY | Facility: CLINIC | Age: 28
End: 2023-09-08

## 2023-09-08 DIAGNOSIS — E10.649 HYPOGLYCEMIA DUE TO TYPE 1 DIABETES MELLITUS: ICD-10-CM

## 2023-09-08 DIAGNOSIS — E10.65 TYPE 1 DIABETES MELLITUS WITH HYPERGLYCEMIA: Primary | ICD-10-CM

## 2023-09-08 DIAGNOSIS — E10.649 TYPE 1 DIABETES MELLITUS WITH HYPOGLYCEMIA AND WITHOUT COMA: ICD-10-CM

## 2023-09-08 DIAGNOSIS — Z96.41 INSULIN PUMP STATUS: ICD-10-CM

## 2023-09-08 PROCEDURE — 3051F PR MOST RECENT HEMOGLOBIN A1C LEVEL 7.0 - < 8.0%: ICD-10-PCS | Mod: CPTII,95,, | Performed by: INTERNAL MEDICINE

## 2023-09-08 PROCEDURE — 99214 PR OFFICE/OUTPT VISIT, EST, LEVL IV, 30-39 MIN: ICD-10-PCS | Mod: 25,95,, | Performed by: INTERNAL MEDICINE

## 2023-09-08 PROCEDURE — 1160F RVW MEDS BY RX/DR IN RCRD: CPT | Mod: CPTII,95,, | Performed by: INTERNAL MEDICINE

## 2023-09-08 PROCEDURE — 1159F PR MEDICATION LIST DOCUMENTED IN MEDICAL RECORD: ICD-10-PCS | Mod: CPTII,95,, | Performed by: INTERNAL MEDICINE

## 2023-09-08 PROCEDURE — 95251 PR GLUCOSE MONITOR, 72 HOUR, PHYS INTERP: ICD-10-PCS | Mod: NDTC,S$GLB,, | Performed by: INTERNAL MEDICINE

## 2023-09-08 PROCEDURE — 3051F HG A1C>EQUAL 7.0%<8.0%: CPT | Mod: CPTII,95,, | Performed by: INTERNAL MEDICINE

## 2023-09-08 PROCEDURE — 99214 OFFICE O/P EST MOD 30 MIN: CPT | Mod: 25,95,, | Performed by: INTERNAL MEDICINE

## 2023-09-08 PROCEDURE — 1160F PR REVIEW ALL MEDS BY PRESCRIBER/CLIN PHARMACIST DOCUMENTED: ICD-10-PCS | Mod: CPTII,95,, | Performed by: INTERNAL MEDICINE

## 2023-09-08 PROCEDURE — 95251 CONT GLUC MNTR ANALYSIS I&R: CPT | Mod: NDTC,S$GLB,, | Performed by: INTERNAL MEDICINE

## 2023-09-08 PROCEDURE — 1159F MED LIST DOCD IN RCRD: CPT | Mod: CPTII,95,, | Performed by: INTERNAL MEDICINE

## 2023-09-08 NOTE — PROGRESS NOTES
Pump Clinic Return Visit  09/08/2023    Tammie Gomez is a 28 y.o.female presenting for follow-up of type 1 DM.    The patient location is: home in [place]  The chief complaint leading to consultation is:   Chief Complaint   Patient presents with    Diabetes       Visit type: audiovisual    Face to Face time with patient: 15 minutes  25 minutes of total time spent on the encounter, which includes face to face time and non-face to face time preparing to see the patient (eg, review of tests), Obtaining and/or reviewing separately obtained history, Documenting clinical information in the electronic or other health record, Independently interpreting results (not separately reported) and communicating results to the patient/family/caregiver, or Care coordination (not separately reported).    Each patient to whom he or she provides medical services by telemedicine is:  (1) informed of the relationship between the physician and patient and the respective role of any other health care provider with respect to management of the patient; and (2) notified that he or she may decline to receive medical services by telemedicine and may withdraw from such care at any time.      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  "      Hypoglycemic Episodes: sometimes at work    DKA: none, recently    Has started nursing school    Pregnancy plans: none, OCPs    Screening / DM Complications:  Neuropathy: none  Last foot exam : 09/29/2022  Last eye exam : 09/28/2022;  no laser surgery or DR  CVD/MI: None:   Nephropathy: none  No results found for: "MICROALBUR", "[id number]"      Lab Results   Component Value Date    TSH 2.307 09/09/2021     Lab Results   Component Value Date    TTGIGA 5 12/09/2021         Lipids: no therapies  Lab Results   Component Value Date    CHOL 179 09/09/2021    TRIG 77 09/09/2021    HDL 63 09/09/2021    LDLCALC 100.6 09/09/2021    CHOLHDL 35.2 09/09/2021         Current Outpatient Medications:     BD ULTRA-FINE ABRIL PEN NEEDLE 32 gauge x 5/32" Ndle, To use with insulin injections 4 times daily, Disp: 100 each, Rfl: 3    blood sugar diagnostic (FREESTYLE TEST) Strp, USE AS DIRECTED TO TEST BLOOD GLUCOSE LEVELS UP TO 8 TIMES DAILY (Patient not taking: Reported on 6/20/2023), Disp: 250 strip, Rfl: 3    blood-glucose sensor (DEXCOM G6 SENSOR) Francie, Use 1 sensor as directed and change every 10 days. Continuous, Disp: 3 each, Rfl: PRN    blood-glucose transmitter (DEXCOM G6 TRANSMITTER) Francie, Use as directed continous, Disp: 1 each, Rfl: 3    glucagon (GVOKE HYPOPEN 2-PACK) 1 mg/0.2 mL AtIn, Inject 1 Package into the skin as needed. Glucagon is an emergency pen that is used to increase your blood sugar. Glucagon is a pen that is used in an emergency situation where you are unable to eat or drink anything. Glucagon is a medication that is given by someone else-spouse, child, etc., Disp: 2 each, Rfl: 0    glucagon (GVOKE HYPOPEN 2-PACK) 1 mg/0.2 mL AtIn, Inject 1 Package into the skin as needed. Glucagon is an emergency pen that is used to increase your blood sugar. Glucagon is a pen that is used in an emergency situation where you are unable to eat or drink anything. Glucagon is a medication that is given by someone " else-spouse, child, etc., Disp: 2 each, Rfl: 0    insulin lispro (HUMALOG U-100 INSULIN) 100 unit/mL injection, Max 200 units in pump every 2 days-90day supply, Disp: 90 mL, Rfl: 3    insulin pump cart,automated,BT (OMNIPOD 5 G6 PODS, GEN 5,) Crtg, Inject 1 Device into the skin Every 3 (three) days., Disp: 30 each, Rfl: 3    LANTUS SOLOSTAR U-100 INSULIN glargine 100 units/mL SubQ pen, Take 26 units once a day in instance of pump failure, Disp: 15 mL, Rfl: 3    LUTERA, 28, 0.1-20 mg-mcg per tablet, Take 1 PO daily, Disp: 84 tablet, Rfl: 4    measles, mumps and rubella vaccine (MMR) 1,000-12,500 TCID50/0.5 mL injection, Inject into the skin., Disp: 0.5 mL, Rfl: 0    NOVOLOG U-100 INSULIN ASPART 100 unit/mL injection, Inject subcutaneously up to 100 units daily via insulin pump, Disp: 30 mL, Rfl: 11    urine glucose-ketones test (KETO-DIASTIX) Strp, Check urine ketones when BG>300, Disp: 100 strip, Rfl: 3    ROS as above    Objective:     There were no vitals filed for this visit.      Wt Readings from Last 3 Encounters:   06/20/23 61.1 kg (134 lb 11.2 oz)   05/18/23 57.9 kg (127 lb 10.3 oz)   04/06/23 59.5 kg (131 lb 2.8 oz)     There is no height or weight on file to calculate BMI.      LABS    Chemistry        Component Value Date/Time     12/09/2021 0954    K 4.6 12/09/2021 0954     12/09/2021 0954    CO2 22 (L) 12/09/2021 0954    BUN 11 12/09/2021 0954    CREATININE 0.8 12/09/2021 0954     (H) 12/09/2021 0954        Component Value Date/Time    CALCIUM 10.0 12/09/2021 0954    ALKPHOS 47 (L) 12/09/2021 0954    AST 18 12/09/2021 0954    ALT 16 12/09/2021 0954    BILITOT 0.6 12/09/2021 0954    ESTGFRAFRICA >60.0 12/09/2021 0954    EGFRNONAA >60.0 12/09/2021 0954              Assessment and Plan     Problem List Items Addressed This Visit          1 - High    Type 1 diabetes mellitus with hyperglycemia     Pump and sensor download reviewed and TIR 59%, no lows  Discussed the following:  Patient  Instructions     Try undercounting carbs by 30-50% if you will active soon after a bolus      If late bolusing manually change glucose to 120 to prevent overcorrection    Change target in pump to 110  Try to take corrections more often to help pump learn how much insulin to give    Check Gvoke expiration date    I recommend using Flonase (the nasal spray). Spray on skin before putting on the Omnipod and letting that dry well before applying. You can also layer SkinTac over the flonase. That helps the Omnipod stick well and also is a barrier for rashes    You can also go to [email or web address]-solutions for more recommendations    Labs next Wed at [facility], in afternoon  Pump clinic in December in person visit        Pump backup plan:    If the insulin pump is non functional and discontinued for anticipated more than 20 hours, please give daily injections of:  Long acting insulin: 26 units of Lantus daily  Short acting insulin:  Humalog with meals according to carb ratio 1 unit per 8 grams and sensitivity factor 1 unit for every 60 above 120      For any technical insulin pump issues, please contact the insulin pump company; the toll free number is printed on the label on the back of the insulin pump.             Relevant Orders    Comprehensive Metabolic Panel    Hemoglobin A1C    Microalbumin/Creatinine Ratio, Urine    TSH       2     Hypoglycemia due to type 1 diabetes mellitus     As above, no recent episodes            3     Insulin pump status     As above          Other Visit Diagnoses       Type 1 diabetes mellitus with hypoglycemia and without coma    -  Primary                 RTC in 3 months          Pump backup plan:    If the insulin pump is non functional and discontinued for anticipated more than 20 hours, please give daily injections of:  Long acting insulin: 26 units of Lantus daily  Short acting insulin:  Humalog with meals according to carb ratio 1 unit per 8 grams and sensitivity  factor 1 unit for every 60 above 120      For any technical insulin pump issues, please contact the insulin pump company; the toll free number is printed on the label on the back of the insulin pump.

## 2023-09-08 NOTE — ASSESSMENT & PLAN NOTE
Pump and sensor download reviewed and TIR 59%, no lows  Discussed the following:  Patient Instructions     Try undercounting carbs by 30-50% if you will active soon after a bolus      If late bolusing manually change glucose to 120 to prevent overcorrection    Change target in pump to 110  Try to take corrections more often to help pump learn how much insulin to give    Check Gvoke expiration date    I recommend using Flonase (the nasal spray). Spray on skin before putting on the Omnipod and letting that dry well before applying. You can also layer SkinTac over the flonase. That helps the Omnipod stick well and also is a barrier for rashes    You can also go to [email or web address].[email or web address]/skin-solutions for more recommendations    Labs next Wed at [facility], in afternoon  Pump clinic in December in person visit        Pump backup plan:    If the insulin pump is non functional and discontinued for anticipated more than 20 hours, please give daily injections of:  Long acting insulin: 26 units of Lantus daily  Short acting insulin:  Humalog with meals according to carb ratio 1 unit per 8 grams and sensitivity factor 1 unit for every 60 above 120      For any technical insulin pump issues, please contact the insulin pump company; the toll free number is printed on the label on the back of the insulin pump.

## 2023-09-08 NOTE — PATIENT INSTRUCTIONS
Try undercounting carbs by 30-50% if you will active soon after a bolus      If late bolusing manually change glucose to 120 to prevent overcorrection    Change target in pump to 110  Try to take corrections more often to help pump learn how much insulin to give    Check Gvoke expiration date    I recommend using Flonase (the nasal spray). Spray on skin before putting on the Omnipod and letting that dry well before applying. You can also layer SkinTac over the flonase. That helps the Omnipod stick well and also is a barrier for rashes    You can also go to [email or web address]://www.[email or web address]/skin-solutions for more recommendations    Labs next Wed at [facility], in afternoon  Pump clinic in December in person visit        Pump backup plan:    If the insulin pump is non functional and discontinued for anticipated more than 20 hours, please give daily injections of:  Long acting insulin: 26 units of Lantus daily  Short acting insulin:  Humalog with meals according to carb ratio 1 unit per 8 grams and sensitivity factor 1 unit for every 60 above 120      For any technical insulin pump issues, please contact the insulin pump company; the toll free number is printed on the label on the back of the insulin pump.

## 2023-12-29 ENCOUNTER — PATIENT MESSAGE (OUTPATIENT)
Dept: ADMINISTRATIVE | Facility: HOSPITAL | Age: 28
End: 2023-12-29
Payer: COMMERCIAL

## 2024-01-12 DIAGNOSIS — E10.649 TYPE 1 DIABETES MELLITUS WITH HYPOGLYCEMIA AND WITHOUT COMA: ICD-10-CM

## 2024-01-12 RX ORDER — BLOOD-GLUCOSE SENSOR
3 EACH MISCELLANEOUS
Qty: 3 EACH | Refills: 11 | Status: SHIPPED | OUTPATIENT
Start: 2024-01-12 | End: 2025-01-11

## 2024-03-27 DIAGNOSIS — E10.649 TYPE 1 DIABETES MELLITUS WITH HYPOGLYCEMIA AND WITHOUT COMA: Primary | ICD-10-CM

## 2024-03-27 RX ORDER — INSULIN LISPRO 100 [IU]/ML
INJECTION, SOLUTION INTRAVENOUS; SUBCUTANEOUS
Qty: 90 ML | Refills: 3 | Status: SHIPPED | OUTPATIENT
Start: 2024-03-27 | End: 2025-03-25

## 2024-03-29 ENCOUNTER — PATIENT MESSAGE (OUTPATIENT)
Dept: ADMINISTRATIVE | Facility: HOSPITAL | Age: 29
End: 2024-03-29
Payer: COMMERCIAL

## 2024-04-23 ENCOUNTER — TELEPHONE (OUTPATIENT)
Dept: PRIMARY CARE CLINIC | Facility: CLINIC | Age: 29
End: 2024-04-23
Payer: COMMERCIAL

## 2024-04-23 NOTE — TELEPHONE ENCOUNTER
----- Message from Cal Sin MD sent at 4/22/2024  7:15 PM CDT -----  Pt is overdue for labs (including A1c) for follow up on her diabetes  It looks like Dr. Roman ordered some last September that haven't been done yet  Can we help her schedule?

## 2024-05-02 ENCOUNTER — PATIENT MESSAGE (OUTPATIENT)
Dept: ADMINISTRATIVE | Facility: HOSPITAL | Age: 29
End: 2024-05-02
Payer: COMMERCIAL

## 2024-05-02 ENCOUNTER — PATIENT OUTREACH (OUTPATIENT)
Dept: ADMINISTRATIVE | Facility: HOSPITAL | Age: 29
End: 2024-05-02
Payer: COMMERCIAL

## 2024-05-25 DIAGNOSIS — E10.649 TYPE 1 DIABETES MELLITUS WITH HYPOGLYCEMIA AND WITHOUT COMA: ICD-10-CM

## 2024-05-27 RX ORDER — BLOOD-GLUCOSE TRANSMITTER
1 EACH MISCELLANEOUS
Qty: 1 EACH | Refills: 3 | Status: SHIPPED | OUTPATIENT
Start: 2024-05-27

## 2024-06-12 DIAGNOSIS — E11.9 TYPE 2 DIABETES MELLITUS WITHOUT COMPLICATION: ICD-10-CM

## 2024-06-23 DIAGNOSIS — E10.649 TYPE 1 DIABETES MELLITUS WITH HYPOGLYCEMIA AND WITHOUT COMA: ICD-10-CM

## 2024-06-24 RX ORDER — LEVONORGESTREL AND ETHINYL ESTRADIOL 0.1-0.02MG
KIT ORAL
Qty: 84 TABLET | Refills: 0 | Status: SHIPPED | OUTPATIENT
Start: 2024-06-24

## 2024-06-24 RX ORDER — INSULIN PMP CART,AUT,G6/7,CNTR
1 EACH SUBCUTANEOUS
Qty: 30 EACH | Refills: 0 | Status: SHIPPED | OUTPATIENT
Start: 2024-06-24 | End: 2024-07-24

## 2024-06-24 NOTE — TELEPHONE ENCOUNTER
Refill Routing Note   Medication(s) are not appropriate for processing by [facility] for the following reason(s):        Patient not seen by provider within 15 months    [facility] action(s):  Defer               Appointments  past 12m or future 3m with PCP    Date Provider   Last Visit   12/15/2022 Delfina Ramsey MD   Next Visit   Visit date not found Delfina Ramsey MD   ED visits in past 90 days: 0        Note composed:11:38 AM 06/24/2024

## 2024-08-21 ENCOUNTER — PATIENT MESSAGE (OUTPATIENT)
Dept: ENDOCRINOLOGY | Facility: CLINIC | Age: 29
End: 2024-08-21
Payer: COMMERCIAL

## 2024-08-21 NOTE — TELEPHONE ENCOUNTER
FYI, patient is scheduled for an appointment this coming Monday. We could give her the letter at that time.

## 2024-08-23 ENCOUNTER — PATIENT MESSAGE (OUTPATIENT)
Dept: OBSTETRICS AND GYNECOLOGY | Facility: CLINIC | Age: 29
End: 2024-08-23
Payer: COMMERCIAL

## 2024-08-26 RX ORDER — FLUCONAZOLE 150 MG/1
150 TABLET ORAL
Qty: 2 TABLET | Refills: 0 | Status: SHIPPED | OUTPATIENT
Start: 2024-08-26

## 2024-08-29 ENCOUNTER — PATIENT MESSAGE (OUTPATIENT)
Dept: OBSTETRICS AND GYNECOLOGY | Facility: CLINIC | Age: 29
End: 2024-08-29
Payer: COMMERCIAL

## 2024-09-18 DIAGNOSIS — E11.9 TYPE 2 DIABETES MELLITUS WITHOUT COMPLICATION: ICD-10-CM

## 2024-10-04 ENCOUNTER — TELEPHONE (OUTPATIENT)
Dept: OPTOMETRY | Facility: CLINIC | Age: 29
End: 2024-10-04
Payer: COMMERCIAL

## 2024-10-07 ENCOUNTER — OFFICE VISIT (OUTPATIENT)
Dept: OPTOMETRY | Facility: CLINIC | Age: 29
End: 2024-10-07
Payer: COMMERCIAL

## 2024-10-07 DIAGNOSIS — E10.9 TYPE 1 DIABETES MELLITUS WITHOUT RETINOPATHY: ICD-10-CM

## 2024-10-07 DIAGNOSIS — H52.03 HYPEROPIA OF BOTH EYES: ICD-10-CM

## 2024-10-07 DIAGNOSIS — H20.9 ANTERIOR UVEITIS: Primary | ICD-10-CM

## 2024-10-07 PROCEDURE — G2211 COMPLEX E/M VISIT ADD ON: HCPCS | Mod: S$GLB,,, | Performed by: OPTOMETRIST

## 2024-10-07 PROCEDURE — 99999 PR PBB SHADOW E&M-EST. PATIENT-LVL II: CPT | Mod: PBBFAC,,, | Performed by: OPTOMETRIST

## 2024-10-07 PROCEDURE — 1159F MED LIST DOCD IN RCRD: CPT | Mod: CPTII,S$GLB,, | Performed by: OPTOMETRIST

## 2024-10-07 PROCEDURE — 99214 OFFICE O/P EST MOD 30 MIN: CPT | Mod: S$GLB,,, | Performed by: OPTOMETRIST

## 2024-10-07 RX ORDER — PREDNISOLONE ACETATE 10 MG/ML
1 SUSPENSION/ DROPS OPHTHALMIC 4 TIMES DAILY
Qty: 5 ML | Refills: 0 | Status: SHIPPED | OUTPATIENT
Start: 2024-10-07

## 2024-10-07 NOTE — PROGRESS NOTES
HPI    Tammie Gomez is a 29 y.o. female who comes in for continued diabetic eye   care. She was diagnosed with Type 1 DM around age 5. It is controlled with   an insulin pump and CGM. She was last seen on 09/28/2022. Today, she   reports that she has not noticed any new or concerning ocular or visual   symptoms. Her current BS is 220.    Hemoglobin A1C (%)       Date                     Value                 03/31/2023               7.2 (H)               01/03/2023               7.3 (H)               09/27/2022               7.6 (H)          ----------      (--)blurred vision  (--)Headaches  (--)diplopia  (--)flashes  (--)floaters  (--)pain  (--)Itching  (--)tearing  (--)burning  (--)Dryness  (--) OTC Drops  (--)Photophobia      Last edited by Elmira Bailon MA on 10/7/2024  1:34 PM.        For exam results, see encounter report    Assessment /Plan    1. Anterior uveitis --> rule out systemic etiology  - Start prednisolone acetate 1% drops: 1 drop in both eyes, 4 times daily  - Sedimentation rate; Future  - Angiotensin Converting Enzyme; Future  - KIRSTY Screen w/Reflex; Future  - Rheumatoid Factor; Future  - C-Reactive Protein; Future  - CBC Auto Differential; Future  - Comprehensive Metabolic Panel; Future  - T3; Standing  - T4, free; Standing    2. Type 1 diabetes mellitus without retinopathy  - GLUCOSE, FASTING; Future  - HEMOGLOBIN A1C; Future  - LIPID PANEL; Future  - POCT TB Skin Test Read  - T3; Standing  - T4, free; Standing    3. Mild, Latent, Bilateral Hyperopia --. Symptomatic  - Computer glasses  Glasses Prescription (10/7/2024)          Sphere Cylinder    Right +0.75 Sphere    Left +0.50 Sphere      Type: SVL    Expiration Date: 10/7/2025            Patient education; RTC in 1 week for Uveitis and IOP check, sooner as needed     Visit today is associated with current or anticipated ongoing medical care related to this patients single serious condition/complex condition   Anterior  uveitis

## 2024-10-09 ENCOUNTER — LAB VISIT (OUTPATIENT)
Dept: LAB | Facility: OTHER | Age: 29
End: 2024-10-09
Attending: OPTOMETRIST
Payer: COMMERCIAL

## 2024-10-09 DIAGNOSIS — E11.9 TYPE 2 DIABETES MELLITUS WITHOUT COMPLICATION: ICD-10-CM

## 2024-10-09 DIAGNOSIS — E10.9 TYPE 1 DIABETES MELLITUS WITHOUT RETINOPATHY: ICD-10-CM

## 2024-10-09 LAB
ALBUMIN/CREAT UR: 7.9 UG/MG (ref 0–30)
CHOLEST SERPL-MCNC: 166 MG/DL (ref 120–199)
CHOLEST/HDLC SERPL: 2.5 {RATIO} (ref 2–5)
CREAT UR-MCNC: 201.5 MG/DL (ref 15–325)
ESTIMATED AVG GLUCOSE: 134 MG/DL (ref 68–131)
HBA1C MFR BLD: 6.3 % (ref 4–5.6)
HDLC SERPL-MCNC: 67 MG/DL (ref 40–75)
HDLC SERPL: 40.4 % (ref 20–50)
LDLC SERPL CALC-MCNC: 90.6 MG/DL (ref 63–159)
MICROALBUMIN UR DL<=1MG/L-MCNC: 16 UG/ML
NONHDLC SERPL-MCNC: 99 MG/DL
TRIGL SERPL-MCNC: 42 MG/DL (ref 30–150)

## 2024-10-09 PROCEDURE — 83036 HEMOGLOBIN GLYCOSYLATED A1C: CPT | Performed by: OPTOMETRIST

## 2024-10-09 PROCEDURE — 80061 LIPID PANEL: CPT | Performed by: OPTOMETRIST

## 2024-10-09 PROCEDURE — 82570 ASSAY OF URINE CREATININE: CPT | Performed by: INTERNAL MEDICINE

## 2024-10-09 PROCEDURE — 36415 COLL VENOUS BLD VENIPUNCTURE: CPT | Performed by: OPTOMETRIST

## 2024-10-09 PROCEDURE — 82043 UR ALBUMIN QUANTITATIVE: CPT | Performed by: INTERNAL MEDICINE

## 2024-10-15 ENCOUNTER — OFFICE VISIT (OUTPATIENT)
Dept: OPTOMETRY | Facility: CLINIC | Age: 29
End: 2024-10-15
Payer: COMMERCIAL

## 2024-10-15 DIAGNOSIS — H20.9 ANTERIOR UVEITIS: Primary | ICD-10-CM

## 2024-10-15 PROCEDURE — 3044F HG A1C LEVEL LT 7.0%: CPT | Mod: CPTII,S$GLB,, | Performed by: OPTOMETRIST

## 2024-10-15 PROCEDURE — 99999 PR PBB SHADOW E&M-EST. PATIENT-LVL III: CPT | Mod: PBBFAC,,, | Performed by: OPTOMETRIST

## 2024-10-15 PROCEDURE — 3066F NEPHROPATHY DOC TX: CPT | Mod: CPTII,S$GLB,, | Performed by: OPTOMETRIST

## 2024-10-15 PROCEDURE — 99214 OFFICE O/P EST MOD 30 MIN: CPT | Mod: S$GLB,,, | Performed by: OPTOMETRIST

## 2024-10-15 PROCEDURE — 1159F MED LIST DOCD IN RCRD: CPT | Mod: CPTII,S$GLB,, | Performed by: OPTOMETRIST

## 2024-10-15 PROCEDURE — 3061F NEG MICROALBUMINURIA REV: CPT | Mod: CPTII,S$GLB,, | Performed by: OPTOMETRIST

## 2024-10-15 RX ORDER — PREDNISOLONE ACETATE 10 MG/ML
SUSPENSION/ DROPS OPHTHALMIC
Qty: 5 ML | Refills: 0 | Status: SHIPPED | OUTPATIENT
Start: 2024-10-15 | End: 2024-10-21

## 2024-10-15 NOTE — PROGRESS NOTES
HPI    Tammie Gomez is a 29 y.o. female who returns  for continued eye care.    Tammie has type 1 DM. During her last exam with me, on 10/7/24, she was   noted to have bilateral iritis. prednisolone acetate 1% drops 4 times   daily.  Today, she reports that she is using the drops 3-4 times daily.   She has not noticed any specific concerning ocular or visual symptoms.    (--)blurred vision  (--)Headaches  (--)diplopia  (--)flashes  (--)floaters  (--)pain  (--)Itching  (--)tearing  (--)burning  (--)Dryness  (--) OTC Drops  (--)Photophobia       Last edited by Peter Ocasio, OD on 10/15/2024  4:11 PM.        For exam results, see encounter report    Assessment /Plan    1. Anterior uveitis, both eyes --> resolved  - Will taper prednisolone acetate 1% drops: 1 drop in both eyes 3 x daily for 2 days, then twice daily for 2 days, then once(1) daily for 2 days, then STOP          Patient education; RTC in 1 year with DFE; Ok to instill 0.5% Tropicamide after (normal) baseline workup, sooner as needed at [facility]

## 2024-10-16 ENCOUNTER — OFFICE VISIT (OUTPATIENT)
Dept: ENDOCRINOLOGY | Facility: CLINIC | Age: 29
End: 2024-10-16
Payer: COMMERCIAL

## 2024-10-16 VITALS
HEART RATE: 86 BPM | HEIGHT: 62 IN | SYSTOLIC BLOOD PRESSURE: 118 MMHG | DIASTOLIC BLOOD PRESSURE: 80 MMHG | OXYGEN SATURATION: 99 % | BODY MASS INDEX: 27.12 KG/M2 | WEIGHT: 147.38 LBS

## 2024-10-16 DIAGNOSIS — Z96.41 INSULIN PUMP STATUS: ICD-10-CM

## 2024-10-16 DIAGNOSIS — E10.65 TYPE 1 DIABETES MELLITUS WITH HYPERGLYCEMIA: Primary | ICD-10-CM

## 2024-10-16 DIAGNOSIS — E10.649 TYPE 1 DIABETES MELLITUS WITH HYPOGLYCEMIA AND WITHOUT COMA: ICD-10-CM

## 2024-10-16 PROCEDURE — 99999 PR PBB SHADOW E&M-EST. PATIENT-LVL IV: CPT | Mod: PBBFAC,,, | Performed by: INTERNAL MEDICINE

## 2024-10-16 RX ORDER — BLOOD-GLUCOSE TRANSMITTER
1 EACH MISCELLANEOUS
Qty: 1 EACH | Refills: 3 | Status: SHIPPED | OUTPATIENT
Start: 2024-10-16

## 2024-10-16 RX ORDER — CALCIUM CARB/VITAMIN D3/VIT K1 500-100-40
TABLET,CHEWABLE ORAL
Qty: 100 EACH | Refills: 1 | Status: SHIPPED | OUTPATIENT
Start: 2024-10-16

## 2024-10-16 RX ORDER — GLUCAGON INJECTION, SOLUTION 1 MG/.2ML
1 INJECTION, SOLUTION SUBCUTANEOUS
Qty: 2 EACH | Refills: 0 | Status: SHIPPED | OUTPATIENT
Start: 2024-10-16

## 2024-10-16 RX ORDER — BLOOD-GLUCOSE SENSOR
3 EACH MISCELLANEOUS
Qty: 3 EACH | Refills: 11 | Status: SHIPPED | OUTPATIENT
Start: 2024-10-16 | End: 2025-10-16

## 2024-10-16 RX ORDER — INSULIN LISPRO 100 [IU]/ML
INJECTION, SOLUTION INTRAVENOUS; SUBCUTANEOUS
Qty: 90 ML | Refills: 3 | Status: SHIPPED | OUTPATIENT
Start: 2024-10-16 | End: 2025-10-14

## 2024-10-16 NOTE — ASSESSMENT & PLAN NOTE
Pump and sensor download reviewed and GMI 7.8% despite recent lower A1c  Some persistent highs in afternoon. Suspect she is developing scar at frequently used pump sites. Discussed rotation and trying thighs, abdomen  Will also make pump changes as below    Refilled glucagon  Has lantus and back-up plan    Discussed the following:  Patient Instructions   Try using your abdomen for pump sites    Change target 110  CR 1:7

## 2024-10-16 NOTE — PROGRESS NOTES
"Pump Clinic Return Visit  10/16/2024    Tammie Gomez is a 29 y.o.female presenting for follow-up of type 1 DM.      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 Finds sugars remains persistently high while she is working, some leaking of insulin around pods  Also some skin irritation  Usually puts pods on arms or flanks, similar areas each time  Has never used abdomen or legs      Current diabetes regimen:  Pump: Omnipod 5 with Humalog insulin  Pump Settings  Basal Rate  12A:    0.9 u/hr  4A:      0.65 u/hr  8A:      0.55 u/hr  12P:    0.8 u/hr     Carb Ratio  12A:    1:8    ISF  12A:    1:65  12P:    1:60    Target: 120-120    IAT: 4 hrs          Lab Results   Component Value Date    HGBA1C 6.3 (H) 10/09/2024       Glucagon: yes Gvoke  Back-up basal insulin: yes, Lantus    Glucose Monitoring:  Meter/CGM: Dexcom G6, both CGM and Pump Report downloaded and reviewed.       Hypoglycemic Episodes: sometimes at work    DKA: none, recently    Has started nursing school    Pregnancy plans: none, OCPs    Screening / DM Complications:  Neuropathy: none  Last foot exam : 09/29/2022  Last eye exam : 10/07/2024;  no laser surgery or DR  CVD/MI: None:   Nephropathy: none  No results found for: "MICROALBUR", "[id number]"      Lab Results   Component Value Date    TSH 2.307 09/09/2021     Lab Results   Component Value Date    TTGIGA 5 12/09/2021         Lipids: no therapies  Lab Results   Component Value Date    CHOL 166 10/09/2024    TRIG 42 10/09/2024    HDL 67 10/09/2024    LDLCALC 90.6 10/09/2024    CHOLHDL 40.4 10/09/2024         Current Outpatient Medications:     BD ULTRA-FINE ABRIL PEN NEEDLE 32 gauge x 5/32" Ndle, To use with insulin injections 4 times daily, Disp: 100 each, Rfl: 3    blood sugar diagnostic (FREESTYLE TEST) Strp, USE AS DIRECTED TO TEST BLOOD GLUCOSE LEVELS UP TO 8 TIMES " "DAILY, Disp: 250 strip, Rfl: 3    fluconazole (DIFLUCAN) 150 MG Tab, Take 1 tablet (150 mg total) by mouth every 72 hours as needed (if itching persist after first dose)., Disp: 2 tablet, Rfl: 0    LANTUS SOLOSTAR U-100 INSULIN glargine 100 units/mL SubQ pen, Take 26 units once a day in instance of pump failure, Disp: 15 mL, Rfl: 3    LUTERA, 28, 0.1-20 mg-mcg per tablet, Take 1 PO daily, Disp: 84 tablet, Rfl: 0    measles, mumps and rubella vaccine (MMR) 1,000-12,500 TCID50/0.5 mL injection, Inject into the skin., Disp: 0.5 mL, Rfl: 0    prednisoLONE acetate (PRED FORTE) 1 % DrpS, Place 1 drop into both eyes 3 (three) times daily for 2 days, THEN 1 drop 2 (two) times daily for 2 days, THEN 1 drop once daily for 2 days., Disp: 5 mL, Rfl: 0    urine glucose-ketones test (KETO-DIASTIX) Strp, Check urine ketones when BG>300, Disp: 100 strip, Rfl: 3    blood-glucose sensor (DEXCOM G6 SENSOR) Francie, Use to check glucose continuously. Change sensor every 10 days., Disp: 3 each, Rfl: 11    blood-glucose transmitter (DEXCOM G6 TRANSMITTER) Francie, 1 each by Misc.(Non-Drug; Combo Route) route every 3 (three) months., Disp: 1 each, Rfl: 3    glucagon (GVOKE HYPOPEN 2-PACK) 1 mg/0.2 mL AtIn, Inject 1 Package into the skin as needed. Glucagon is an emergency pen that is used to increase your blood sugar. Glucagon is a pen that is used in an emergency situation where you are unable to eat or drink anything. Glucagon is a medication that is given by someone else-spouse, child, etc., Disp: 2 each, Rfl: 0    insulin lispro (HUMALOG U-100 INSULIN) 100 unit/mL injection, Max 200 units in pump every 2 days-90day supply, Disp: 90 mL, Rfl: 3    insulin syringe-needle U-100 0.3 mL 31 gauge x 5/16" Syrg, To use in case of pump failure up to 4 times daily, Disp: 100 each, Rfl: 1    ROS as above    Objective:     Vitals:    10/16/24 1043   BP: 118/80   Pulse: 86         Wt Readings from Last 3 Encounters:   10/16/24 1043 66.8 kg (147 lb 6 oz) "   06/20/23 1017 61.1 kg (134 lb 11.2 oz)   05/18/23 1302 57.9 kg (127 lb 10.3 oz)         Body mass index is 26.96 kg/m².    Physical Exam  Constitutional:       Appearance: She is well-developed.   HENT:      Head: Normocephalic.   Eyes:      Conjunctiva/sclera: Conjunctivae normal.   Pulmonary:      Effort: Pulmonary effort is normal.   Musculoskeletal:         General: Normal range of motion.   Skin:     General: Skin is warm.      Findings: No rash.   Neurological:      Mental Status: She is alert and oriented to person, place, and time.     Skin irritation at previous pump site L arm  Pump R flank. Some lipohypertrophy arms and flanks at common pump sites      Protective Sensation (w/ 10 gram monofilament):  Right: Intact  Left: Intact    Visual Inspection:normal    Pedal Pulses:   Right: Present  Left: Present    Posterior Tibialis Pulses:   Right:Present  Left: Present      LABS    Chemistry        Component Value Date/Time     12/09/2021 0954    K 4.6 12/09/2021 0954     12/09/2021 0954    CO2 22 (L) 12/09/2021 0954    BUN 11 12/09/2021 0954    CREATININE 0.8 12/09/2021 0954     (H) 12/09/2021 0954        Component Value Date/Time    CALCIUM 10.0 12/09/2021 0954    ALKPHOS 47 (L) 12/09/2021 0954    AST 18 12/09/2021 0954    ALT 16 12/09/2021 0954    BILITOT 0.6 12/09/2021 0954    ESTGFRAFRICA >60.0 12/09/2021 0954    EGFRNONAA >60.0 12/09/2021 0954              Assessment and Plan     Problem List Items Addressed This Visit          1 - High    Type 1 diabetes mellitus with hyperglycemia - Primary     Pump and sensor download reviewed and GMI 7.8% despite recent lower A1c  Some persistent highs in afternoon. Suspect she is developing scar at frequently used pump sites. Discussed rotation and trying thighs, abdomen  Will also make pump changes as below    Refilled glucagon  Has lantus and back-up plan    Discussed the following:  Patient Instructions   Try using your abdomen for pump  sites    Change target 110  CR 1:7           Relevant Medications    glucagon (GVOKE HYPOPEN 2-PACK) 1 mg/0.2 mL AtIn    insulin lispro (HUMALOG U-100 INSULIN) 100 unit/mL injection    blood-glucose sensor (DEXCOM G6 SENSOR) Francie    blood-glucose transmitter (DEXCOM G6 TRANSMITTER) Francie       3     Insulin pump status     As above          Other Visit Diagnoses       Type 1 diabetes mellitus with hypoglycemia and without coma        Relevant Medications    glucagon (GVOKE HYPOPEN 2-PACK) 1 mg/0.2 mL AtIn    insulin lispro (HUMALOG U-100 INSULIN) 100 unit/mL injection    blood-glucose sensor (DEXCOM G6 SENSOR) Francie    blood-glucose transmitter (DEXCOM G6 TRANSMITTER) Francie                   RTC in 3 months          Pump backup plan:    If the insulin pump is non functional and discontinued for anticipated more than 20 hours, please give daily injections of:  Long acting insulin: 26 units of Lantus daily  Short acting insulin:  Humalog with meals according to carb ratio 1 unit per 8 grams and sensitivity factor 1 unit for every 60 above 120      For any technical insulin pump issues, please contact the insulin pump company; the toll free number is printed on the label on the back of the insulin pump.

## 2024-11-04 ENCOUNTER — OFFICE VISIT (OUTPATIENT)
Dept: OBSTETRICS AND GYNECOLOGY | Facility: CLINIC | Age: 29
End: 2024-11-04
Payer: COMMERCIAL

## 2024-11-04 VITALS
BODY MASS INDEX: 27.34 KG/M2 | DIASTOLIC BLOOD PRESSURE: 74 MMHG | SYSTOLIC BLOOD PRESSURE: 102 MMHG | WEIGHT: 148.56 LBS | HEIGHT: 62 IN

## 2024-11-04 DIAGNOSIS — Z01.419 WELL WOMAN EXAM WITH ROUTINE GYNECOLOGICAL EXAM: Primary | ICD-10-CM

## 2024-11-04 PROCEDURE — 99999 PR PBB SHADOW E&M-EST. PATIENT-LVL III: CPT | Mod: PBBFAC,,, | Performed by: STUDENT IN AN ORGANIZED HEALTH CARE EDUCATION/TRAINING PROGRAM

## 2024-11-04 PROCEDURE — 3066F NEPHROPATHY DOC TX: CPT | Mod: CPTII,S$GLB,, | Performed by: STUDENT IN AN ORGANIZED HEALTH CARE EDUCATION/TRAINING PROGRAM

## 2024-11-04 PROCEDURE — 3061F NEG MICROALBUMINURIA REV: CPT | Mod: CPTII,S$GLB,, | Performed by: STUDENT IN AN ORGANIZED HEALTH CARE EDUCATION/TRAINING PROGRAM

## 2024-11-04 PROCEDURE — 3008F BODY MASS INDEX DOCD: CPT | Mod: CPTII,S$GLB,, | Performed by: STUDENT IN AN ORGANIZED HEALTH CARE EDUCATION/TRAINING PROGRAM

## 2024-11-04 PROCEDURE — 87491 CHLMYD TRACH DNA AMP PROBE: CPT | Performed by: STUDENT IN AN ORGANIZED HEALTH CARE EDUCATION/TRAINING PROGRAM

## 2024-11-04 PROCEDURE — 87624 HPV HI-RISK TYP POOLED RSLT: CPT | Performed by: STUDENT IN AN ORGANIZED HEALTH CARE EDUCATION/TRAINING PROGRAM

## 2024-11-04 PROCEDURE — 3078F DIAST BP <80 MM HG: CPT | Mod: CPTII,S$GLB,, | Performed by: STUDENT IN AN ORGANIZED HEALTH CARE EDUCATION/TRAINING PROGRAM

## 2024-11-04 PROCEDURE — 99395 PREV VISIT EST AGE 18-39: CPT | Mod: S$GLB,,, | Performed by: STUDENT IN AN ORGANIZED HEALTH CARE EDUCATION/TRAINING PROGRAM

## 2024-11-04 PROCEDURE — 88175 CYTOPATH C/V AUTO FLUID REDO: CPT | Performed by: STUDENT IN AN ORGANIZED HEALTH CARE EDUCATION/TRAINING PROGRAM

## 2024-11-04 PROCEDURE — 3074F SYST BP LT 130 MM HG: CPT | Mod: CPTII,S$GLB,, | Performed by: STUDENT IN AN ORGANIZED HEALTH CARE EDUCATION/TRAINING PROGRAM

## 2024-11-04 PROCEDURE — 3044F HG A1C LEVEL LT 7.0%: CPT | Mod: CPTII,S$GLB,, | Performed by: STUDENT IN AN ORGANIZED HEALTH CARE EDUCATION/TRAINING PROGRAM

## 2024-11-04 RX ORDER — LEVONORGESTREL AND ETHINYL ESTRADIOL 0.1-0.02MG
KIT ORAL
Qty: 84 TABLET | Refills: 4 | Status: SHIPPED | OUTPATIENT
Start: 2024-11-04

## 2024-11-04 NOTE — PROGRESS NOTES
History & Physical  Gynecology      SUBJECTIVE:     Chief Complaint: Well Woman (Pt states has vaginal odor )       History of Present Illness:  Annual exam. Reports vaginal odor often but when period is about to start it gets worse. Fishy odor. Sometimes discharge at same time. Showers. And uses soap  Menstrual History: menarche age 14, reports periods were irregular prior to starting OCP.   Obstetric Hx: g0  LMP: 10/14  STD/STI Hx: Denies any history of STD's  Contraception Hx: ocp. Consistent condom use.  Sexually Active: one male partner  Family history: below  Social: Wears seatbelts. Exercises. Feels safe at home.   Last pap: 2021 normal  HPV vaccine: yes  Vitamins: not taking      Lab Results   Component Value Date    HGBA1C 6.3 (H) 10/09/2024         Review of patient's allergies indicates:   Allergen Reactions    Sulfa (sulfonamide antibiotics)        Past Medical History:   Diagnosis Date    Diabetes mellitus     Diabetes mellitus type I     Uveitis 10/14/2024    bilateral     Past Surgical History:   Procedure Laterality Date    WISDOM TOOTH EXTRACTION       OB History    No obstetric history on file.       Family History   Problem Relation Name Age of Onset    No Known Problems Paternal Grandfather      No Known Problems Paternal Grandmother      No Known Problems Maternal Grandmother      No Known Problems Maternal Grandfather      No Known Problems Father Gl     Diabetes Mother          T1DM    No Known Problems Brother      No Known Problems Sister      No Known Problems Maternal Aunt      No Known Problems Maternal Uncle      No Known Problems Paternal Aunt      No Known Problems Paternal Uncle      Cataracts Neg Hx      Glaucoma Neg Hx      Retinal detachment Neg Hx      Amblyopia Neg Hx      Blindness Neg Hx      Strabismus Neg Hx      Breast cancer Neg Hx      Colon cancer Neg Hx      Ovarian cancer Neg Hx       Social History     Tobacco Use    Smoking status: Never     Passive exposure: Never  "   Smokeless tobacco: Never   Substance Use Topics    Alcohol use: Yes     Comment: socially    Drug use: Never       Current Outpatient Medications   Medication Sig    BD ULTRA-FINE ABRIL PEN NEEDLE 32 gauge x 5/32" Ndle To use with insulin injections 4 times daily    blood sugar diagnostic (FREESTYLE TEST) Strp USE AS DIRECTED TO TEST BLOOD GLUCOSE LEVELS UP TO 8 TIMES DAILY    blood-glucose sensor (DEXCOM G6 SENSOR) Francie Use to check glucose continuously. Change sensor every 10 days.    blood-glucose transmitter (DEXCOM G6 TRANSMITTER) Francie 1 each by Misc.(Non-Drug; Combo Route) route every 3 (three) months.    fluconazole (DIFLUCAN) 150 MG Tab Take 1 tablet (150 mg total) by mouth every 72 hours as needed (if itching persist after first dose).    glucagon (GVOKE HYPOPEN 2-PACK) 1 mg/0.2 mL AtIn Inject 1 Package into the skin as needed. Glucagon is an emergency pen that is used to increase your blood sugar. Glucagon is a pen that is used in an emergency situation where you are unable to eat or drink anything. Glucagon is a medication that is given by someone else-spouse, child, etc.    insulin lispro (HUMALOG U-100 INSULIN) 100 unit/mL injection Max 200 units in pump every 2 days-90day supply    insulin syringe-needle U-100 0.3 mL 31 gauge x 5/16" Syrg To use in case of pump failure up to 4 times daily    LANTUS SOLOSTAR U-100 INSULIN glargine 100 units/mL SubQ pen Take 26 units once a day in instance of pump failure    measles, mumps and rubella vaccine (MMR) 1,000-12,500 TCID50/0.5 mL injection Inject into the skin.    urine glucose-ketones test (KETO-DIASTIX) Strp Check urine ketones when BG>300    LUTERA, 28, 0.1-20 mg-mcg per tablet Take 1 PO daily     No current facility-administered medications for this visit.         Review of Systems:  Review of Systems   Constitutional:  Negative for activity change, appetite change, fever and unexpected weight change.   Respiratory:  Negative for shortness of breath.  "   Cardiovascular:  Negative for chest pain.   Gastrointestinal:  Negative for abdominal pain, blood in stool, constipation, diarrhea, nausea and vomiting.   Genitourinary:  Positive for vaginal odor. Negative for dysmenorrhea, dyspareunia, dysuria, hematuria, menorrhagia, pelvic pain, vaginal bleeding, vaginal discharge, vaginal pain and postcoital bleeding.   Integumentary:  Negative for breast mass.   Neurological:  Negative for headaches.   Breast: Negative for lump and mass       OBJECTIVE:     Physical Exam:  Physical Exam  Vitals reviewed.   Constitutional:       General: She is not in acute distress.     Appearance: She is well-developed. She is not diaphoretic.   HENT:      Head: Normocephalic and atraumatic.   Eyes:      General: No scleral icterus.        Right eye: No discharge.         Left eye: No discharge.      Conjunctiva/sclera: Conjunctivae normal.   Neck:      Thyroid: No thyromegaly.   Cardiovascular:      Rate and Rhythm: Normal rate.   Pulmonary:      Effort: Pulmonary effort is normal.   Chest:   Breasts:     Breasts are symmetrical.      Right: No inverted nipple, mass, nipple discharge, skin change or tenderness.      Left: No inverted nipple, mass, nipple discharge, skin change or tenderness.   Abdominal:      General: There is no distension.      Palpations: Abdomen is soft.      Tenderness: There is no abdominal tenderness.   Genitourinary:     Labia:         Right: No rash, tenderness, lesion or injury.         Left: No rash, tenderness, lesion or injury.       Vagina: Normal. No signs of injury and foreign body. No vaginal discharge, erythema, tenderness or bleeding.      Cervix: No cervical motion tenderness, discharge or friability.      Uterus: Not deviated, not enlarged, not fixed and not tender.       Adnexa:         Right: No mass, tenderness or fullness.          Left: No mass, tenderness or fullness.     Musculoskeletal:         General: Normal range of motion.      Cervical  back: Normal range of motion and neck supple.   Lymphadenopathy:      Cervical: No cervical adenopathy.   Skin:     General: Skin is warm and dry.      Findings: No erythema or rash.   Neurological:      Mental Status: She is alert and oriented to person, place, and time.           ASSESSMENT:       ICD-10-CM ICD-9-CM    1. Well woman exam with routine gynecological exam  Z01.419 V72.31 HPV High Risk Genotypes, PCR      Liquid-Based Pap Smear, Screening      C. trachomatis/N. gonorrhoeae by AMP DNA             Plan:      WWE  - Vaccines utd  - Pap and co test collected  - Mammogram age 42  - GC/CTcollected  - Daily vitamin discussed.  - OCP refilled. Recommended decrease soap use to avoid overgrowth of BV  - CBE normal. Physical exam normal. VSS.  - RTC for annual or PRN.        Counseling time: 15 minutes    Delfina Ramsey

## 2024-11-05 LAB
C TRACH DNA SPEC QL NAA+PROBE: NOT DETECTED
N GONORRHOEA DNA SPEC QL NAA+PROBE: NOT DETECTED

## 2024-11-07 LAB
HPV HR 12 DNA SPEC QL NAA+PROBE: POSITIVE
HPV16 AG SPEC QL: NEGATIVE
HPV18 DNA SPEC QL NAA+PROBE: NEGATIVE

## 2024-11-08 LAB
FINAL PATHOLOGIC DIAGNOSIS: NORMAL
Lab: NORMAL

## 2024-11-08 RX ORDER — METRONIDAZOLE 500 MG/1
500 TABLET ORAL EVERY 12 HOURS
Qty: 14 TABLET | Refills: 0 | Status: SHIPPED | OUTPATIENT
Start: 2024-11-08 | End: 2024-11-15

## 2024-11-26 RX ORDER — FLUCONAZOLE 150 MG/1
150 TABLET ORAL
Qty: 2 TABLET | Refills: 0 | Status: SHIPPED | OUTPATIENT
Start: 2024-11-26

## 2024-12-05 ENCOUNTER — PATIENT MESSAGE (OUTPATIENT)
Dept: ENDOCRINOLOGY | Facility: CLINIC | Age: 29
End: 2024-12-05
Payer: COMMERCIAL

## 2024-12-05 DIAGNOSIS — E10.649 TYPE 1 DIABETES MELLITUS WITH HYPOGLYCEMIA AND WITHOUT COMA: ICD-10-CM

## 2024-12-05 RX ORDER — BLOOD-GLUCOSE TRANSMITTER
1 EACH MISCELLANEOUS
Qty: 1 EACH | Refills: 3 | Status: SHIPPED | OUTPATIENT
Start: 2024-12-05

## 2024-12-05 RX ORDER — BLOOD-GLUCOSE SENSOR
3 EACH MISCELLANEOUS
Qty: 3 EACH | Refills: 11 | Status: SHIPPED | OUTPATIENT
Start: 2024-12-05 | End: 2025-12-05

## 2024-12-05 RX ORDER — INSULIN PMP CART,AUT,G6/7,CNTR
1 EACH SUBCUTANEOUS
Qty: 10 EACH | Refills: 11 | Status: SHIPPED | OUTPATIENT
Start: 2024-12-05

## 2024-12-27 ENCOUNTER — PATIENT MESSAGE (OUTPATIENT)
Dept: ENDOCRINOLOGY | Facility: CLINIC | Age: 29
End: 2024-12-27
Payer: COMMERCIAL

## 2024-12-27 DIAGNOSIS — E10.649 TYPE 1 DIABETES MELLITUS WITH HYPOGLYCEMIA AND WITHOUT COMA: ICD-10-CM

## 2024-12-27 RX ORDER — INSULIN LISPRO 100 [IU]/ML
INJECTION, SOLUTION INTRAVENOUS; SUBCUTANEOUS
Qty: 90 ML | Refills: 3 | Status: SHIPPED | OUTPATIENT
Start: 2024-12-27 | End: 2025-12-25

## 2025-01-31 ENCOUNTER — PATIENT MESSAGE (OUTPATIENT)
Dept: ENDOCRINOLOGY | Facility: CLINIC | Age: 30
End: 2025-01-31
Payer: COMMERCIAL

## 2025-01-31 DIAGNOSIS — E10.649 TYPE 1 DIABETES MELLITUS WITH HYPOGLYCEMIA AND WITHOUT COMA: ICD-10-CM

## 2025-02-03 RX ORDER — BLOOD-GLUCOSE TRANSMITTER
1 EACH MISCELLANEOUS
Qty: 1 EACH | Refills: 3 | Status: SHIPPED | OUTPATIENT
Start: 2025-02-03 | End: 2025-02-27 | Stop reason: SDUPTHER

## 2025-02-03 RX ORDER — BLOOD-GLUCOSE SENSOR
3 EACH MISCELLANEOUS
Qty: 3 EACH | Refills: 11 | Status: SHIPPED | OUTPATIENT
Start: 2025-02-03 | End: 2025-02-27 | Stop reason: SDUPTHER

## 2025-02-03 RX ORDER — INSULIN PMP CART,AUT,G6/7,CNTR
1 EACH SUBCUTANEOUS
Qty: 10 EACH | Refills: 11 | Status: SHIPPED | OUTPATIENT
Start: 2025-02-03 | End: 2025-02-27 | Stop reason: SDUPTHER

## 2025-02-27 ENCOUNTER — PATIENT MESSAGE (OUTPATIENT)
Dept: ENDOCRINOLOGY | Facility: CLINIC | Age: 30
End: 2025-02-27
Payer: COMMERCIAL

## 2025-02-27 DIAGNOSIS — E10.649 TYPE 1 DIABETES MELLITUS WITH HYPOGLYCEMIA AND WITHOUT COMA: ICD-10-CM

## 2025-02-27 RX ORDER — INSULIN PMP CART,AUT,G6/7,CNTR
1 EACH SUBCUTANEOUS
Qty: 10 EACH | Refills: 11 | Status: SHIPPED | OUTPATIENT
Start: 2025-02-27

## 2025-02-27 RX ORDER — BLOOD-GLUCOSE SENSOR
1 EACH MISCELLANEOUS
Qty: 3 EACH | Refills: 11 | Status: SHIPPED | OUTPATIENT
Start: 2025-02-27 | End: 2026-02-27

## 2025-02-27 RX ORDER — BLOOD-GLUCOSE TRANSMITTER
1 EACH MISCELLANEOUS
Qty: 1 EACH | Refills: 3 | Status: SHIPPED | OUTPATIENT
Start: 2025-02-27

## 2025-04-08 ENCOUNTER — PATIENT MESSAGE (OUTPATIENT)
Dept: ENDOCRINOLOGY | Facility: CLINIC | Age: 30
End: 2025-04-08
Payer: COMMERCIAL

## 2025-07-01 ENCOUNTER — PATIENT MESSAGE (OUTPATIENT)
Dept: OBSTETRICS AND GYNECOLOGY | Facility: CLINIC | Age: 30
End: 2025-07-01
Payer: COMMERCIAL

## 2025-07-03 ENCOUNTER — TELEPHONE (OUTPATIENT)
Dept: OBSTETRICS AND GYNECOLOGY | Facility: CLINIC | Age: 30
End: 2025-07-03
Payer: COMMERCIAL

## 2025-07-28 ENCOUNTER — PATIENT MESSAGE (OUTPATIENT)
Dept: ENDOCRINOLOGY | Facility: CLINIC | Age: 30
End: 2025-07-28
Payer: COMMERCIAL

## 2025-07-28 RX ORDER — LEVONORGESTREL AND ETHINYL ESTRADIOL 0.1-0.02MG
1 KIT ORAL DAILY
Qty: 84 TABLET | Refills: 1 | Status: SHIPPED | OUTPATIENT
Start: 2025-07-28

## 2025-07-28 NOTE — TELEPHONE ENCOUNTER
Refill Routing Note   Medication(s) are not appropriate for processing by [facility] for the following reason(s):        Drug-disease interaction    [facility] action(s):  Defer             Pharmacist review requested: Yes     Appointments  past 12m or future 3m with PCP    Date Provider   Last Visit   11/4/2024 Delfina Ramsey MD   Next Visit   Visit date not found Delfina Ramsey MD   ED visits in past 90 days: 0        Note composed:7:03 AM 07/28/2025

## 2025-07-28 NOTE — TELEPHONE ENCOUNTER
Tammie Gomez  is requesting a refill authorization.  Brief Assessment and Rationale for Refill:  Approve     Medication Therapy Plan:         Pharmacist review requested: Yes   Extended chart review required: Yes   Comments:     Note composed:11:00 AM 07/28/2025